# Patient Record
Sex: FEMALE | Race: WHITE | NOT HISPANIC OR LATINO | Employment: FULL TIME | URBAN - METROPOLITAN AREA
[De-identification: names, ages, dates, MRNs, and addresses within clinical notes are randomized per-mention and may not be internally consistent; named-entity substitution may affect disease eponyms.]

---

## 2019-06-10 ENCOUNTER — TRANSCRIBE ORDERS (OUTPATIENT)
Dept: ADMINISTRATIVE | Facility: HOSPITAL | Age: 39
End: 2019-06-10

## 2019-06-10 ENCOUNTER — HOSPITAL ENCOUNTER (OUTPATIENT)
Dept: GENERAL RADIOLOGY | Facility: HOSPITAL | Age: 39
Discharge: HOME | End: 2019-06-10
Admitting: INTERNAL MEDICINE

## 2019-06-10 DIAGNOSIS — M54.2 NECK PAIN: Primary | ICD-10-CM

## 2019-06-10 DIAGNOSIS — M54.2 NECK PAIN: ICD-10-CM

## 2019-06-10 PROCEDURE — 72040 X-RAY EXAM NECK SPINE 2-3 VW: CPT

## 2019-06-28 ENCOUNTER — TRANSCRIBE ORDERS (OUTPATIENT)
Dept: ADMINISTRATIVE | Facility: HOSPITAL | Age: 39
End: 2019-06-28

## 2019-06-28 DIAGNOSIS — R11.0 NAUSEA: ICD-10-CM

## 2019-06-28 DIAGNOSIS — R51.9 HEADACHE, CHRONIC DAILY: Primary | ICD-10-CM

## 2019-07-15 ENCOUNTER — HOSPITAL ENCOUNTER (OUTPATIENT)
Dept: MRI IMAGING | Facility: HOSPITAL | Age: 39
Discharge: HOME | End: 2019-07-15
Admitting: INTERNAL MEDICINE

## 2019-07-15 DIAGNOSIS — R51.9 HEADACHE, CHRONIC DAILY: ICD-10-CM

## 2019-07-15 DIAGNOSIS — R11.0 NAUSEA: ICD-10-CM

## 2019-07-15 PROCEDURE — 70551 MRI BRAIN STEM W/O DYE: CPT

## 2021-02-24 RX ORDER — DULOXETIN HYDROCHLORIDE 30 MG/1
30 CAPSULE, DELAYED RELEASE ORAL DAILY
COMMUNITY
Start: 2020-12-03 | End: 2021-02-24 | Stop reason: SDUPTHER

## 2021-02-25 RX ORDER — DULOXETIN HYDROCHLORIDE 30 MG/1
30 CAPSULE, DELAYED RELEASE ORAL DAILY
Qty: 30 CAPSULE | Refills: 0 | Status: SHIPPED | OUTPATIENT
Start: 2021-02-25 | End: 2021-03-15 | Stop reason: SDUPTHER

## 2021-03-15 ENCOUNTER — OFFICE VISIT (OUTPATIENT)
Dept: INTERNAL MEDICINE | Facility: CLINIC | Age: 41
End: 2021-03-15

## 2021-03-15 VITALS
WEIGHT: 193 LBS | HEIGHT: 66 IN | RESPIRATION RATE: 16 BRPM | TEMPERATURE: 96.9 F | BODY MASS INDEX: 31.02 KG/M2 | DIASTOLIC BLOOD PRESSURE: 86 MMHG | OXYGEN SATURATION: 99 % | SYSTOLIC BLOOD PRESSURE: 126 MMHG | HEART RATE: 72 BPM

## 2021-03-15 DIAGNOSIS — K21.00 GASTROESOPHAGEAL REFLUX DISEASE WITH ESOPHAGITIS WITHOUT HEMORRHAGE: ICD-10-CM

## 2021-03-15 DIAGNOSIS — F41.9 ANXIETY AND DEPRESSION: Primary | ICD-10-CM

## 2021-03-15 DIAGNOSIS — J30.2 SEASONAL ALLERGIES: ICD-10-CM

## 2021-03-15 DIAGNOSIS — G62.9 POLYNEUROPATHY, UNSPECIFIED: ICD-10-CM

## 2021-03-15 DIAGNOSIS — F32.A ANXIETY AND DEPRESSION: Primary | ICD-10-CM

## 2021-03-15 PROCEDURE — 99214 OFFICE O/P EST MOD 30 MIN: CPT | Performed by: INTERNAL MEDICINE

## 2021-03-15 RX ORDER — FLUTICASONE PROPIONATE 50 MCG
2 SPRAY, SUSPENSION (ML) NASAL DAILY
Qty: 18.2 ML | Refills: 5 | Status: SHIPPED | OUTPATIENT
Start: 2021-03-15 | End: 2021-10-27

## 2021-03-15 RX ORDER — BUPROPION HYDROCHLORIDE 150 MG/1
150 TABLET ORAL DAILY
COMMUNITY
Start: 2021-02-24 | End: 2021-03-15 | Stop reason: SDUPTHER

## 2021-03-15 RX ORDER — FLUTICASONE PROPIONATE 50 MCG
SPRAY, SUSPENSION (ML) NASAL
COMMUNITY
End: 2021-03-15 | Stop reason: SDUPTHER

## 2021-03-15 RX ORDER — BUPROPION HYDROCHLORIDE 150 MG/1
150 TABLET ORAL DAILY
Qty: 90 TABLET | Refills: 1 | Status: SHIPPED | OUTPATIENT
Start: 2021-03-15 | End: 2021-08-23 | Stop reason: SDUPTHER

## 2021-03-15 RX ORDER — DULOXETIN HYDROCHLORIDE 30 MG/1
30 CAPSULE, DELAYED RELEASE ORAL DAILY
Qty: 90 CAPSULE | Refills: 1 | Status: SHIPPED | OUTPATIENT
Start: 2021-03-15 | End: 2021-08-23 | Stop reason: SDUPTHER

## 2021-03-15 RX ORDER — LEVOCETIRIZINE DIHYDROCHLORIDE 5 MG/1
5 TABLET, FILM COATED ORAL EVERY EVENING
Qty: 90 TABLET | Refills: 3 | Status: SHIPPED | OUTPATIENT
Start: 2021-03-15 | End: 2022-04-07

## 2021-03-15 RX ORDER — LEVOCETIRIZINE DIHYDROCHLORIDE 5 MG/1
TABLET, FILM COATED ORAL
COMMUNITY
Start: 2020-12-20 | End: 2021-03-15 | Stop reason: SDUPTHER

## 2021-03-15 RX ORDER — GABAPENTIN 300 MG/1
600 CAPSULE ORAL 3 TIMES DAILY
COMMUNITY
Start: 2021-02-24 | End: 2021-04-26

## 2021-03-15 NOTE — ASSESSMENT & PLAN NOTE
CONTROLLED  - cont wellbutrin and cymbalta at current doses--> refills sent today  - Reviewed potential side effects of medication including sexual performance changes, insomnia, fatigue, weight changes. Pt tolerating well without any issues.

## 2021-03-15 NOTE — ASSESSMENT & PLAN NOTE
CONTROLLED  - currently managing without any Rx medications, cont prn use of OTC meds only  - reflux precautions reviewed and cont to encourage dietary trigger avoidance

## 2021-03-15 NOTE — ASSESSMENT & PLAN NOTE
CONTROLLED  - cont gabapentin use, no refills needed today  - Rx for 600 mg TID which she uses on days she works and is on her feet more, on days off she may only take 1-2 times per day but always takes at night due to worsening symptmos first thing in the morning  - Reviewed controlled nature of substance, potential for abuse/addiction, and possible adverse effects of medication. No red flags for abuse at this time.   - Kwadwo checked and appropriate.

## 2021-04-26 DIAGNOSIS — G62.9 POLYNEUROPATHY, UNSPECIFIED: Primary | ICD-10-CM

## 2021-04-26 RX ORDER — OMEPRAZOLE 40 MG/1
CAPSULE, DELAYED RELEASE ORAL
Qty: 180 CAPSULE | Refills: 2 | Status: SHIPPED | OUTPATIENT
Start: 2021-04-26 | End: 2021-06-14 | Stop reason: ALTCHOICE

## 2021-04-26 RX ORDER — GABAPENTIN 300 MG/1
CAPSULE ORAL
Qty: 180 CAPSULE | Refills: 2 | Status: SHIPPED | OUTPATIENT
Start: 2021-04-26 | End: 2021-08-23 | Stop reason: SDUPTHER

## 2021-04-27 DIAGNOSIS — G62.9 POLYNEUROPATHY, UNSPECIFIED: ICD-10-CM

## 2021-04-27 RX ORDER — GABAPENTIN 300 MG/1
600 CAPSULE ORAL 3 TIMES DAILY
Qty: 180 CAPSULE | Refills: 2 | OUTPATIENT
Start: 2021-04-27

## 2021-04-27 RX ORDER — OMEPRAZOLE 40 MG/1
CAPSULE, DELAYED RELEASE ORAL
Qty: 180 CAPSULE | Refills: 2 | OUTPATIENT
Start: 2021-04-27

## 2021-04-27 NOTE — TELEPHONE ENCOUNTER
Caller: Yadi Syed    Relationship: Self    Best call back number: [phone]     Medication needed:   Requested Prescriptions     Pending Prescriptions Disp Refills   • gabapentin (NEURONTIN) 300 MG capsule 180 capsule 2     Sig: Take 2 capsules by mouth 3 (Three) Times a Day.   • omeprazole (priLOSEC) 40 MG capsule 180 capsule 2       When do you need the refill by: ASAP    What additional details did the patient provide when requesting the medication: PATIENT IS COMPLETELY OUT.    Does the patient have less than a 3 day supply:  [x] Yes  [] No    What is the patient's preferred pharmacy:      [facility] [address] [place] [address] PKY - [phone]  - [phone]   [phone]

## 2021-06-13 RX ORDER — AZELASTINE HYDROCHLORIDE 137 UG/1
SPRAY, METERED NASAL
Qty: 90 ML | Refills: 0 | Status: SHIPPED | OUTPATIENT
Start: 2021-06-13

## 2021-06-14 ENCOUNTER — OFFICE VISIT (OUTPATIENT)
Dept: INTERNAL MEDICINE | Facility: CLINIC | Age: 41
End: 2021-06-14

## 2021-06-14 VITALS
RESPIRATION RATE: 16 BRPM | DIASTOLIC BLOOD PRESSURE: 76 MMHG | BODY MASS INDEX: 33.11 KG/M2 | WEIGHT: 206 LBS | OXYGEN SATURATION: 98 % | HEIGHT: 66 IN | TEMPERATURE: 96.6 F | HEART RATE: 69 BPM | SYSTOLIC BLOOD PRESSURE: 126 MMHG

## 2021-06-14 DIAGNOSIS — M54.42 CHRONIC BILATERAL LOW BACK PAIN WITH BILATERAL SCIATICA: ICD-10-CM

## 2021-06-14 DIAGNOSIS — G89.29 CHRONIC BILATERAL LOW BACK PAIN WITH BILATERAL SCIATICA: ICD-10-CM

## 2021-06-14 DIAGNOSIS — M54.41 CHRONIC BILATERAL LOW BACK PAIN WITH BILATERAL SCIATICA: ICD-10-CM

## 2021-06-14 DIAGNOSIS — R10.13 EPIGASTRIC PAIN: Primary | ICD-10-CM

## 2021-06-14 PROCEDURE — 96372 THER/PROPH/DIAG INJ SC/IM: CPT | Performed by: INTERNAL MEDICINE

## 2021-06-14 PROCEDURE — 99214 OFFICE O/P EST MOD 30 MIN: CPT | Performed by: INTERNAL MEDICINE

## 2021-06-14 RX ORDER — LIDOCAINE 50 MG/G
1 PATCH TOPICAL EVERY 24 HOURS
COMMUNITY
Start: 2021-06-10 | End: 2021-10-27

## 2021-06-14 RX ORDER — NAPROXEN 500 MG/1
500 TABLET ORAL
COMMUNITY
Start: 2021-06-10 | End: 2021-06-14 | Stop reason: ALTCHOICE

## 2021-06-14 RX ORDER — DICLOFENAC SODIUM AND MISOPROSTOL 75; 200 MG/1; UG/1
1 TABLET, DELAYED RELEASE ORAL 2 TIMES DAILY
Qty: 60 TABLET | Refills: 2 | Status: SHIPPED | OUTPATIENT
Start: 2021-06-14 | End: 2021-10-27

## 2021-06-14 RX ORDER — METHOCARBAMOL 500 MG/1
500 TABLET, FILM COATED ORAL 4 TIMES DAILY
COMMUNITY
Start: 2021-06-10 | End: 2021-07-22

## 2021-06-14 RX ORDER — METHYLPREDNISOLONE ACETATE 40 MG/ML
60 INJECTION, SUSPENSION INTRA-ARTICULAR; INTRALESIONAL; INTRAMUSCULAR; SOFT TISSUE ONCE
Status: COMPLETED | OUTPATIENT
Start: 2021-06-14 | End: 2021-06-14

## 2021-06-14 RX ORDER — PANTOPRAZOLE SODIUM 40 MG/1
40 TABLET, DELAYED RELEASE ORAL DAILY
Qty: 60 TABLET | Refills: 2 | Status: SHIPPED | OUTPATIENT
Start: 2021-06-14 | End: 2021-10-27

## 2021-06-14 RX ADMIN — METHYLPREDNISOLONE ACETATE 60 MG: 40 INJECTION, SUSPENSION INTRA-ARTICULAR; INTRALESIONAL; INTRAMUSCULAR; SOFT TISSUE at 08:49

## 2021-06-14 NOTE — PROGRESS NOTES
"Chief Complaint  Nausea and stomach ulcers    Subjective          Yadi Syed presents to [facility] for chronic nausea and concern for stomach ulcers. She has been dealing with this for months to years now, is intermittent, but feels a constant sensation of early satiety. She was first told she had stomach ulcers since she was 21 yo and now has some sharp pains in her epigastric region.   She has been to [facility] for a rash on her back and was given a systemic steroid, then threw her back out and went to ER and was given NSAIDS and muscle relaxers, has noticed worsening in her stomach pains since starting NSAIDS and steroids.   She is currently on prilosec which she takes BID.     Objective   Vital Signs:     /76   Pulse 69   Temp 96.6 °F (35.9 °C)   Resp 16   Ht 167.6 cm (66\")   Wt 93.4 kg (206 lb)   SpO2 98%   BMI 33.25 kg/m²     Physical Exam  Vitals and nursing note reviewed.   Constitutional:       General: She is not in acute distress.     Appearance: Normal appearance.   Cardiovascular:      Rate and Rhythm: Normal rate and regular rhythm.      Pulses: Normal pulses.      Heart sounds: Normal heart sounds. No murmur heard.     Pulmonary:      Effort: Pulmonary effort is normal. No respiratory distress.      Breath sounds: Normal breath sounds.   Abdominal:      General: Abdomen is flat. Bowel sounds are normal.      Palpations: Abdomen is soft.      Tenderness: There is no abdominal tenderness.   Musculoskeletal:      Right lower leg: No edema.      Left lower leg: No edema.   Neurological:      Mental Status: She is alert and oriented to person, place, and time. Mental status is at baseline.   Psychiatric:         Mood and Affect: Mood normal.         Behavior: Behavior normal.          Result Review :   The following data was reviewed by: Génesis Azul MD on 06/14/2021:    Data reviewed: Radiologic studies CT abd/pelvis and Recent " hospitalization notes ER visit 6/10     Assessment and Plan      Diagnoses and all orders for this visit:    1. Epigastric pain (Primary)   - will change PPI from omeprazole to pantoprazole 40 mg BID for improved control of pain   - will refer on to GI as pt likely deserves EGD to look for PUD vs gastritis given history   - reflux precautions reviewed and cont to encourage dietary trigger avoidance   - pt should try to limit NSAID usage as much as possible right now as this is def contributing to her ongoing pain whether GERD or PUD     Orders:  -     pantoprazole (Protonix) 40 MG EC tablet; Take 1 tablet by mouth Daily.  Dispense: 60 tablet; Refill: 2  -     Ambulatory Referral to Gastroenterology    2. Chronic bilateral low back pain with bilateral sciatica  Assessment & Plan:  UNCONTROLLED   - will give steroid injection in office today, reviewed that this is NOT proper first line treatment and will not provide long term benefit, but want to get her off of oral steroids and NSAIDS related to possible PUD above  - try to manage on muscle relaxers alone, however, while pendign GI eval, if pain becomes intolerable, can try new NSAID Rx sent with misoprostol to see if this is better tolerated with less associated GI upset  - if EGD/GI eval shows PUD is present, will officially tell pt she cannot take NSAIDS going forward and will pursue imaging and epidural injections for her back pain    Orders:  -     diclofenac-miSOPROStol (ARTHROTEC 75) 75-0.2 MG EC tablet; Take 1 tablet by mouth 2 (Two) Times a Day.  Dispense: 60 tablet; Refill: 2  -     methylPREDNISolone acetate (DEPO-medrol) injection 60 mg    Follow Up   Return if symptoms worsen or fail to improve.    Patient was given instructions and counseling regarding her condition or for health maintenance advice. Please see specific information pulled into the AVS if appropriate.     Lore Azul MD  [facility]  Phone: [phone]  Fax: [phone]

## 2021-06-14 NOTE — ASSESSMENT & PLAN NOTE
UNCONTROLLED   - will give steroid injection in office today, reviewed that this is NOT proper first line treatment and will not provide long term benefit, but want to get her off of oral steroids and NSAIDS related to possible PUD above  - try to manage on muscle relaxers alone, however, while pendign GI eval, if pain becomes intolerable, can try new NSAID Rx sent with misoprostol to see if this is better tolerated with less associated GI upset  - if EGD/GI eval shows PUD is present, will officially tell pt she cannot take NSAIDS going forward and will pursue imaging and epidural injections for her back pain

## 2021-07-22 ENCOUNTER — OFFICE VISIT (OUTPATIENT)
Dept: INTERNAL MEDICINE | Facility: CLINIC | Age: 41
End: 2021-07-22

## 2021-07-22 VITALS
RESPIRATION RATE: 18 BRPM | OXYGEN SATURATION: 98 % | TEMPERATURE: 98.4 F | WEIGHT: 204 LBS | BODY MASS INDEX: 32.78 KG/M2 | SYSTOLIC BLOOD PRESSURE: 130 MMHG | HEART RATE: 82 BPM | DIASTOLIC BLOOD PRESSURE: 74 MMHG | HEIGHT: 66 IN

## 2021-07-22 DIAGNOSIS — N39.0 URINARY TRACT INFECTION WITH HEMATURIA, SITE UNSPECIFIED: ICD-10-CM

## 2021-07-22 DIAGNOSIS — G89.29 CHRONIC BILATERAL LOW BACK PAIN WITH BILATERAL SCIATICA: ICD-10-CM

## 2021-07-22 DIAGNOSIS — R31.9 URINARY TRACT INFECTION WITH HEMATURIA, SITE UNSPECIFIED: ICD-10-CM

## 2021-07-22 DIAGNOSIS — M54.42 CHRONIC BILATERAL LOW BACK PAIN WITH BILATERAL SCIATICA: ICD-10-CM

## 2021-07-22 DIAGNOSIS — M54.41 CHRONIC BILATERAL LOW BACK PAIN WITH BILATERAL SCIATICA: ICD-10-CM

## 2021-07-22 DIAGNOSIS — R35.0 URINARY FREQUENCY: Primary | ICD-10-CM

## 2021-07-22 LAB
BILIRUB BLD-MCNC: NEGATIVE MG/DL
CLARITY, POC: CLEAR
COLOR UR: YELLOW
GLUCOSE UR STRIP-MCNC: NEGATIVE MG/DL
KETONES UR QL: NEGATIVE
LEUKOCYTE EST, POC: ABNORMAL
NITRITE UR-MCNC: NEGATIVE MG/ML
PH UR: 7 [PH] (ref 5–8)
PROT UR STRIP-MCNC: NEGATIVE MG/DL
RBC # UR STRIP: ABNORMAL /UL
SP GR UR: 1.02 (ref 1–1.03)
UROBILINOGEN UR QL: NORMAL

## 2021-07-22 PROCEDURE — 99214 OFFICE O/P EST MOD 30 MIN: CPT | Performed by: INTERNAL MEDICINE

## 2021-07-22 RX ORDER — METAXALONE 800 MG/1
800 TABLET ORAL 3 TIMES DAILY PRN
Qty: 90 TABLET | Refills: 2 | Status: SHIPPED | OUTPATIENT
Start: 2021-07-22 | End: 2021-08-23

## 2021-07-22 RX ORDER — SULFAMETHOXAZOLE AND TRIMETHOPRIM 800; 160 MG/1; MG/1
1 TABLET ORAL 2 TIMES DAILY
Qty: 6 TABLET | Refills: 0 | Status: SHIPPED | OUTPATIENT
Start: 2021-07-22 | End: 2021-07-25

## 2021-07-22 NOTE — PROGRESS NOTES
"Chief Complaint  Pain (lumbar), Flank Pain (right ), and Urinary Frequency    Subjective          Yadi Syed presents to [facility]  Few months of low back pain, has had \"multiple rounds of steroids, steroid shots, CAT scans\" over last couple of months, taking ibuprofen daily for pain; has had some right sided sciatic pain for years; does take gabapentin 600mg TID for history of bilateral hip pain; did try diclofenac/misoprostol combo    Dyspepsia, doing well with pantoprazole 40mg, has EGD planned    Increased urination, no dysuria; no urinary accidents; states had history of \"kidney reflux\", follows with urology      Objective   Vital Signs:   /74 (BP Location: Left arm, Patient Position: Sitting, Cuff Size: Adult)   Pulse 82   Temp 98.4 °F (36.9 °C)   Resp 18   Ht 167.6 cm (66\")   Wt 92.5 kg (204 lb)   SpO2 98%   BMI 32.93 kg/m²     Physical Exam  Vitals and nursing note reviewed.   Constitutional:       Appearance: Normal appearance.   HENT:      Head: Normocephalic and atraumatic.      Right Ear: External ear normal.      Left Ear: External ear normal.      Nose: Nose normal.      Mouth/Throat:      Mouth: Mucous membranes are moist.      Pharynx: Oropharynx is clear.   Eyes:      Extraocular Movements: Extraocular movements intact.      Conjunctiva/sclera: Conjunctivae normal.   Pulmonary:      Effort: Pulmonary effort is normal. No respiratory distress.   Musculoskeletal:         General: Normal range of motion.      Cervical back: Normal range of motion.      Comments: No midline pain, mild paraspinal ttp, 5/5 strength flex/extend   Skin:     Findings: No rash.   Neurological:      General: No focal deficit present.      Mental Status: She is alert. Mental status is at baseline.   Psychiatric:         Mood and Affect: Mood normal.         Behavior: Behavior normal.         Thought Content: Thought content normal.         Judgment: Judgment " normal.        Result Review :                 Assessment and Plan    Diagnoses and all orders for this visit:    1. Urinary frequency (Primary)  -     POCT urinalysis dipstick, automated  -     sulfamethoxazole-trimethoprim (Bactrim DS) 800-160 MG per tablet; Take 1 tablet by mouth 2 (Two) Times a Day for 3 days.  Dispense: 6 tablet; Refill: 0    - discussed risks and benefits of abx, would benefit from bactrim given symptoms; increase fluid intake    2. Chronic bilateral low back pain with bilateral sciatica  -     Ambulatory Referral to Physical Therapy Evaluate and treat  -     MRI Lumbar Spine Without Contrast; Future  -     metaxalone (Skelaxin) 800 MG tablet; Take 1 tablet by mouth 3 (Three) Times a Day As Needed for Muscle Spasms.  Dispense: 90 tablet; Refill: 2  -     POCT urinalysis dipstick, automated  - metaxalone for CNS relaxation, MRI to further evaluate next steps, has not prgoressed as she would have hoped with supportive care, PT    3. Urinary tract infection with hematuria, site unspecified  -     Urine Culture - Urine, Urine, Clean Catch; Future  -     Urine Culture - Urine, Urine, Clean Catch        Follow Up   Return in about 6 weeks (around 9/2/2021) for Recheck.  Patient was given instructions and counseling regarding her condition or for health maintenance advice. Please see specific information pulled into the AVS if appropriate.

## 2021-07-24 LAB
BACTERIA UR CULT: NORMAL
BACTERIA UR CULT: NORMAL

## 2021-07-28 ENCOUNTER — HOSPITAL ENCOUNTER (OUTPATIENT)
Dept: CT IMAGING | Facility: HOSPITAL | Age: 41
Discharge: HOME | End: 2021-07-28
Admitting: INTERNAL MEDICINE

## 2021-07-28 ENCOUNTER — OFFICE VISIT (OUTPATIENT)
Dept: INTERNAL MEDICINE | Facility: CLINIC | Age: 41
End: 2021-07-28

## 2021-07-28 VITALS
DIASTOLIC BLOOD PRESSURE: 72 MMHG | OXYGEN SATURATION: 99 % | HEART RATE: 78 BPM | RESPIRATION RATE: 16 BRPM | HEIGHT: 66 IN | WEIGHT: 206.2 LBS | TEMPERATURE: 97.5 F | SYSTOLIC BLOOD PRESSURE: 120 MMHG | BODY MASS INDEX: 33.14 KG/M2

## 2021-07-28 DIAGNOSIS — N39.0 URINARY TRACT INFECTION WITH HEMATURIA, SITE UNSPECIFIED: ICD-10-CM

## 2021-07-28 DIAGNOSIS — R10.9 RIGHT FLANK PAIN: ICD-10-CM

## 2021-07-28 DIAGNOSIS — R35.0 URINARY FREQUENCY: Primary | ICD-10-CM

## 2021-07-28 DIAGNOSIS — R31.9 URINARY TRACT INFECTION WITH HEMATURIA, SITE UNSPECIFIED: ICD-10-CM

## 2021-07-28 LAB
CLARITY, POC: ABNORMAL
COLOR UR: ABNORMAL
GLUCOSE UR STRIP-MCNC: NEGATIVE MG/DL
KETONES UR QL: NEGATIVE
LEUKOCYTE EST, POC: ABNORMAL
NITRITE UR-MCNC: NEGATIVE MG/ML
PH UR: 6.5 [PH] (ref 5–8)
PROT UR STRIP-MCNC: ABNORMAL MG/DL
RBC # UR STRIP: ABNORMAL /UL
SP GR UR: 1.02 (ref 1–1.03)

## 2021-07-28 PROCEDURE — 74176 CT ABD & PELVIS W/O CONTRAST: CPT

## 2021-07-28 PROCEDURE — 99214 OFFICE O/P EST MOD 30 MIN: CPT | Performed by: INTERNAL MEDICINE

## 2021-07-28 PROCEDURE — 81003 URINALYSIS AUTO W/O SCOPE: CPT | Performed by: INTERNAL MEDICINE

## 2021-07-28 RX ORDER — METHOCARBAMOL 750 MG/1
1500 TABLET, FILM COATED ORAL 3 TIMES DAILY
Qty: 90 TABLET | Refills: 2 | Status: SHIPPED | OUTPATIENT
Start: 2021-07-28 | End: 2021-08-23

## 2021-07-28 NOTE — PROGRESS NOTES
"Chief Complaint  Back Pain (BACK PAIN AND SIDE PAIN X 1 WEEK/NOT SURE HOW SHE HURT HER BACK) and Urinary Tract Infection (\"BONE IN URINE/HISTORY OF BLOOD IN URINE/WANTS A REFERRAL TO UROLOGIST)    Subjective          Yadi Syed presents to [facility]  Here with continued flank pain, right pain, feels worse than the other day she states; no fevers; did have an episode of urination where she \"peed out bones\"; no vomiting, no discharge      Objective   Vital Signs:   /72 (BP Location: Left arm, Patient Position: Sitting, Cuff Size: Adult)   Pulse 78   Temp 97.5 °F (36.4 °C) (Temporal)   Resp 16   Ht 167.6 cm (66\")   Wt 93.5 kg (206 lb 3.2 oz)   SpO2 99%   BMI 33.28 kg/m²     Physical Exam  Vitals and nursing note reviewed.   Constitutional:       Appearance: Normal appearance.   HENT:      Head: Normocephalic and atraumatic.      Right Ear: Tympanic membrane, ear canal and external ear normal.      Left Ear: Tympanic membrane, ear canal and external ear normal.      Nose: Nose normal.      Mouth/Throat:      Mouth: Mucous membranes are moist.      Pharynx: Oropharynx is clear.   Eyes:      Extraocular Movements: Extraocular movements intact.      Conjunctiva/sclera: Conjunctivae normal.      Pupils: Pupils are equal, round, and reactive to light.   Cardiovascular:      Rate and Rhythm: Normal rate and regular rhythm.      Pulses: Normal pulses.      Heart sounds: Normal heart sounds.   Pulmonary:      Effort: Pulmonary effort is normal.      Breath sounds: Normal breath sounds. No wheezing, rhonchi or rales.   Abdominal:      General: Abdomen is flat. There is no distension.      Palpations: Abdomen is soft.      Tenderness: There is no abdominal tenderness. There is right CVA tenderness and left CVA tenderness.   Musculoskeletal:         General: Normal range of motion.      Cervical back: Normal range of motion and neck supple.      Right lower leg: No " edema.      Left lower leg: No edema.   Skin:     General: Skin is warm and dry.      Capillary Refill: Capillary refill takes less than 2 seconds.      Findings: No rash.   Neurological:      General: No focal deficit present.      Mental Status: She is alert and oriented to person, place, and time. Mental status is at baseline.   Psychiatric:         Mood and Affect: Mood normal.         Behavior: Behavior normal.        Result Review :     Urine Culture    Urine Culture 7/22/21   Urine Culture Final report           Data reviewed: labs          Assessment and Plan    Diagnoses and all orders for this visit:    1. Urinary frequency (Primary)  -     POCT urinalysis dipstick, automated  -     Urine Culture - Urine, Urine, Clean Catch    2. Urinary tract infection with hematuria, site unspecified  -     POCT urinalysis dipstick, automated    3. Right flank pain  -     Comprehensive metabolic panel  -     CBC w AUTO Differential  -     CT Abdomen Pelvis Without Contrast    - noted right flank pain today in setting of dysuria, possible urination of stone material; will get stat CT abd/pelvis to evaluate further  - send urine for culture today, noted previous labs, urinalysis  - will send antibiotics and pain meds pending above    Follow Up   No follow-ups on file.  Patient was given instructions and counseling regarding her condition or for health maintenance advice. Please see specific information pulled into the AVS if appropriate.

## 2021-07-29 LAB
ALBUMIN SERPL-MCNC: 3.9 G/DL (ref 3.5–5.2)
ALBUMIN/GLOB SERPL: 1.8 {RATIO}
ALP SERPL-CCNC: 66 U/L (ref 39–117)
ALT SERPL-CCNC: 13 U/L (ref 1–33)
AST SERPL-CCNC: 16 U/L (ref 1–32)
BASOPHILS # BLD AUTO: 0.05 10E3/MM3 (ref 0–0.2)
BASOPHILS NFR BLD AUTO: 0.5 % (ref 0–1.5)
BILIRUB SERPL-MCNC: 0.3 MG/DL (ref 0.2–1.2)
BUN SERPL-MCNC: 10 MG/DL (ref 6–20)
BUN/CREAT SERPL: 13.3 (ref 7–25)
CALCIUM SERPL-MCNC: 9.2 MG/DL (ref 8.6–10.5)
CHLORIDE SERPL-SCNC: 105 MMOL/L (ref 98–107)
CO2 SERPL-SCNC: 26.4 MMOL/L (ref 22–29)
CREAT SERPL-MCNC: 0.75 MG/DL (ref 0.57–1)
EOSINOPHIL # BLD AUTO: 0.47 10E3/MM3 (ref 0–0.4)
EOSINOPHIL NFR BLD AUTO: 4.9 % (ref 0.3–6.2)
ERYTHROCYTE [DISTWIDTH] IN BLOOD BY AUTOMATED COUNT: 14.5 % (ref 12.3–15.4)
GLOBULIN SER CALC-MCNC: 2.2 GM/DL
GLUCOSE SERPL-MCNC: 72 MG/DL (ref 65–99)
HCT VFR BLD AUTO: 40.2 % (ref 34–46.6)
HGB BLD-MCNC: 13.3 G/DL (ref 12–15.9)
IMM GRANULOCYTES # BLD AUTO: 0.03 10E3/MM3 (ref 0–0.05)
IMM GRANULOCYTES NFR BLD AUTO: 0.3 % (ref 0–0.5)
LYMPHOCYTES # BLD AUTO: 2.3 10E3/MM3 (ref 0.7–3.1)
LYMPHOCYTES NFR BLD AUTO: 23.9 % (ref 19.6–45.3)
MCH RBC QN AUTO: 29.2 PG (ref 26.6–33)
MCHC RBC AUTO-ENTMCNC: 33.1 G/DL (ref 31.5–35.7)
MCV RBC AUTO: 88.4 FL (ref 79–97)
MONOCYTES # BLD AUTO: 1.01 10E3/MM3 (ref 0.1–0.9)
MONOCYTES NFR BLD AUTO: 10.5 % (ref 5–12)
NEUTROPHILS # BLD AUTO: 5.76 10E3/MM3 (ref 1.7–7)
NEUTROPHILS NFR BLD AUTO: 59.9 % (ref 42.7–76)
NRBC BLD AUTO-RTO: 0 /100 WBC (ref 0–0.2)
PLATELET # BLD AUTO: 356 10E3/MM3 (ref 140–450)
POTASSIUM SERPL-SCNC: 4.7 MMOL/L (ref 3.5–5.2)
PROT SERPL-MCNC: 6.1 G/DL (ref 6–8.5)
RBC # BLD AUTO: 4.55 10E6/MM3 (ref 3.77–5.28)
SODIUM SERPL-SCNC: 139 MMOL/L (ref 136–145)
WBC # BLD AUTO: 9.62 10E3/MM3 (ref 3.4–10.8)

## 2021-07-30 LAB
BACTERIA UR CULT: NORMAL
BACTERIA UR CULT: NORMAL

## 2021-08-09 ENCOUNTER — HOSPITAL ENCOUNTER (OUTPATIENT)
Dept: MRI IMAGING | Facility: HOSPITAL | Age: 41
End: 2021-08-09

## 2021-08-11 ENCOUNTER — PREP FOR SURGERY (OUTPATIENT)
Dept: SURGERY | Facility: SURGERY CENTER | Age: 41
End: 2021-08-11

## 2021-08-11 ENCOUNTER — OFFICE VISIT (OUTPATIENT)
Dept: GASTROENTEROLOGY | Facility: CLINIC | Age: 41
End: 2021-08-11

## 2021-08-11 VITALS
TEMPERATURE: 96.6 F | HEART RATE: 92 BPM | DIASTOLIC BLOOD PRESSURE: 80 MMHG | BODY MASS INDEX: 31.99 KG/M2 | SYSTOLIC BLOOD PRESSURE: 132 MMHG | OXYGEN SATURATION: 99 % | WEIGHT: 203.8 LBS | HEIGHT: 67 IN

## 2021-08-11 DIAGNOSIS — A04.8 H. PYLORI INFECTION: ICD-10-CM

## 2021-08-11 DIAGNOSIS — K27.9 PUD (PEPTIC ULCER DISEASE): Primary | ICD-10-CM

## 2021-08-11 DIAGNOSIS — R68.81 EARLY SATIETY: ICD-10-CM

## 2021-08-11 DIAGNOSIS — K29.70 HELICOBACTER PYLORI GASTRITIS: ICD-10-CM

## 2021-08-11 DIAGNOSIS — B96.81 HELICOBACTER PYLORI GASTRITIS: ICD-10-CM

## 2021-08-11 DIAGNOSIS — K21.9 GASTROESOPHAGEAL REFLUX DISEASE, UNSPECIFIED WHETHER ESOPHAGITIS PRESENT: ICD-10-CM

## 2021-08-11 DIAGNOSIS — K21.00 GASTROESOPHAGEAL REFLUX DISEASE WITH ESOPHAGITIS WITHOUT HEMORRHAGE: ICD-10-CM

## 2021-08-11 PROCEDURE — 99244 OFF/OP CNSLTJ NEW/EST MOD 40: CPT | Performed by: INTERNAL MEDICINE

## 2021-08-11 RX ORDER — SODIUM CHLORIDE 0.9 % (FLUSH) 0.9 %
10 SYRINGE (ML) INJECTION AS NEEDED
Status: CANCELLED | OUTPATIENT
Start: 2021-08-11

## 2021-08-11 RX ORDER — SODIUM CHLORIDE, SODIUM LACTATE, POTASSIUM CHLORIDE, CALCIUM CHLORIDE 600; 310; 30; 20 MG/100ML; MG/100ML; MG/100ML; MG/100ML
30 INJECTION, SOLUTION INTRAVENOUS CONTINUOUS PRN
Status: CANCELLED | OUTPATIENT
Start: 2021-08-11

## 2021-08-11 RX ORDER — SODIUM CHLORIDE 0.9 % (FLUSH) 0.9 %
3 SYRINGE (ML) INJECTION EVERY 12 HOURS SCHEDULED
Status: CANCELLED | OUTPATIENT
Start: 2021-08-11

## 2021-08-11 NOTE — PROGRESS NOTES
"Chief Complaint   Patient presents with   • Abdominal Pain           History of Present Illness  41-year-old female presents today for abdominal pain.  She has had previous peptic ulcer diagnosis and H pylori via breath test. No previous EGD.    She has epigastric pain and acid burning.  Also early satiety.  No weight loss.    She takes NSAIDS regularly. She was started on arthrotec recently.     She takes omeprazole daily as well. She noticed some acid reflux improved with gallbladder removal times.     She can have constipation at times.  No blood or dark stools.  She has noticed more regular bowel movements recently.    She is status post cholecystectomy.       Result Review :       Comprehensive metabolic panel (07/28/2021 14:45)nl  CBC w AUTO Differential (07/28/2021 14:45)nl for us    methocarbamol (ROBAXIN) 750 MG tablet (07/28/2021)  metaxalone (Skelaxin) 800 MG tablet (07/22/2021)  pantoprazole (Protonix) 40 MG EC tablet (06/14/2021)  diclofenac-miSOPROStol (ARTHROTEC 75) 75-0.2 MG EC tablet (06/14/2021)  gabapentin (NEURONTIN) 300 MG capsule (04/26/2021)  fluticasone (Flonase) 50 MCG/ACT nasal spray (03/15/2021)  CT Abdomen Pelvis Without Contrast (07/28/2021 16:11)  normal  Vital Signs:   /80   Pulse 92   Temp 96.6 °F (35.9 °C)   Ht 170.2 cm (67\")   Wt 92.4 kg (203 lb 12.8 oz)   SpO2 99%   BMI 31.92 kg/m²     Body mass index is 31.92 kg/m².     Physical Exam  Vitals reviewed.   Constitutional:       Appearance: Normal appearance.   HENT:      Nose: No nasal deformity.   Eyes:      General: No scleral icterus.  Neck:      Comments: Trachea midline.  Cardiovascular:      Rate and Rhythm: Normal rate and regular rhythm.   Pulmonary:      Effort: No respiratory distress.      Breath sounds: Normal breath sounds.   Abdominal:      General: Bowel sounds are normal. There is no distension.      Palpations: Abdomen is soft. There is no mass.      Tenderness: There is no abdominal tenderness. "   Lymphadenopathy:      Comments: No periumbilical lymphadenopathy.   Skin:     General: Skin is warm.   Neurological:      Mental Status: She is alert.           Assessment and Plan    Diagnoses and all orders for this visit:    1. PUD (peptic ulcer disease) (Primary)    2. Gastroesophageal reflux disease, unspecified whether esophagitis present  -     NM Gastric Emptying; Future    3. Early satiety  -     NM Gastric Emptying; Future    4. Helicobacter pylori gastritis    Proceed with EGD, gastric emptying study and start lilia to help with symptoms in addition to daily acid medication.    Additional recommendations will be made based on results of the above work-up and clinical course.    Recommend lowest dose of NSAIDs possible as these can be contributing to symptoms as well.    I have reviewed and confirmed the accuracy of the HPI and Assessment and Plan as documented by the ASHLEY Saavedra        Follow Up   No follow-ups on file.    Patient Instructions   For nausea, bloating and fullness, lilia has been helpful for some patients.    Gingerroot capsules can be purchased over-the-counter.    Directions: Lilia root 500 mg (or 550 mg) capsules, take 1 to 2 capsules 3 times daily before meals, max 6 capsules per day.    Schedule EGD for further evaluation, orders placed.    Additional recommendations will be made based on EGD findings.    Recommend lowest dose of NSAIDs possible to control symptoms as well as these can be contributing to symptoms.    Continue daily acid medication and Further recommendations will be made pending the results of the above work up and clinical course.

## 2021-08-11 NOTE — PATIENT INSTRUCTIONS
For nausea, bloating and fullness, lilia has been helpful for some patients.    Gingerroot capsules can be purchased over-the-counter.    Directions: Lilia root 500 mg (or 550 mg) capsules, take 1 to 2 capsules 3 times daily before meals, max 6 capsules per day.    Schedule EGD for further evaluation, orders placed.    Additional recommendations will be made based on EGD findings.    Recommend lowest dose of NSAIDs possible to control symptoms as well as these can be contributing to symptoms.    Continue daily acid medication and Further recommendations will be made pending the results of the above work up and clinical course.

## 2021-08-12 ENCOUNTER — TRANSCRIBE ORDERS (OUTPATIENT)
Dept: LAB | Facility: SURGERY CENTER | Age: 41
End: 2021-08-12

## 2021-08-12 DIAGNOSIS — Z01.818 OTHER SPECIFIED PRE-OPERATIVE EXAMINATION: Primary | ICD-10-CM

## 2021-08-12 PROBLEM — R68.81 EARLY SATIETY: Status: ACTIVE | Noted: 2021-08-12

## 2021-08-12 PROBLEM — A04.8 H. PYLORI INFECTION: Status: ACTIVE | Noted: 2021-08-12

## 2021-08-16 ENCOUNTER — HOSPITAL ENCOUNTER (OUTPATIENT)
Dept: MRI IMAGING | Facility: HOSPITAL | Age: 41
Discharge: HOME | End: 2021-08-16
Admitting: INTERNAL MEDICINE

## 2021-08-16 DIAGNOSIS — M54.42 CHRONIC BILATERAL LOW BACK PAIN WITH BILATERAL SCIATICA: ICD-10-CM

## 2021-08-16 DIAGNOSIS — G89.29 CHRONIC BILATERAL LOW BACK PAIN WITH BILATERAL SCIATICA: ICD-10-CM

## 2021-08-16 DIAGNOSIS — M54.41 CHRONIC BILATERAL LOW BACK PAIN WITH BILATERAL SCIATICA: ICD-10-CM

## 2021-08-16 PROCEDURE — 72148 MRI LUMBAR SPINE W/O DYE: CPT

## 2021-08-23 ENCOUNTER — OFFICE VISIT (OUTPATIENT)
Dept: INTERNAL MEDICINE | Facility: CLINIC | Age: 41
End: 2021-08-23

## 2021-08-23 VITALS
RESPIRATION RATE: 18 BRPM | DIASTOLIC BLOOD PRESSURE: 82 MMHG | SYSTOLIC BLOOD PRESSURE: 132 MMHG | HEART RATE: 84 BPM | HEIGHT: 67 IN | TEMPERATURE: 98 F | WEIGHT: 200.9 LBS | OXYGEN SATURATION: 97 % | BODY MASS INDEX: 31.53 KG/M2

## 2021-08-23 DIAGNOSIS — F41.9 ANXIETY AND DEPRESSION: ICD-10-CM

## 2021-08-23 DIAGNOSIS — F32.A ANXIETY AND DEPRESSION: ICD-10-CM

## 2021-08-23 DIAGNOSIS — G62.9 POLYNEUROPATHY, UNSPECIFIED: ICD-10-CM

## 2021-08-23 DIAGNOSIS — M51.26 LUMBAR DISC HERNIATION: Primary | ICD-10-CM

## 2021-08-23 DIAGNOSIS — R35.0 URINARY FREQUENCY: ICD-10-CM

## 2021-08-23 LAB
BILIRUB BLD-MCNC: NEGATIVE MG/DL
CLARITY, POC: CLEAR
COLOR UR: YELLOW
GLUCOSE UR STRIP-MCNC: NEGATIVE MG/DL
KETONES UR QL: NEGATIVE
LEUKOCYTE EST, POC: NEGATIVE
NITRITE UR-MCNC: NEGATIVE MG/ML
PH UR: 7 [PH] (ref 5–8)
PROT UR STRIP-MCNC: NEGATIVE MG/DL
RBC # UR STRIP: NEGATIVE /UL
SP GR UR: 1.02 (ref 1–1.03)
UROBILINOGEN UR QL: NORMAL

## 2021-08-23 PROCEDURE — 99214 OFFICE O/P EST MOD 30 MIN: CPT | Performed by: INTERNAL MEDICINE

## 2021-08-23 RX ORDER — BUPROPION HYDROCHLORIDE 150 MG/1
150 TABLET ORAL DAILY
Qty: 90 TABLET | Refills: 1 | Status: SHIPPED | OUTPATIENT
Start: 2021-08-23 | End: 2022-04-05 | Stop reason: SDUPTHER

## 2021-08-23 RX ORDER — DULOXETIN HYDROCHLORIDE 30 MG/1
30 CAPSULE, DELAYED RELEASE ORAL DAILY
Qty: 90 CAPSULE | Refills: 1 | Status: SHIPPED | OUTPATIENT
Start: 2021-08-23 | End: 2022-04-05 | Stop reason: SDUPTHER

## 2021-08-23 RX ORDER — DICLOFENAC SODIUM AND MISOPROSTOL 50; 200 MG/1; UG/1
1 TABLET, DELAYED RELEASE ORAL 3 TIMES DAILY PRN
Qty: 90 TABLET | Refills: 1 | Status: SHIPPED | OUTPATIENT
Start: 2021-08-23 | End: 2022-04-07

## 2021-08-23 NOTE — PROGRESS NOTES
"Chief Complaint  Urinary Frequency and Pain (Lower back and hip bilateral Rt>Lt/ follow up MRI )    Subjective          Yadi Syed presents to [facility]  Pain in mid low back, radiates around right side into right leg, also into left leg at times; has had some relatively persistent numbness in her toes for years she states; no weakness    Urinary frequency, still present, last culture without bacterial growth; no dysuria    BETTY, MDD, doing well with meds, no side effects; mood is good; no thoughts of hurting self, others, suicide      Objective   Vital Signs:   /82   Pulse 84   Temp 98 °F (36.7 °C)   Resp 18   Ht 170.2 cm (67\")   Wt 91.1 kg (200 lb 14.4 oz)   SpO2 97%   BMI 31.47 kg/m²     Physical Exam  Vitals and nursing note reviewed.   Constitutional:       Appearance: Normal appearance.   HENT:      Head: Normocephalic and atraumatic.      Right Ear: Tympanic membrane, ear canal and external ear normal.      Left Ear: Tympanic membrane, ear canal and external ear normal.      Nose: Nose normal.      Mouth/Throat:      Mouth: Mucous membranes are moist.      Pharynx: Oropharynx is clear.   Eyes:      Extraocular Movements: Extraocular movements intact.      Conjunctiva/sclera: Conjunctivae normal.      Pupils: Pupils are equal, round, and reactive to light.   Cardiovascular:      Rate and Rhythm: Normal rate and regular rhythm.      Pulses: Normal pulses.      Heart sounds: Normal heart sounds.   Pulmonary:      Effort: Pulmonary effort is normal.      Breath sounds: Normal breath sounds. No wheezing, rhonchi or rales.   Abdominal:      General: Abdomen is flat. There is no distension.      Palpations: Abdomen is soft.      Tenderness: There is no abdominal tenderness.   Musculoskeletal:         General: Normal range of motion.      Cervical back: Normal range of motion and neck supple.      Right lower leg: No edema.      Left lower leg: No " edema.   Skin:     General: Skin is warm and dry.      Capillary Refill: Capillary refill takes less than 2 seconds.      Findings: No rash.   Neurological:      General: No focal deficit present.      Mental Status: She is alert and oriented to person, place, and time. Mental status is at baseline.   Psychiatric:         Mood and Affect: Mood normal.         Behavior: Behavior normal.        Result Review :                 Assessment and Plan    Diagnoses and all orders for this visit:    1. Lumbar disc herniation (Primary)  -     Ambulatory Referral to Neurosurgery  -     Epidural Block  -     diclofenac-miSOPROStol (Arthrotec) 50-0.2 MG EC tablet; Take 1 tablet by mouth 3 (Three) Times a Day As Needed (pain).  Dispense: 90 tablet; Refill: 1    2. Urinary frequency  -     POCT urinalysis dipstick, automated  -     Chlamydia trachomatis, Neisseria gonorrhoeae, Trichomonas vaginalis, PCR - Urine, Urine, Clean Catch    3. Anxiety and depression  -     buPROPion XL (WELLBUTRIN XL) 150 MG 24 hr tablet; Take 1 tablet by mouth Daily.  Dispense: 90 tablet; Refill: 1  -     DULoxetine (CYMBALTA) 30 MG capsule; Take 1 capsule by mouth Daily.  Dispense: 90 capsule; Refill: 1    4. Polyneuropathy, unspecified  -     gabapentin (NEURONTIN) 300 MG capsule; Take 2 capsules by mouth 3 (Three) Times a Day.  Dispense: 180 capsule; Refill: 0    - referral to epidural inijection placed today, placed referral for NSGY as wel; counseled on risks and benefits, consider PT, discussed  - conitnue duloxetine and wellbutrin, doing well; mood is much better, less anxious, well controlled  - conitnue gabapentin, doing well witht his without side effects  - rt  follow up 3 months or earlier pending above    Follow Up {Instructions Charge Capture  Follow-up Communications :23}  No follow-ups on file.  Patient was given instructions and counseling regarding her condition or for health maintenance advice. Please see specific information  pulled into the AVS if appropriate.

## 2021-08-24 RX ORDER — GABAPENTIN 300 MG/1
600 CAPSULE ORAL 3 TIMES DAILY
Qty: 180 CAPSULE | Refills: 0 | Status: SHIPPED | OUTPATIENT
Start: 2021-08-24 | End: 2021-10-07
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
## 2021-08-25 ENCOUNTER — TREATMENT (OUTPATIENT)
Dept: PHYSICAL THERAPY | Facility: CLINIC | Age: 41
End: 2021-08-25

## 2021-08-25 DIAGNOSIS — M54.42 CHRONIC BILATERAL LOW BACK PAIN WITH BILATERAL SCIATICA: Primary | ICD-10-CM

## 2021-08-25 DIAGNOSIS — M54.41 CHRONIC BILATERAL LOW BACK PAIN WITH BILATERAL SCIATICA: Primary | ICD-10-CM

## 2021-08-25 DIAGNOSIS — G89.29 CHRONIC BILATERAL LOW BACK PAIN WITH BILATERAL SCIATICA: Primary | ICD-10-CM

## 2021-08-25 LAB
C TRACH RRNA SPEC QL NAA+PROBE: NEGATIVE
N GONORRHOEA RRNA SPEC QL NAA+PROBE: NEGATIVE
T VAGINALIS DNA SPEC QL NAA+PROBE: NEGATIVE

## 2021-08-25 PROCEDURE — 97161 PT EVAL LOW COMPLEX 20 MIN: CPT | Performed by: PHYSICAL THERAPIST

## 2021-08-25 NOTE — PATIENT INSTRUCTIONS
Patient was educated on findings of evaluation, purpose of treatment, and goals for therapy.   Treatment options discussed and questions answered.  Patient was educated on exercises/self treatment/pain relief techniques.    Access Code: [email or web address]  URL: [email or web address]  Date: 08/25/2021  Prepared by: Alexus Fairchild    Exercises  Hooklying Transversus Abdominis Palpation - 2 x daily - 7 x weekly - 1 sets - 10 reps - 3 hold  Hooklying Gluteal Sets - 2 x daily - 7 x weekly - 1 sets - 10 reps - 3 hold  Supine Hip Adduction Isometric with Ball - 2 x daily - 7 x weekly - 1 sets - 10 reps - 3 hold

## 2021-08-27 ENCOUNTER — LAB (OUTPATIENT)
Dept: LAB | Facility: SURGERY CENTER | Age: 41
End: 2021-08-27

## 2021-08-27 DIAGNOSIS — Z01.818 OTHER SPECIFIED PRE-OPERATIVE EXAMINATION: ICD-10-CM

## 2021-08-27 LAB — SARS-COV-2 ORF1AB RESP QL NAA+PROBE: NOT DETECTED

## 2021-08-27 PROCEDURE — C9803 HOPD COVID-19 SPEC COLLECT: HCPCS

## 2021-08-27 PROCEDURE — U0004 COV-19 TEST NON-CDC HGH THRU: HCPCS | Performed by: SURGERY

## 2021-08-30 ENCOUNTER — HOSPITAL ENCOUNTER (OUTPATIENT)
Facility: SURGERY CENTER | Age: 41
Setting detail: HOSPITAL OUTPATIENT SURGERY
Discharge: HOME | End: 2021-08-30
Attending: INTERNAL MEDICINE | Admitting: INTERNAL MEDICINE

## 2021-08-30 ENCOUNTER — ANESTHESIA EVENT (OUTPATIENT)
Dept: SURGERY | Facility: SURGERY CENTER | Age: 41
End: 2021-08-30

## 2021-08-30 ENCOUNTER — ANESTHESIA (OUTPATIENT)
Dept: SURGERY | Facility: SURGERY CENTER | Age: 41
End: 2021-08-30

## 2021-08-30 VITALS
DIASTOLIC BLOOD PRESSURE: 56 MMHG | WEIGHT: 198 LBS | BODY MASS INDEX: 31.08 KG/M2 | RESPIRATION RATE: 16 BRPM | HEART RATE: 61 BPM | HEIGHT: 67 IN | SYSTOLIC BLOOD PRESSURE: 95 MMHG | TEMPERATURE: 97.8 F | OXYGEN SATURATION: 99 %

## 2021-08-30 DIAGNOSIS — K27.9 PUD (PEPTIC ULCER DISEASE): ICD-10-CM

## 2021-08-30 DIAGNOSIS — K21.9 GASTROESOPHAGEAL REFLUX DISEASE, UNSPECIFIED WHETHER ESOPHAGITIS PRESENT: ICD-10-CM

## 2021-08-30 DIAGNOSIS — R68.81 EARLY SATIETY: ICD-10-CM

## 2021-08-30 DIAGNOSIS — A04.8 H. PYLORI INFECTION: ICD-10-CM

## 2021-08-30 LAB
B-HCG UR QL: NEGATIVE
INTERNAL NEGATIVE CONTROL: NEGATIVE
INTERNAL POSITIVE CONTROL: POSITIVE
Lab: NORMAL

## 2021-08-30 PROCEDURE — 88305 TISSUE EXAM BY PATHOLOGIST: CPT | Performed by: INTERNAL MEDICINE

## 2021-08-30 PROCEDURE — 81025 URINE PREGNANCY TEST: CPT | Performed by: INTERNAL MEDICINE

## 2021-08-30 PROCEDURE — 87081 CULTURE SCREEN ONLY: CPT | Performed by: INTERNAL MEDICINE

## 2021-08-30 PROCEDURE — 43239 EGD BIOPSY SINGLE/MULTIPLE: CPT | Performed by: INTERNAL MEDICINE

## 2021-08-30 PROCEDURE — 25010000002 PROPOFOL 10 MG/ML EMULSION: Performed by: NURSE ANESTHETIST, CERTIFIED REGISTERED

## 2021-08-30 PROCEDURE — 0DB98ZX EXCISION OF DUODENUM, VIA NATURAL OR ARTIFICIAL OPENING ENDOSCOPIC, DIAGNOSTIC: ICD-10-PCS | Performed by: INTERNAL MEDICINE

## 2021-08-30 RX ORDER — PHENTERMINE HYDROCHLORIDE 37.5 MG/1
37.5 CAPSULE ORAL EVERY MORNING
COMMUNITY
End: 2022-04-05

## 2021-08-30 RX ORDER — SODIUM CHLORIDE 0.9 % (FLUSH) 0.9 %
10 SYRINGE (ML) INJECTION AS NEEDED
Status: DISCONTINUED | OUTPATIENT
Start: 2021-08-30 | End: 2021-08-30 | Stop reason: HOSPADM

## 2021-08-30 RX ORDER — SODIUM CHLORIDE 0.9 % (FLUSH) 0.9 %
3 SYRINGE (ML) INJECTION EVERY 12 HOURS SCHEDULED
Status: DISCONTINUED | OUTPATIENT
Start: 2021-08-30 | End: 2021-08-30 | Stop reason: HOSPADM

## 2021-08-30 RX ORDER — MAGNESIUM HYDROXIDE 1200 MG/15ML
LIQUID ORAL AS NEEDED
Status: DISCONTINUED | OUTPATIENT
Start: 2021-08-30 | End: 2021-08-30 | Stop reason: HOSPADM

## 2021-08-30 RX ORDER — PROPOFOL 10 MG/ML
VIAL (ML) INTRAVENOUS AS NEEDED
Status: DISCONTINUED | OUTPATIENT
Start: 2021-08-30 | End: 2021-08-30 | Stop reason: SURG

## 2021-08-30 RX ORDER — SUCRALFATE 1 G/1
1 TABLET ORAL 2 TIMES DAILY
Qty: 60 TABLET | Refills: 1 | Status: SHIPPED | OUTPATIENT
Start: 2021-08-30 | End: 2021-10-27

## 2021-08-30 RX ORDER — LIDOCAINE HYDROCHLORIDE 20 MG/ML
INJECTION, SOLUTION INFILTRATION; PERINEURAL AS NEEDED
Status: DISCONTINUED | OUTPATIENT
Start: 2021-08-30 | End: 2021-08-30 | Stop reason: SURG

## 2021-08-30 RX ORDER — SODIUM CHLORIDE, SODIUM LACTATE, POTASSIUM CHLORIDE, CALCIUM CHLORIDE 600; 310; 30; 20 MG/100ML; MG/100ML; MG/100ML; MG/100ML
30 INJECTION, SOLUTION INTRAVENOUS CONTINUOUS PRN
Status: DISCONTINUED | OUTPATIENT
Start: 2021-08-30 | End: 2021-08-30 | Stop reason: HOSPADM

## 2021-08-30 RX ADMIN — SODIUM CHLORIDE, POTASSIUM CHLORIDE, SODIUM LACTATE AND CALCIUM CHLORIDE 30 ML/HR: 600; 310; 30; 20 INJECTION, SOLUTION INTRAVENOUS at 12:43

## 2021-08-30 RX ADMIN — PROPOFOL 200 MG: 10 INJECTION, EMULSION INTRAVENOUS at 13:34

## 2021-08-30 RX ADMIN — LIDOCAINE HYDROCHLORIDE 50 MG: 20 INJECTION, SOLUTION INFILTRATION; PERINEURAL at 13:34

## 2021-08-30 RX ADMIN — PROPOFOL 300 MCG/KG/MIN: 10 INJECTION, EMULSION INTRAVENOUS at 13:34

## 2021-08-30 NOTE — ANESTHESIA PREPROCEDURE EVALUATION
Anesthesia Evaluation     Patient summary reviewed and Nursing notes reviewed   history of anesthetic complications: PONV  NPO Solid Status: > 8 hours  NPO Liquid Status: > 2 hours           Airway   Mallampati: II  Dental - normal exam     Comment: Risk of dental injury discussed with patient      Pulmonary - normal exam   (+) a smoker Current Abstained day of surgery,   Cardiovascular - normal exam        Neuro/Psych  (+) numbness, psychiatric history Anxiety, Depression and ADHD,     GI/Hepatic/Renal/Endo    (+)  GERD, PUD,      Musculoskeletal     (+) neck pain,   Abdominal    Substance History      OB/GYN          Other                        Anesthesia Plan    ASA 2     MAC       Anesthetic plan, all risks, benefits, and alternatives have been provided, discussed and informed consent has been obtained with: patient.

## 2021-08-30 NOTE — ANESTHESIA POSTPROCEDURE EVALUATION
"Patient: Yadi Syed    Procedure Summary     Date: 08/30/21 Room / Location: [facility] OR  / [facility]    Anesthesia Start: 1330 Anesthesia Stop: 1349    Procedure: ESOPHAGOGASTRODUODENOSCOPY (N/A ) Diagnosis:       PUD (peptic ulcer disease)      Gastroesophageal reflux disease, unspecified whether esophagitis present      Early satiety      H. pylori infection      (PUD (peptic ulcer disease) [K27.9])      (Gastroesophageal reflux disease, unspecified whether esophagitis present [K21.9])      (Early satiety [R68.81])      (H. pylori infection [A04.8])    Surgeons: Francois Ha MD Provider: Lulú Kamara MD    Anesthesia Type: MAC ASA Status: 2          Anesthesia Type: MAC    Vitals  Vitals Value Taken Time   BP 95/56 08/30/21 1350   Temp 36.6 °C (97.8 °F) 08/30/21 1345   Pulse 61 08/30/21 1350   Resp 16 08/30/21 1350   SpO2 99 % 08/30/21 1350           Post Anesthesia Care and Evaluation    Patient location during evaluation: PHASE II  Patient participation: complete - patient participated  Level of consciousness: awake  Pain management: adequate  Airway patency: patent  Anesthetic complications: No anesthetic complications    Cardiovascular status: acceptable  Respiratory status: acceptable  Hydration status: acceptable    Comments: BP 95/56 (BP Location: Left arm, Patient Position: Lying)   Pulse 61   Temp 36.6 °C (97.8 °F) (Temporal)   Resp 16   Ht 170.2 cm (67\")   Wt 89.8 kg (198 lb)   SpO2 99%   BMI 31.01 kg/m²       "

## 2021-09-01 LAB
LAB AP CASE REPORT: NORMAL
LAB AP CLINICAL INFORMATION: NORMAL
PATH REPORT.FINAL DX SPEC: NORMAL
PATH REPORT.GROSS SPEC: NORMAL
UREASE TISS QL: NEGATIVE

## 2021-09-07 DIAGNOSIS — A04.8 H. PYLORI INFECTION: Primary | ICD-10-CM

## 2021-09-07 RX ORDER — METRONIDAZOLE 250 MG/1
250 TABLET ORAL 4 TIMES DAILY
Qty: 56 TABLET | Refills: 0 | Status: SHIPPED | OUTPATIENT
Start: 2021-09-07 | End: 2021-09-21

## 2021-09-07 RX ORDER — BISMUTH SUBSALICYLATE 262 MG
524 TABLET,CHEWABLE ORAL 4 TIMES DAILY
Qty: 112 TABLET | Refills: 0 | Status: SHIPPED | OUTPATIENT
Start: 2021-09-07 | End: 2021-10-27

## 2021-09-07 RX ORDER — TETRACYCLINE HYDROCHLORIDE 500 MG/1
500 CAPSULE ORAL 4 TIMES DAILY
Qty: 56 CAPSULE | Refills: 0 | Status: SHIPPED | OUTPATIENT
Start: 2021-09-07 | End: 2021-10-27

## 2021-09-07 RX ORDER — OMEPRAZOLE 40 MG/1
40 CAPSULE, DELAYED RELEASE ORAL 2 TIMES DAILY
Qty: 28 CAPSULE | Refills: 0 | Status: SHIPPED | OUTPATIENT
Start: 2021-09-07 | End: 2021-09-21

## 2021-09-08 NOTE — PROGRESS NOTES
Subjective   History of Present Illness: Yadi Syed is a 41 y.o. female is being seen for consultation today at the request of Bret Cain MD for intermittent back pain that radiates into bilateral legs; right greater than left with numbness, tingling and weakness. She does not have any onset event and says that the pain has been ongoing and progressive but intermittent over the last 10 years. She she is currently in physical therapy but has not really noticed much difference yet although has an expectation that it probably will help in the long run. She does have a numb sensation in the fingertips as well as the third through fifth digits of both toes. She denies weakness. She denies bowel or bladder symptoms.    While in the room and during my examination of the patient I wore a mask and eye protection.  I washed my hands before and after this patient encounter.  The patient was also wearing a mask.    Back Pain  The current episode started more than 1 year ago. The problem occurs intermittently. The problem has been gradually worsening since onset. The pain is present in the lumbar spine. The quality of the pain is described as aching, burning, cramping, shooting and stabbing. The pain radiates to the right thigh, right knee, right foot, left knee, left thigh and left foot. The pain is at a severity of 3/10. The pain is moderate. The symptoms are aggravated by position and sitting. Associated symptoms include numbness and tingling. Pertinent negatives include no bladder incontinence, bowel incontinence or chest pain.       The following portions of the patient's history were reviewed and updated as appropriate: allergies, current medications, past family history, past medical history, past social history, past surgical history and problem list.    Review of Systems   Constitutional: Positive for activity change.   HENT: Negative for congestion.    Eyes: Negative for visual disturbance.   Respiratory:  "Negative for chest tightness and shortness of breath.    Cardiovascular: Negative for chest pain.   Gastrointestinal: Negative for bowel incontinence and nausea.   Endocrine: Negative for cold intolerance and heat intolerance.   Genitourinary: Negative for bladder incontinence.   Musculoskeletal: Positive for back pain.   Skin: Negative for rash.   Allergic/Immunologic: Negative for environmental allergies.   Neurological: Positive for tingling and numbness.   Hematological: Does not bruise/bleed easily.   Psychiatric/Behavioral: Positive for sleep disturbance.       Objective     Vitals:    09/14/21 0926   BP: 126/74   Temp: 98 °F (36.7 °C)   Weight: 90.3 kg (199 lb)   Height: 170.2 cm (67\")     Body mass index is 31.17 kg/m².      Physical Exam  Neurologic Exam    Physical Exam:    CONSTITUTIONAL: This 41 year old   female appears well developed, well-nourished and in no acute distress.    HEAD & FACE: the head and face are symmetric, normocephalic and atraumatic.    EYES: Inspection of the conjunctivae and lids reveals no swelling, erythema or discharge.  Pupils are round, equal and reactive to light and there is no scleral icterus.    EARS, NOSE, MOUTH & THROAT: On inspection, the ears and nose are within normal limits.    NECK: the neck is supple and symmetric. The trachea is midline with no masses.    PULMONARY: Respiratory effort is normal with no increased work of breathing or signs of respiratory distress.    CARDIOVASCULAR: Pedal pulses are +2/4 bilaterally. Examination of the extremities shows no edema or varicosities.    LYMPHATIC: There is no palpable lymphadenopathy of the neck.    MUSCULOSKELETAL: Gait and station are within normal limits. The spine has normal alignment and range of motion. SI joints are nontender bilaterally    SKIN: The skin is warm, dry and intact    NEUROLOGIC:   Cranial Nerves 2-12 intact  Normal motor strength noted. Muscle bulk and tone are normal.  Sensory exam is " "normal to fine touch to confrontational testing bilaterally  Reflexes on the right side demonstrates 2/4 Triceps Reflex, 2/4 Biceps Reflex, 2/4 Brachioradialis Reflex, 2/4 Knee Jerk Reflex, 2/4 Ankle Jerk Reflex and no ankle clonus on the right.   Reflexes on the left side demonstrates 2/4 Triceps Reflex, 2/4 Biceps Reflex, 2/4 Brachioradialis Reflex, 2/4 Knee Jerk Reflex, 2/4 Ankle Jerk Reflex and no ankle clonus on the left.  Superficial/Primitive Reflexes: primitive reflexes were absent.  Ferris's, Babinski, and Clonus signs all negative.  No coordination deficit observed.  Radicular testing showed a negative Herson (MARTIN) test and negative straight leg raise. She does get right SI joint pain with straight leg raising test on the right.  Cortical function is intact and without deficits. Speech is normal.    PSYCHIATRIC: oriented to person, place and time. Patient's mood and affect are normal.    Assessment/Plan   Independent Review of Radiographic Studies:      I personally reviewed the images from the following studies.    MRI of the lumbar spine done at [facility] on August 16, 2021 reveals lumbar spondylosis most noted at L5-S1. There does appear to be a central disc herniation with slight inferior migration with the potential for bilateral S1 nerve root contact.    Medical Decision Making:      Her pattern of pain is predominantly back pain although she does get occasionally right greater than left sciatic distribution pain when her back \"goes out\". She has fairly isolated degenerative change at the L5-S1 level with a central disc protrusion/herniation with only mild lateral recess narrowing bilaterally. Since she does not have persistent radiculopathy and the predominance of pain is back pain she would not be a candidate for any standard operative intervention. Since she has not responded to the physical therapy she would be a good candidate to consider interventional pain management " techniques. He is willing to pursue this.    If she does not respond favorably and continues to have predominance of back pain she may be a candidate to consider evaluation for an anterior lumbar interbody fusion at L5-S1 which is not available through [facility]. She would have to see a spinal subspecialist through the [facility] or [facility] to be evaluated for ALIF.    No follow-ups on file.    Diagnoses and all orders for this visit:    1. Chronic bilateral low back pain with right-sided sciatica (Primary)  -     Ambulatory Referral to Pain Management    2. Lumbar disc herniation with radiculopathy  -     Ambulatory Referral to Pain Management             Alfonso Medina MD FACS FAANS  Neurological Surgery

## 2021-09-13 ENCOUNTER — OFFICE VISIT (OUTPATIENT)
Dept: INTERNAL MEDICINE | Facility: CLINIC | Age: 41
End: 2021-09-13

## 2021-09-13 ENCOUNTER — TREATMENT (OUTPATIENT)
Dept: PHYSICAL THERAPY | Facility: CLINIC | Age: 41
End: 2021-09-13

## 2021-09-13 VITALS
OXYGEN SATURATION: 97 % | HEART RATE: 70 BPM | BODY MASS INDEX: 31.23 KG/M2 | SYSTOLIC BLOOD PRESSURE: 110 MMHG | HEIGHT: 67 IN | TEMPERATURE: 97.6 F | DIASTOLIC BLOOD PRESSURE: 64 MMHG | RESPIRATION RATE: 18 BRPM | WEIGHT: 199 LBS

## 2021-09-13 DIAGNOSIS — M51.26 LUMBAR DISC HERNIATION: ICD-10-CM

## 2021-09-13 DIAGNOSIS — A04.8 H. PYLORI INFECTION: ICD-10-CM

## 2021-09-13 DIAGNOSIS — R11.0 NAUSEA: Primary | ICD-10-CM

## 2021-09-13 DIAGNOSIS — G89.29 CHRONIC BILATERAL LOW BACK PAIN WITH BILATERAL SCIATICA: Primary | ICD-10-CM

## 2021-09-13 DIAGNOSIS — M54.41 CHRONIC BILATERAL LOW BACK PAIN WITH BILATERAL SCIATICA: Primary | ICD-10-CM

## 2021-09-13 DIAGNOSIS — M54.42 CHRONIC BILATERAL LOW BACK PAIN WITH BILATERAL SCIATICA: Primary | ICD-10-CM

## 2021-09-13 PROCEDURE — 97012 MECHANICAL TRACTION THERAPY: CPT | Performed by: PHYSICAL THERAPIST

## 2021-09-13 PROCEDURE — 97110 THERAPEUTIC EXERCISES: CPT | Performed by: PHYSICAL THERAPIST

## 2021-09-13 PROCEDURE — 99214 OFFICE O/P EST MOD 30 MIN: CPT | Performed by: INTERNAL MEDICINE

## 2021-09-13 RX ORDER — ONDANSETRON 4 MG/1
4 TABLET, ORALLY DISINTEGRATING ORAL EVERY 8 HOURS PRN
Qty: 8 TABLET | Refills: 0 | Status: SHIPPED | OUTPATIENT
Start: 2021-09-13 | End: 2022-01-20

## 2021-09-13 NOTE — PROGRESS NOTES
Physical Therapy Daily Progress Note        Patient: Yadi Syed   : 1980  Diagnosis/ICD-10 Code:  Chronic bilateral low back pain with bilateral sciatica [M54.42, M54.41, G89.29]  Referring practitioner: Bret Cain MD  Date of Initial Visit: Type: THERAPY  Noted: 2021  Today's Date: 2021  Patient seen for 2 sessions         Yadi Syed reports: she was achy after the eval and it kind of flared up her sciatic nerve.  She reported no pain right now.         Subjective     Objective   See Exercise, Manual, and Modality Logs for complete treatment.     Negative for pelvic innominate    Assessment/Plan  Initiated core strengthening and gentle piriformis/IT band stretch.  She reported no complaints however some non-verbal signs of discomfort present.  Initiated mechanical traction at low level pull d/t report of inflammation following use of inversion table at home.  She reported little stretch during however relief reported following.  Will continue to monitor tolerance to exercises and progress stabilization and traction as tolerated to relieve pain during daily routine.     Progress per Plan of Care           Manual Therapy:         mins  77422;  Therapeutic Exercise:    23     mins  21935;     Neuromuscular Dennis:        mins  49722;    Therapeutic Activity:          mins  74356;     Gait Training:           mins  68252;     Ultrasound:          mins  85545;    Electrical Stimulation:         mins  19560 ( );  Dry Needling          mins self-pay  Traction  10 mins    Timed Treatment:   23   mins   Total Treatment:     38   mins    Lilliam Hilton PTA  Physical Therapist Assistant

## 2021-09-13 NOTE — PROGRESS NOTES
"Chief Complaint  Abdominal Pain (H pylori/ follow up )    Subjective          Yadi Syed presents to [facility]  Recent EGD, diagnosed with H pylori and started on bismuth quad therapy; having some nausea    Low back pain, has epidural injection this week, is looking forward to this; still with some pain      Objective   Vital Signs:   /64   Pulse 70   Temp 97.6 °F (36.4 °C)   Resp 18   Ht 170.2 cm (67\")   Wt 90.3 kg (199 lb)   SpO2 97%   BMI 31.17 kg/m²     Physical Exam  Vitals and nursing note reviewed.   Constitutional:       Appearance: Normal appearance.   HENT:      Head: Normocephalic and atraumatic.      Right Ear: External ear normal.      Left Ear: External ear normal.      Nose: Nose normal.      Mouth/Throat:      Mouth: Mucous membranes are moist.      Pharynx: Oropharynx is clear.   Eyes:      Extraocular Movements: Extraocular movements intact.      Conjunctiva/sclera: Conjunctivae normal.   Pulmonary:      Effort: Pulmonary effort is normal. No respiratory distress.   Musculoskeletal:         General: Normal range of motion.      Cervical back: Normal range of motion.   Skin:     Findings: No rash.   Neurological:      General: No focal deficit present.      Mental Status: She is alert. Mental status is at baseline.   Psychiatric:         Mood and Affect: Mood normal.         Behavior: Behavior normal.         Thought Content: Thought content normal.         Judgment: Judgment normal.        Result Review :                 Assessment and Plan    Diagnoses and all orders for this visit:    1. Nausea (Primary)  -     ondansetron ODT (Zofran ODT) 4 MG disintegrating tablet; Place 1 tablet on the tongue Every 8 (Eight) Hours As Needed for Nausea or Vomiting.  Dispense: 8 tablet; Refill: 0    2. H. pylori infection    3. Lumbar disc herniation    - continue bismuth quad therapy, discussed risks and benefits, ok for ondansetron as needed  - " keep epidural appt, doing well with supportive care but feel this will help a lot  - rtc to follow up    Follow Up   No follow-ups on file.  Patient was given instructions and counseling regarding her condition or for health maintenance advice. Please see specific information pulled into the AVS if appropriate.

## 2021-09-14 ENCOUNTER — HOSPITAL ENCOUNTER (OUTPATIENT)
Dept: PAIN MEDICINE | Facility: HOSPITAL | Age: 41
End: 2021-09-14

## 2021-09-14 ENCOUNTER — OFFICE VISIT (OUTPATIENT)
Dept: NEUROSURGERY | Facility: CLINIC | Age: 41
End: 2021-09-14

## 2021-09-14 VITALS
SYSTOLIC BLOOD PRESSURE: 126 MMHG | BODY MASS INDEX: 31.23 KG/M2 | DIASTOLIC BLOOD PRESSURE: 74 MMHG | WEIGHT: 199 LBS | TEMPERATURE: 98 F | HEIGHT: 67 IN

## 2021-09-14 DIAGNOSIS — M54.41 CHRONIC BILATERAL LOW BACK PAIN WITH RIGHT-SIDED SCIATICA: Primary | ICD-10-CM

## 2021-09-14 DIAGNOSIS — M51.16 LUMBAR DISC HERNIATION WITH RADICULOPATHY: ICD-10-CM

## 2021-09-14 DIAGNOSIS — G89.29 CHRONIC BILATERAL LOW BACK PAIN WITH RIGHT-SIDED SCIATICA: Primary | ICD-10-CM

## 2021-09-14 PROCEDURE — 99204 OFFICE O/P NEW MOD 45 MIN: CPT | Performed by: NEUROLOGICAL SURGERY

## 2021-09-15 ENCOUNTER — TELEPHONE (OUTPATIENT)
Dept: PHYSICAL THERAPY | Facility: CLINIC | Age: 41
End: 2021-09-15

## 2021-09-17 ENCOUNTER — PATIENT ROUNDING (BHMG ONLY) (OUTPATIENT)
Dept: NEUROSURGERY | Facility: CLINIC | Age: 41
End: 2021-09-17

## 2021-09-17 NOTE — PROGRESS NOTES
September 17, 2021    Hello, may I speak with Yadi Syed?    My name is Vandana Rodriguez, Clinical Coordinator      I am  with [facility]  [address] [facility] [address]  [place] [address] [phone].    Before we get started may I verify your date of birth? 1980    I am calling to officially welcome you to our practice and ask about your recent visit. Is this a good time to talk? no    Tell me about your visit with us. What things went well?  Patient was unable to answer.  I left her a voicemail welcoming her to the practice and to call with any questions or concerns.        We're always looking for ways to make our patients' experiences even better. Do you have recommendations on ways we may improve?  N/A    Overall were you satisfied with your first visit to our practice? N/A       I appreciate you taking the time to speak with me today. Is there anything else I can do for you? N/A    Thank you, and have a great day.

## 2021-09-20 ENCOUNTER — TELEPHONE (OUTPATIENT)
Dept: PHYSICAL THERAPY | Facility: CLINIC | Age: 41
End: 2021-09-20

## 2021-10-06 DIAGNOSIS — G62.9 POLYNEUROPATHY, UNSPECIFIED: ICD-10-CM

## 2021-10-07 RX ORDER — GABAPENTIN 300 MG/1
CAPSULE ORAL
Qty: 180 CAPSULE | Refills: 0 | Status: SHIPPED | OUTPATIENT
Start: 2021-10-07 | End: 2021-12-02

## 2021-10-07 NOTE — TELEPHONE ENCOUNTER
Rx Refill Note  Requested Prescriptions     Pending Prescriptions Disp Refills   • gabapentin (NEURONTIN) 300 MG capsule [Pharmacy Med Name: Gabapentin 300 MG Oral Capsule] 180 capsule 0     Sig: TAKE 2 CAPSULES BY MOUTH THREE TIMES DAILY      Last office visit with prescribing clinician: 9/13/2021      Next office visit with prescribing clinician: Visit date not found            Joanne Brumfield MA  10/07/21, 07:25 EDT

## 2021-10-08 ENCOUNTER — HOSPITAL ENCOUNTER (OUTPATIENT)
Dept: NUCLEAR MEDICINE | Facility: HOSPITAL | Age: 41
Discharge: HOME | End: 2021-10-08

## 2021-10-08 DIAGNOSIS — K21.9 GASTROESOPHAGEAL REFLUX DISEASE, UNSPECIFIED WHETHER ESOPHAGITIS PRESENT: ICD-10-CM

## 2021-10-08 DIAGNOSIS — R68.81 EARLY SATIETY: ICD-10-CM

## 2021-10-08 PROCEDURE — A9541 TC99M SULFUR COLLOID: HCPCS | Performed by: INTERNAL MEDICINE

## 2021-10-08 PROCEDURE — 78264 GASTRIC EMPTYING IMG STUDY: CPT

## 2021-10-08 PROCEDURE — 0 TECHNETIUM SULFUR COLLOID: Performed by: INTERNAL MEDICINE

## 2021-10-08 RX ADMIN — TECHNETIUM TC 99M SULFUR COLLOID 1 DOSE: KIT at 08:09
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
## 2021-10-27 ENCOUNTER — OFFICE VISIT (OUTPATIENT)
Dept: PAIN MEDICINE | Facility: CLINIC | Age: 41
End: 2021-10-27

## 2021-10-27 VITALS
HEIGHT: 67 IN | BODY MASS INDEX: 29.29 KG/M2 | HEART RATE: 91 BPM | TEMPERATURE: 96.9 F | SYSTOLIC BLOOD PRESSURE: 97 MMHG | DIASTOLIC BLOOD PRESSURE: 64 MMHG | RESPIRATION RATE: 18 BRPM | OXYGEN SATURATION: 98 % | WEIGHT: 186.6 LBS

## 2021-10-27 DIAGNOSIS — M53.3 SACROILIAC JOINT PAIN: ICD-10-CM

## 2021-10-27 DIAGNOSIS — M51.26 DISPLACEMENT OF LUMBAR INTERVERTEBRAL DISC WITHOUT MYELOPATHY: ICD-10-CM

## 2021-10-27 DIAGNOSIS — M51.16 LUMBAR DISC HERNIATION WITH RADICULOPATHY: Primary | ICD-10-CM

## 2021-10-27 PROCEDURE — 99204 OFFICE O/P NEW MOD 45 MIN: CPT | Performed by: ANESTHESIOLOGY

## 2021-10-27 RX ORDER — ARIPIPRAZOLE 5 MG/1
TABLET ORAL
COMMUNITY
Start: 2021-10-19 | End: 2022-05-31

## 2021-12-01 DIAGNOSIS — G62.9 POLYNEUROPATHY, UNSPECIFIED: ICD-10-CM

## 2021-12-02 RX ORDER — GABAPENTIN 300 MG/1
CAPSULE ORAL
Qty: 180 CAPSULE | Refills: 0 | Status: SHIPPED | OUTPATIENT
Start: 2021-12-02 | End: 2022-01-17

## 2021-12-02 NOTE — TELEPHONE ENCOUNTER
Rx Refill Note  Requested Prescriptions     Pending Prescriptions Disp Refills   • gabapentin (NEURONTIN) 300 MG capsule [Pharmacy Med Name: Gabapentin 300 MG Oral Capsule] 180 capsule 0     Sig: TAKE 2 CAPSULES BY MOUTH THREE TIMES DAILY      Last office visit with prescribing clinician: 9/13/2021      Next office visit with prescribing clinician: Visit date not found            Aleena Santiago MA  12/02/21, 07:57 EST

## 2022-01-17 DIAGNOSIS — G62.9 POLYNEUROPATHY, UNSPECIFIED: ICD-10-CM

## 2022-01-17 RX ORDER — GABAPENTIN 300 MG/1
CAPSULE ORAL
Qty: 180 CAPSULE | Refills: 1 | Status: SHIPPED | OUTPATIENT
Start: 2022-01-17 | End: 2022-04-05 | Stop reason: SDUPTHER

## 2022-01-17 NOTE — TELEPHONE ENCOUNTER
Rx Refill Note  Requested Prescriptions     Pending Prescriptions Disp Refills   • gabapentin (NEURONTIN) 300 MG capsule [Pharmacy Med Name: Gabapentin 300 MG Oral Capsule] 180 capsule 0     Sig: TAKE 2 CAPSULES BY MOUTH THREE TIMES DAILY      Last office visit with prescribing clinician: 6/14/2021      Next office visit with prescribing clinician: Visit date not found            Aleena Santiago MA  01/17/22, 14:47 EST

## 2022-01-19 DIAGNOSIS — R11.0 NAUSEA: ICD-10-CM

## 2022-01-19 NOTE — TELEPHONE ENCOUNTER
Rx Refill Note  Requested Prescriptions     Pending Prescriptions Disp Refills   • ondansetron ODT (ZOFRAN-ODT) 4 MG disintegrating tablet [Pharmacy Med Name: Ondansetron 4 MG Oral Tablet Disintegrating] 8 tablet 0     Sig: DISSOLVE 1 TABLET IN MOUTH EVERY 8 HOURS AS NEEDED FOR NAUSEA FOR VOMITING      Last office visit with prescribing clinician: 9/13/2021      Next office visit with prescribing clinician: Visit date not found            Sreedhar Gillette MA  01/19/22, 16:50 EST

## 2022-01-20 RX ORDER — ONDANSETRON 4 MG/1
TABLET, ORALLY DISINTEGRATING ORAL
Qty: 8 TABLET | Refills: 0 | Status: SHIPPED | OUTPATIENT
Start: 2022-01-20 | End: 2022-05-25

## 2022-03-22 DIAGNOSIS — G62.9 POLYNEUROPATHY, UNSPECIFIED: ICD-10-CM

## 2022-03-23 RX ORDER — GABAPENTIN 300 MG/1
CAPSULE ORAL
Qty: 180 CAPSULE | Refills: 0 | OUTPATIENT
Start: 2022-03-23

## 2022-03-23 NOTE — TELEPHONE ENCOUNTER
Rx Refill Note  Requested Prescriptions     Pending Prescriptions Disp Refills   • gabapentin (NEURONTIN) 300 MG capsule [Pharmacy Med Name: Gabapentin 300 MG Oral Capsule] 180 capsule 0     Sig: TAKE 2 CAPSULES BY MOUTH THREE TIMES DAILY      Last office visit with prescribing clinician: 6/14/2021      Next office visit with prescribing clinician: Visit date not found            Joanne Brumfield MA  03/23/22, 07:39 EDT

## 2022-03-25 ENCOUNTER — TELEPHONE (OUTPATIENT)
Dept: INTERNAL MEDICINE | Facility: CLINIC | Age: 42
End: 2022-03-25

## 2022-03-25 NOTE — TELEPHONE ENCOUNTER
"SEE \"FYI\" MESSAGE FROM PATIENT, PLEASE.    I S/W HER YESTERDAY AFTER TALKING TO YOU AND RELAYED YOUR MESSAGE REGARDING GETTING HER MEDICATION REFILLED.    THANK YOU  "

## 2022-03-25 NOTE — TELEPHONE ENCOUNTER
Caller: Yadi Syed    Relationship: Self    Best call back number:   [phone]     What is the best time to reach you: ANYTIME     Who are you requesting to speak with (clinical staff, provider,  specific staff member): CLINICAL STAFF     What was the call regarding: PATIENTS MEDICATION REFILL WAS DENIED SO PATIENT MADE AN APPOINTMENT FOR 4/5/22 BUT PATIENT WANTS TO LET DR DE SANTIAGO KNOW THAT SHE WILL BE OUT OF HER MEDICATION BY THEN.     Do you require a callback: YES IF THERE ARE ANY QUESTIONS

## 2022-03-25 NOTE — TELEPHONE ENCOUNTER
Ok, I believe this was in regards to her gabapentin, so unfortunately I cannot give an early refill bc it is a controlled susbtance and I have to see her back every 6 mos for this, will make sure we get it taken care of when she comes in

## 2022-04-05 ENCOUNTER — OFFICE VISIT (OUTPATIENT)
Dept: INTERNAL MEDICINE | Facility: CLINIC | Age: 42
End: 2022-04-05

## 2022-04-05 VITALS
RESPIRATION RATE: 16 BRPM | BODY MASS INDEX: 29.98 KG/M2 | HEART RATE: 91 BPM | HEIGHT: 67 IN | SYSTOLIC BLOOD PRESSURE: 118 MMHG | WEIGHT: 191 LBS | DIASTOLIC BLOOD PRESSURE: 72 MMHG | OXYGEN SATURATION: 100 % | TEMPERATURE: 96.8 F

## 2022-04-05 DIAGNOSIS — G62.9 POLYNEUROPATHY, UNSPECIFIED: Primary | ICD-10-CM

## 2022-04-05 DIAGNOSIS — M70.62 TROCHANTERIC BURSITIS OF BOTH HIPS: Chronic | ICD-10-CM

## 2022-04-05 DIAGNOSIS — F41.9 ANXIETY AND DEPRESSION: ICD-10-CM

## 2022-04-05 DIAGNOSIS — M70.61 TROCHANTERIC BURSITIS OF BOTH HIPS: Chronic | ICD-10-CM

## 2022-04-05 DIAGNOSIS — F32.A ANXIETY AND DEPRESSION: ICD-10-CM

## 2022-04-05 PROCEDURE — 99214 OFFICE O/P EST MOD 30 MIN: CPT | Performed by: INTERNAL MEDICINE

## 2022-04-05 PROCEDURE — 20610 DRAIN/INJ JOINT/BURSA W/O US: CPT | Performed by: INTERNAL MEDICINE

## 2022-04-05 RX ORDER — GABAPENTIN 300 MG/1
600 CAPSULE ORAL 3 TIMES DAILY
Qty: 540 CAPSULE | Refills: 1 | Status: SHIPPED | OUTPATIENT
Start: 2022-04-05 | End: 2022-08-30

## 2022-04-05 RX ORDER — TRIAMCINOLONE ACETONIDE 40 MG/ML
20 INJECTION, SUSPENSION INTRA-ARTICULAR; INTRAMUSCULAR
Status: COMPLETED | OUTPATIENT
Start: 2022-04-05 | End: 2022-04-05

## 2022-04-05 RX ORDER — TRIAMCINOLONE ACETONIDE 40 MG/ML
40 INJECTION, SUSPENSION INTRA-ARTICULAR; INTRAMUSCULAR
Status: COMPLETED | OUTPATIENT
Start: 2022-04-05 | End: 2022-04-05

## 2022-04-05 RX ORDER — DULOXETIN HYDROCHLORIDE 30 MG/1
30 CAPSULE, DELAYED RELEASE ORAL DAILY
Qty: 90 CAPSULE | Refills: 1 | Status: SHIPPED | OUTPATIENT
Start: 2022-04-05 | End: 2022-11-02

## 2022-04-05 RX ORDER — BUPROPION HYDROCHLORIDE 150 MG/1
150 TABLET ORAL DAILY
Qty: 90 TABLET | Refills: 1 | Status: SHIPPED | OUTPATIENT
Start: 2022-04-05 | End: 2022-11-02

## 2022-04-05 RX ADMIN — TRIAMCINOLONE ACETONIDE 20 MG: 40 INJECTION, SUSPENSION INTRA-ARTICULAR; INTRAMUSCULAR at 15:51

## 2022-04-05 RX ADMIN — TRIAMCINOLONE ACETONIDE 40 MG: 40 INJECTION, SUSPENSION INTRA-ARTICULAR; INTRAMUSCULAR at 15:51

## 2022-04-05 NOTE — PROGRESS NOTES
"Chief Complaint  Follow-up, Anxiety, Depression, and Pain    Subjective          Yadi Syed presents to [facility] for follow up and med refills. Pt taking all medications daily as prescribed with good reported compliance. No issues or side effects with meds.   Has gabapentin for use with neuropathy, has been doing better lately and has been able to cut back on use which is why her Rx lasted longer.   Her major issue is still her hips, was diagnosed with trochanteric bursitis in the past but is uncontrolled. Pain will wake her up from sleep in the morning when she rolls over. Not sure if she has ever had any injections related to this pain.   Has seen pain mgmt, last Oct 2021, but has not been back because things have been relatively controlled.     Objective   Vital Signs:     /72   Pulse 91   Temp 96.8 °F (36 °C)   Resp 16   Ht 170.2 cm (67\")   Wt 86.6 kg (191 lb)   SpO2 100%   BMI 29.91 kg/m²       Physical Exam  Vitals and nursing note reviewed.   Constitutional:       General: She is not in acute distress.     Appearance: Normal appearance.   Cardiovascular:      Rate and Rhythm: Normal rate and regular rhythm.      Pulses: Normal pulses.      Heart sounds: Normal heart sounds. No murmur heard.  Pulmonary:      Effort: Pulmonary effort is normal. No respiratory distress.      Breath sounds: Normal breath sounds.   Abdominal:      General: Abdomen is flat. Bowel sounds are normal.      Palpations: Abdomen is soft.      Tenderness: There is no abdominal tenderness.   Musculoskeletal:      Right lower leg: No edema.      Left lower leg: No edema.      Comments: TTP over trochanteric bursa L>R with resultant tenderness down IT band and stiffness in gait upon standing from seated position   Neurological:      Mental Status: She is alert and oriented to person, place, and time. Mental status is at baseline.   Psychiatric:         Mood and Affect: Mood " normal.         Behavior: Behavior normal.          Result Review : :   Reviewed by Génesis Azlu MD on 4/5/2022 17:40 EDT  Data: MRI L spine 8/21     Assessment and Plan      Diagnoses and all orders for this visit:    1. Polyneuropathy, unspecified (Primary)  Assessment & Plan:  CONTROLLED  - cont gabapentin use, Rx for 600 mg TID but has been doing better lately and will often be able to just take twice daily  - Reviewed controlled nature of substance, potential for abuse/addiction, and possible adverse effects of medication. No red flags for abuse at this time.   - Kwadwo checked and appropriate.         Orders:  -     gabapentin (NEURONTIN) 300 MG capsule; Take 2 capsules by mouth 3 (Three) Times a Day.  Dispense: 540 capsule; Refill: 1    2. Anxiety and depression  Assessment & Plan:  CONTROLLED  - cont wellbutrin and cymbalta at current doses--> refills sent today  - Reviewed potential side effects of medication including sexual performance changes, insomnia, fatigue, weight changes. Pt tolerating well without any issues.       Orders:  -     buPROPion XL (WELLBUTRIN XL) 150 MG 24 hr tablet; Take 1 tablet by mouth Daily.  Dispense: 90 tablet; Refill: 1  -     DULoxetine (CYMBALTA) 30 MG capsule; Take 1 capsule by mouth Daily.  Dispense: 90 capsule; Refill: 1    3. Trochanteric bursitis of both hips  -     Arthrocentesis  -     triamcinolone acetonide (KENALOG-40) injection 20 mg  -     triamcinolone acetonide (KENALOG-40) injection 40 mg    Arthrocentesis    Date/Time: 4/5/2022 3:51 PM  Performed by: Génesis Azul MD  Authorized by: Génesis Azul MD   Indications: pain   Body area: hip  Joint: right hip  Local anesthesia used: yes    Anesthesia:  Local anesthesia used: yes  Local Anesthetic: lidocaine 2% without epinephrine  Anesthetic total: 2 mL    Sedation:  Patient sedated: no    Preparation: Patient was prepped and draped in the usual sterile fashion.  Needle gauge: 23G.  Ultrasound  guidance: no  Approach: lateral  Patient tolerance: patient tolerated the procedure well with no immediate complications  Comments: Indication: trochanteric bursitis            Follow Up   Return in about 6 months (around 10/5/2022) for follow up.    Patient was given instructions and counseling regarding her condition or for health maintenance advice. Please see specific information pulled into the AVS if appropriate.     Lore Azul MD  [facility]  Phone: [phone]  Fax: [phone]

## 2022-04-05 NOTE — ASSESSMENT & PLAN NOTE
CONTROLLED  - cont gabapentin use, Rx for 600 mg TID but has been doing better lately and will often be able to just take twice daily  - Reviewed controlled nature of substance, potential for abuse/addiction, and possible adverse effects of medication. No red flags for abuse at this time.   - Kwadwo checked and appropriate.

## 2022-04-06 DIAGNOSIS — J30.2 SEASONAL ALLERGIES: ICD-10-CM

## 2022-04-06 DIAGNOSIS — M51.26 LUMBAR DISC HERNIATION: ICD-10-CM

## 2022-04-06 NOTE — TELEPHONE ENCOUNTER
Rx Refill Note  Requested Prescriptions     Pending Prescriptions Disp Refills   • levocetirizine (XYZAL) 5 MG tablet [Pharmacy Med Name: Levocetirizine Dihydrochloride 5 MG Oral Tablet] 30 tablet 0     Sig: TAKE 1 TABLET BY MOUTH ONCE DAILY IN THE EVENING      Last office visit with prescribing clinician: 4/5/2022      Next office visit with prescribing clinician: Visit date not found            Joanne Brumfield MA  04/06/22, 16:03 EDT

## 2022-04-06 NOTE — TELEPHONE ENCOUNTER
Rx Refill Note  Requested Prescriptions     Pending Prescriptions Disp Refills   • diclofenac-miSOPROStol (ARTHROTEC 50) 50-0.2 MG EC tablet [Pharmacy Med Name: Diclofenac-miSOPROStol 50-0.2 MG Oral Tablet Delayed Release] 90 tablet 0     Sig: TAKE 1 TABLET BY MOUTH THREE TIMES DAILY AS NEEDED FOR PAIN      Last office visit with prescribing clinician: 9/13/2021      Next office visit with prescribing clinician: Visit date not found            Joanne Brumfield MA  04/06/22, 16:03 EDT

## 2022-04-07 RX ORDER — DICLOFENAC SODIUM AND MISOPROSTOL 50; 200 MG/1; UG/1
TABLET, DELAYED RELEASE ORAL
Qty: 90 TABLET | Refills: 0 | Status: SHIPPED | OUTPATIENT
Start: 2022-04-07 | End: 2023-02-14 | Stop reason: SDUPTHER

## 2022-04-07 RX ORDER — LEVOCETIRIZINE DIHYDROCHLORIDE 5 MG/1
TABLET, FILM COATED ORAL
Qty: 30 TABLET | Refills: 0 | Status: SHIPPED | OUTPATIENT
Start: 2022-04-07 | End: 2022-07-07

## 2022-04-26 ENCOUNTER — OFFICE VISIT (OUTPATIENT)
Dept: INTERNAL MEDICINE | Facility: CLINIC | Age: 42
End: 2022-04-26

## 2022-04-26 VITALS
HEART RATE: 74 BPM | HEIGHT: 67 IN | DIASTOLIC BLOOD PRESSURE: 70 MMHG | TEMPERATURE: 96.6 F | BODY MASS INDEX: 30.92 KG/M2 | WEIGHT: 197 LBS | OXYGEN SATURATION: 100 % | SYSTOLIC BLOOD PRESSURE: 110 MMHG | RESPIRATION RATE: 16 BRPM

## 2022-04-26 DIAGNOSIS — Z12.11 COLON CANCER SCREENING: ICD-10-CM

## 2022-04-26 DIAGNOSIS — M70.62 TROCHANTERIC BURSITIS OF BOTH HIPS: Primary | Chronic | ICD-10-CM

## 2022-04-26 DIAGNOSIS — Z83.71 FAMILY HISTORY OF COLONIC POLYPS: ICD-10-CM

## 2022-04-26 DIAGNOSIS — M70.61 TROCHANTERIC BURSITIS OF BOTH HIPS: Primary | Chronic | ICD-10-CM

## 2022-04-26 PROCEDURE — 99214 OFFICE O/P EST MOD 30 MIN: CPT | Performed by: INTERNAL MEDICINE

## 2022-04-26 PROCEDURE — 20610 DRAIN/INJ JOINT/BURSA W/O US: CPT | Performed by: INTERNAL MEDICINE

## 2022-04-26 RX ORDER — TRIAMCINOLONE ACETONIDE 40 MG/ML
40 INJECTION, SUSPENSION INTRA-ARTICULAR; INTRAMUSCULAR
Status: COMPLETED | OUTPATIENT
Start: 2022-04-26 | End: 2022-04-26

## 2022-04-26 RX ORDER — METHOCARBAMOL 750 MG/1
1500 TABLET, FILM COATED ORAL 3 TIMES DAILY
COMMUNITY
Start: 2022-04-06 | End: 2023-01-05

## 2022-04-26 RX ORDER — TRIAMCINOLONE ACETONIDE 40 MG/ML
20 INJECTION, SUSPENSION INTRA-ARTICULAR; INTRAMUSCULAR
Status: COMPLETED | OUTPATIENT
Start: 2022-04-26 | End: 2022-04-26

## 2022-04-26 RX ADMIN — TRIAMCINOLONE ACETONIDE 40 MG: 40 INJECTION, SUSPENSION INTRA-ARTICULAR; INTRAMUSCULAR at 12:10

## 2022-04-26 RX ADMIN — TRIAMCINOLONE ACETONIDE 20 MG: 40 INJECTION, SUSPENSION INTRA-ARTICULAR; INTRAMUSCULAR at 12:10

## 2022-04-26 NOTE — PROGRESS NOTES
"Chief Complaint  Follow-up and Hip Pain    Subjective          Yadi Syed presents to [facility] for ER follow up. She was seen 1 week ago in ER for acute onset RUQ pain. Pt is s/p cholecystectomy. Had labs and CT done which showed hematuria but no kidney stone on scan and no othe acute findings. Pain has resolved but has been persistently nauseated since then. Did have other contacts that were having similar symptoms at that time so may have been infectious in nature. Worried because her sister just found 15 polyps in her colon.   Also with follow up on hip pain. Had injection in R greater trochanter 1 month ago for ongoing pain and trochanteric bursitis on exam. Since that time, her right hip has felt much better. She is hoping to repeat this in her L hip now to get hte same relief.     Objective   Vital Signs:     /70   Pulse 74   Temp 96.6 °F (35.9 °C)   Resp 16   Ht 170.2 cm (67\")   Wt 89.4 kg (197 lb)   SpO2 100%   BMI 30.85 kg/m²       Physical Exam  Vitals and nursing note reviewed.   Constitutional:       General: She is not in acute distress.     Appearance: Normal appearance.   Cardiovascular:      Rate and Rhythm: Normal rate and regular rhythm.      Pulses: Normal pulses.      Heart sounds: Normal heart sounds. No murmur heard.  Pulmonary:      Effort: Pulmonary effort is normal. No respiratory distress.      Breath sounds: Normal breath sounds.   Abdominal:      General: Abdomen is flat. Bowel sounds are normal.      Palpations: Abdomen is soft.      Tenderness: There is no abdominal tenderness.   Musculoskeletal:      Right lower leg: No edema.      Left lower leg: No edema.      Comments: L hip with significant TTP over L greater trochanter and over IT band   Neurological:      Mental Status: She is alert and oriented to person, place, and time. Mental status is at baseline.   Psychiatric:         Mood and Affect: Mood normal.         " Behavior: Behavior normal.          Result Review :   The following data was reviewed by: Génesis Azul MD on 04/26/2022:  CMP    CMP 7/28/21   Glucose 72   BUN 10   Creatinine 0.75   eGFR Non  Am 86   eGFR African Am 104   Sodium 139   Potassium 4.7   Chloride 105   Calcium 9.2   Total Protein 6.1   Albumin 3.90   Globulin 2.2   Total Bilirubin 0.3   Alkaline Phosphatase 66   AST (SGOT) 16   ALT (SGPT) 13      Comments are available for some flowsheets but are not being displayed.           CBC w/diff    CBC w/Diff 6/10/21 7/28/21 4/21/22   WBC 10.48 9.62 6.71   RBC 4.20 4.55 4.65   Hemoglobin 12.0 13.3 13.0   Hematocrit 38.8 40.2 41.3   MCV 92.4 88.4 88.8   MCH 28.6 29.2 28.0   MCHC 30.9 (A) 33.1 31.5   RDW 14.5 14.5 15.4   Platelets 328 356 351   Neutrophil Rel % 79.2 59.9 53.8   Immature Granulocyte Rel % 0.4  0.3   Lymphocyte Rel % 15.4 23.9 34.6   Monocyte Rel % 4.2 10.5 8.2   Eosinophil Rel % 0.4 4.9 2.8   Basophil Rel % 0.4 0.5 0.3   (A) Abnormal value            Data reviewed: Radiologic studies CT abd/pelvis and Recent hospitalization notes ER visit 4/21 for abd pain            Assessment and Plan      Diagnoses and all orders for this visit:    1. Trochanteric bursitis of both hips (Primary)  Arthrocentesis    Date/Time: 4/26/2022 12:10 PM  Performed by: Génesis Azul MD  Authorized by: Génesis Azul MD   Indications: pain   Body area: hip  Joint: left hip  Local anesthesia used: yes    Anesthesia:  Local anesthesia used: yes  Local Anesthetic: lidocaine 2% without epinephrine  Anesthetic total: 2 mL    Sedation:  Patient sedated: no    Preparation: Patient was prepped and draped in the usual sterile fashion.  Needle gauge: 23G.  Ultrasound guidance: no  Approach: lateral  Patient tolerance: patient tolerated the procedure well with no immediate complications        2. Colon cancer screening  3. Family history of colonic polyps   - pt recently went to ER for abd pain, now resolved  but with intermittent nausea   - recently found out sister with early stage colon CA and 10+ polyps on cscope-> will refer for early screening related to family history     Orders:  -     Ambulatory Referral For Screening Colonoscopy    Follow Up   Return in about 5 months (around 9/26/2022) for follow up.    Patient was given instructions and counseling regarding her condition or for health maintenance advice. Please see specific information pulled into the AVS if appropriate.     Lore Azul MD  [facility] [place]  Phone: [phone]  Fax: [phone]

## 2022-05-05 ENCOUNTER — TELEPHONE (OUTPATIENT)
Dept: INTERNAL MEDICINE | Facility: CLINIC | Age: 42
End: 2022-05-05

## 2022-05-05 RX ORDER — ATOMOXETINE 25 MG/1
25 CAPSULE ORAL DAILY
Qty: 30 CAPSULE | Refills: 1 | Status: SHIPPED | OUTPATIENT
Start: 2022-05-05 | End: 2022-05-31 | Stop reason: SDUPTHER

## 2022-05-05 NOTE — TELEPHONE ENCOUNTER
I S/W THE PATIENT AND RELAYED DR. DE SANTIAGO'S MESSAGE.  I ALSO TRANSFERRED HER TO SCHEDULING TO MAKE AN APPT.

## 2022-05-05 NOTE — TELEPHONE ENCOUNTER
Ill send in low dose since she has had side effects with it in the past, unlikely to be super effective at this low dose but hoping if we start slow and titrate systematically we can limit the behzad for side effect development  Needs to follow up in 1 matheus after starting this dose so we can go from there

## 2022-05-05 NOTE — TELEPHONE ENCOUNTER
Caller: Yadi Syed    Relationship: Self    Best call back number:[phone]    What medication are you requesting: STRATTERA    What are your current symptoms: ADHD    Have you had these symptoms before:    [x] Yes  [] No    Have you been treated for these symptoms before:   [x] Yes  [] No    If a prescription is needed, what is your preferred pharmacy and phone number: [facility] [address] [facility] [address] [facility] - [phone]  - [phone] FX     Additional notes:PATIENT STATES SHE WOULD LIKE TO TRY AGAIN. PLEASE ADVISE.

## 2022-05-25 ENCOUNTER — TELEPHONE (OUTPATIENT)
Dept: INTERNAL MEDICINE | Facility: CLINIC | Age: 42
End: 2022-05-25

## 2022-05-25 DIAGNOSIS — R11.0 NAUSEA: ICD-10-CM

## 2022-05-25 RX ORDER — ONDANSETRON 4 MG/1
TABLET, ORALLY DISINTEGRATING ORAL
Qty: 8 TABLET | Refills: 0 | Status: SHIPPED | OUTPATIENT
Start: 2022-05-25

## 2022-05-25 NOTE — TELEPHONE ENCOUNTER
Caller: DANIELA QUINONES    Relationship: Emergency Contact    Best call back number: [phone]    What medication are you requesting: PHENAGREN    What are your current symptoms: DIARRHEA, DIZZINESS, NAUSEA, FEVER, VOMITING. THIS IS DAY 3.      Have you had these symptoms before:    [] Yes  [x] No    Have you been treated for these symptoms before:   [] Yes  [x] No    If a prescription is needed, what is your preferred pharmacy and phone number: [facility] [address] [place] [address] [place] - [phone]  - [phone] FX     Additional notes: PATIENT'S , DANIELA CALLING STATING THAT THE PATIENT IS REQUESTING PHENAGREN, DIARRHEA, DIZZINESS, FEVER, VOMITING, NAUSEA.

## 2022-05-25 NOTE — TELEPHONE ENCOUNTER
I got a refill request for zofran, so is she needing zofran or phenergan? Has she tested herself for COVID? Anyone else sick at home to make her think this is infectious?

## 2022-05-26 RX ORDER — PROMETHAZINE HYDROCHLORIDE 25 MG/1
25 TABLET ORAL EVERY 6 HOURS PRN
Qty: 20 TABLET | Refills: 0 | Status: SHIPPED | OUTPATIENT
Start: 2022-05-26

## 2022-05-26 NOTE — TELEPHONE ENCOUNTER
I have sent Rx for phenergan but that will help with nausea and vomiting not diarrhea. If her diarrhea continues, luis if no one else is sick, we may need to try to figure out why june tis happening

## 2022-05-26 NOTE — TELEPHONE ENCOUNTER
I S/W THE PATIENT'S .  THE ZOFRAN IS NOT HELPING LISBET.  SHE WOULD LIKE PHENKAINAN SENT IN, PLEASE.  SHE SAID THAT SHE TESTED HERSELF FOR COVID AND IT WAS NEGATIVE.  NO ONE ELSE IS SICK IN THE HOUSE.    THANK YOU

## 2022-05-31 ENCOUNTER — OFFICE VISIT (OUTPATIENT)
Dept: INTERNAL MEDICINE | Facility: CLINIC | Age: 42
End: 2022-05-31

## 2022-05-31 VITALS
HEIGHT: 67 IN | TEMPERATURE: 96.8 F | SYSTOLIC BLOOD PRESSURE: 110 MMHG | OXYGEN SATURATION: 98 % | WEIGHT: 192 LBS | BODY MASS INDEX: 30.13 KG/M2 | HEART RATE: 91 BPM | DIASTOLIC BLOOD PRESSURE: 80 MMHG | RESPIRATION RATE: 16 BRPM

## 2022-05-31 DIAGNOSIS — F32.A ANXIETY AND DEPRESSION: ICD-10-CM

## 2022-05-31 DIAGNOSIS — F41.9 ANXIETY AND DEPRESSION: ICD-10-CM

## 2022-05-31 DIAGNOSIS — M70.61 TROCHANTERIC BURSITIS OF BOTH HIPS: Chronic | ICD-10-CM

## 2022-05-31 DIAGNOSIS — M70.62 TROCHANTERIC BURSITIS OF BOTH HIPS: Chronic | ICD-10-CM

## 2022-05-31 DIAGNOSIS — R41.840 ATTENTION AND CONCENTRATION DEFICIT: Primary | ICD-10-CM

## 2022-05-31 PROCEDURE — 99214 OFFICE O/P EST MOD 30 MIN: CPT | Performed by: INTERNAL MEDICINE

## 2022-05-31 RX ORDER — ATOMOXETINE 40 MG/1
40 CAPSULE ORAL DAILY
Qty: 30 CAPSULE | Refills: 2 | Status: SHIPPED | OUTPATIENT
Start: 2022-05-31 | End: 2023-01-05

## 2022-06-01 NOTE — ASSESSMENT & PLAN NOTE
UNCONTROLLED  - has tried numerous stimulant medications in the past but was not able to tolerate  - doing well on strattera as far as tolerance goes, will attempt to increase dose slightly to 40 mg for improved efficacy  - pt to reach out if unable to tolerate or if she would like to try a further increased dose

## 2022-06-01 NOTE — ASSESSMENT & PLAN NOTE
CONTROLLED  - cont cymbalta at current doses--> refills sent today  - Reviewed potential side effects of medication including sexual performance changes, insomnia, fatigue, weight changes. Pt tolerating well without any issues.   - recently also on low dose wellbutrin 150 mg (unable to tolerate 300 mg dose) but stopped when strattera started as it was initiated to try to help ADHD symptoms when she was unable to tolerate stimulant meds

## 2022-06-25 ENCOUNTER — DOCUMENTATION (OUTPATIENT)
Dept: INTERNAL MEDICINE | Facility: CLINIC | Age: 42
End: 2022-06-25

## 2022-06-25 DIAGNOSIS — D22.9 ATYPICAL MOLE: Primary | ICD-10-CM

## 2022-06-25 DIAGNOSIS — N64.4 BREAST PAIN, RIGHT: ICD-10-CM

## 2022-07-05 DIAGNOSIS — J30.2 SEASONAL ALLERGIES: ICD-10-CM

## 2022-07-07 RX ORDER — LEVOCETIRIZINE DIHYDROCHLORIDE 5 MG/1
TABLET, FILM COATED ORAL
Qty: 90 TABLET | Refills: 3 | Status: SHIPPED | OUTPATIENT
Start: 2022-07-07

## 2022-07-07 NOTE — TELEPHONE ENCOUNTER
Rx Refill Note  Requested Prescriptions     Pending Prescriptions Disp Refills   • levocetirizine (XYZAL) 5 MG tablet [Pharmacy Med Name: Levocetirizine Dihydrochloride 5 MG Oral Tablet] 30 tablet 0     Sig: TAKE 1 TABLET BY MOUTH ONCE DAILY IN THE EVENING      Last office visit with prescribing clinician: 9/13/2021      Next office visit with prescribing clinician: Visit date not found            Aleena Santiago MA  07/07/22, 08:01 EDT

## 2022-08-30 ENCOUNTER — TELEPHONE (OUTPATIENT)
Dept: INTERNAL MEDICINE | Facility: CLINIC | Age: 42
End: 2022-08-30

## 2022-08-30 DIAGNOSIS — G62.9 POLYNEUROPATHY, UNSPECIFIED: ICD-10-CM

## 2022-08-30 RX ORDER — GABAPENTIN 300 MG/1
CAPSULE ORAL
Qty: 180 CAPSULE | Refills: 0 | Status: SHIPPED | OUTPATIENT
Start: 2022-08-30 | End: 2022-11-02

## 2022-08-30 NOTE — TELEPHONE ENCOUNTER
Rx Refill Note  Requested Prescriptions     Pending Prescriptions Disp Refills   • gabapentin (NEURONTIN) 300 MG capsule [Pharmacy Med Name: Gabapentin 300 MG Oral Capsule] 180 capsule 0     Sig: TAKE 2 CAPSULES BY MOUTH THREE TIMES DAILY      Last office visit with prescribing clinician: 5/31/2022      Next office visit with prescribing clinician: 9/12/2022            Aleena Santiago MA  08/30/22, 15:36 EDT

## 2022-08-30 NOTE — TELEPHONE ENCOUNTER
Caller: Yadi Syed    Relationship: Self    Best call back number: [phone]    What form or medical record are you requesting: MOST RECENT LAB RESULTS    Who is requesting this form or medical record from you: SELF, ANOTHER PROVIDER    How would you like to receive the form or medical records (pick-up, mail, fax):   If pick-up, provide patient with address and location details    Timeframe paperwork needed: ASAP    Additional notes: PLEASE CALL TO ADVISE WHEN READY TO

## 2022-09-12 ENCOUNTER — OFFICE VISIT (OUTPATIENT)
Dept: INTERNAL MEDICINE | Facility: CLINIC | Age: 42
End: 2022-09-12

## 2022-09-12 VITALS
RESPIRATION RATE: 16 BRPM | HEART RATE: 80 BPM | DIASTOLIC BLOOD PRESSURE: 76 MMHG | SYSTOLIC BLOOD PRESSURE: 120 MMHG | WEIGHT: 192 LBS | OXYGEN SATURATION: 99 % | BODY MASS INDEX: 30.13 KG/M2 | HEIGHT: 67 IN | TEMPERATURE: 98.4 F

## 2022-09-12 DIAGNOSIS — M70.61 TROCHANTERIC BURSITIS OF BOTH HIPS: Chronic | ICD-10-CM

## 2022-09-12 DIAGNOSIS — R41.840 ATTENTION AND CONCENTRATION DEFICIT: ICD-10-CM

## 2022-09-12 DIAGNOSIS — F32.A ANXIETY AND DEPRESSION: Primary | ICD-10-CM

## 2022-09-12 DIAGNOSIS — L60.0 INGROWN TOENAIL OF RIGHT FOOT: ICD-10-CM

## 2022-09-12 DIAGNOSIS — M70.62 TROCHANTERIC BURSITIS OF BOTH HIPS: Chronic | ICD-10-CM

## 2022-09-12 DIAGNOSIS — F41.9 ANXIETY AND DEPRESSION: Primary | ICD-10-CM

## 2022-09-12 PROCEDURE — 20610 DRAIN/INJ JOINT/BURSA W/O US: CPT | Performed by: INTERNAL MEDICINE

## 2022-09-12 PROCEDURE — 99214 OFFICE O/P EST MOD 30 MIN: CPT | Performed by: INTERNAL MEDICINE

## 2022-09-12 RX ORDER — TRIAMCINOLONE ACETONIDE 40 MG/ML
20 INJECTION, SUSPENSION INTRA-ARTICULAR; INTRAMUSCULAR
Status: COMPLETED | OUTPATIENT
Start: 2022-09-12 | End: 2022-09-12

## 2022-09-12 RX ORDER — TRIAMCINOLONE ACETONIDE 40 MG/ML
40 INJECTION, SUSPENSION INTRA-ARTICULAR; INTRAMUSCULAR
Status: COMPLETED | OUTPATIENT
Start: 2022-09-12 | End: 2022-09-12

## 2022-09-12 RX ADMIN — TRIAMCINOLONE ACETONIDE 40 MG: 40 INJECTION, SUSPENSION INTRA-ARTICULAR; INTRAMUSCULAR at 12:48

## 2022-09-12 RX ADMIN — TRIAMCINOLONE ACETONIDE 40 MG: 40 INJECTION, SUSPENSION INTRA-ARTICULAR; INTRAMUSCULAR at 12:47

## 2022-09-12 RX ADMIN — TRIAMCINOLONE ACETONIDE 20 MG: 40 INJECTION, SUSPENSION INTRA-ARTICULAR; INTRAMUSCULAR at 12:47

## 2022-09-12 RX ADMIN — TRIAMCINOLONE ACETONIDE 20 MG: 40 INJECTION, SUSPENSION INTRA-ARTICULAR; INTRAMUSCULAR at 12:48

## 2022-09-12 NOTE — ASSESSMENT & PLAN NOTE
UNCONTROLLED  - cont cymbalta at current doses--> refills sent today  - Reviewed potential side effects of medication including sexual performance changes, insomnia, fatigue, weight changes. Pt tolerating well without any issues.   - pt notes more spending behaviors and risk taking behaviors but does not want to address this yet, will cont to monitor her behavior and will let me know if things progress and need to be addressed

## 2022-09-12 NOTE — ASSESSMENT & PLAN NOTE
UNCONTROLLED  - has tried numerous stimulant medications in the past but was not able to tolerate  - tried strattera but did not like the way this made her feel either  - could consider nighttime clonidine or guanfacine if she is interested in trying to treat again in the future

## 2022-09-12 NOTE — PROGRESS NOTES
"Chief Complaint  Follow-up, Anxiety, Depression, and Hip Pain    Subjective          Yadi Syed presents to [facility] for follow up and med refills. Pt taking all medications daily as prescribed with good reported compliance. No issues or side effects with meds.   Having issues with her daughter causing her more stress. Has stopped the strattera because she didn't like the way it made her feel. Just managing on cymbalta fo now.   Hips starting hurting her again over past months.       Objective   Vital Signs:     /76   Pulse 80   Temp 98.4 °F (36.9 °C)   Resp 16   Ht 170.2 cm (67\")   Wt 87.1 kg (192 lb)   SpO2 99%   BMI 30.07 kg/m²       Physical Exam  Vitals and nursing note reviewed.   Constitutional:       General: She is not in acute distress.     Appearance: Normal appearance.   Cardiovascular:      Rate and Rhythm: Normal rate and regular rhythm.      Pulses: Normal pulses.      Heart sounds: Normal heart sounds. No murmur heard.  Pulmonary:      Effort: Pulmonary effort is normal. No respiratory distress.      Breath sounds: Normal breath sounds.   Abdominal:      General: Abdomen is flat. Bowel sounds are normal.      Palpations: Abdomen is soft.      Tenderness: There is no abdominal tenderness.   Musculoskeletal:      Right lower leg: No edema.      Left lower leg: No edema.        Feet:       Comments: TTP over bilateral trochanteric bursa and extending distally down IT band   Feet:      Right foot:      Toenail Condition: Right toenails are ingrown. Fungal disease present.  Neurological:      Mental Status: She is alert and oriented to person, place, and time. Mental status is at baseline.   Psychiatric:         Mood and Affect: Mood normal.         Behavior: Behavior normal.          Result Review :   The following data was reviewed by: Génesis Azul MD on 09/12/2022:      CBC w/diff    CBC w/Diff 4/21/22   WBC 6.71   RBC 4.65 "   Hemoglobin 13.0   Hematocrit 41.3   MCV 88.8   MCH 28.0   MCHC 31.5   RDW 15.4   Platelets 351   Neutrophil Rel % 53.8   Immature Granulocyte Rel % 0.3   Lymphocyte Rel % 34.6   Monocyte Rel % 8.2   Eosinophil Rel % 2.8   Basophil Rel % 0.3              Assessment and Plan      Diagnoses and all orders for this visit:    1. Anxiety and depression (Primary)  Assessment & Plan:  UNCONTROLLED  - cont cymbalta at current doses--> refills sent today  - Reviewed potential side effects of medication including sexual performance changes, insomnia, fatigue, weight changes. Pt tolerating well without any issues.   - pt notes more spending behaviors and risk taking behaviors but does not want to address this yet, will cont to monitor her behavior and will let me know if things progress and need to be addressed        2. Attention and concentration deficit  Assessment & Plan:  UNCONTROLLED  - has tried numerous stimulant medications in the past but was not able to tolerate  - tried strattera but did not like the way this made her feel either  - could consider nighttime clonidine or guanfacine if she is interested in trying to treat again in the future      3. Ingrown toenail of right foot  -     Ambulatory Referral to Podiatry    4. Trochanteric bursitis of both hips    Other orders  -     Arthrocentesis  -     Arthrocentesis  Arthrocentesis    Date/Time: 9/12/2022 12:47 PM  Performed by: Génesis Azul MD  Authorized by: Génesis Azul MD   Indications: pain   Body area: hip  Joint: right hip  Local anesthesia used: yes    Anesthesia:  Local anesthesia used: yes  Local Anesthetic: lidocaine 2% without epinephrine  Anesthetic total: 2 mL    Sedation:  Patient sedated: no    Preparation: Patient was prepped and draped in the usual sterile fashion.  Needle gauge: 23G.  Ultrasound guidance: no  Approach: lateral  Aspirate amount: 0 mL  Meds administered: 40 mg triamcinolone acetonide 40 MG/ML; 20 mg triamcinolone  acetonide 40 MG/ML  Patient tolerance: patient tolerated the procedure well with no immediate complications  Comments: Indication: trochanteric bursitis    Arthrocentesis    Date/Time: 9/12/2022 12:48 PM  Performed by: Génesis Azul MD  Authorized by: Génesis Azul MD   Indications: pain   Body area: hip  Joint: left hip  Local anesthesia used: yes    Anesthesia:  Local anesthesia used: yes  Local Anesthetic: lidocaine 2% without epinephrine  Anesthetic total: 2 mL    Sedation:  Patient sedated: no    Preparation: Patient was prepped and draped in the usual sterile fashion.  Needle gauge: 23G.  Ultrasound guidance: no  Approach: lateral  Aspirate amount: 0 mL  Meds administered: 20 mg triamcinolone acetonide 40 MG/ML; 40 mg triamcinolone acetonide 40 MG/ML  Patient tolerance: patient tolerated the procedure well with no immediate complications  Comments: Indication: trochanteric bursitis              Follow Up   Return in about 3 months (around 12/12/2022) for Annual physical.    Patient was given instructions and counseling regarding her condition or for health maintenance advice. Please see specific information pulled into the AVS if appropriate.     Lore Azul MD  [facility] [place] [facility]  Phone: [phone]  Fax: [phone]

## 2022-11-02 DIAGNOSIS — G62.9 POLYNEUROPATHY, UNSPECIFIED: ICD-10-CM

## 2022-11-02 DIAGNOSIS — F32.A ANXIETY AND DEPRESSION: ICD-10-CM

## 2022-11-02 DIAGNOSIS — F41.9 ANXIETY AND DEPRESSION: ICD-10-CM

## 2022-11-02 RX ORDER — GABAPENTIN 300 MG/1
CAPSULE ORAL
Qty: 180 CAPSULE | Refills: 1 | Status: SHIPPED | OUTPATIENT
Start: 2022-11-02 | End: 2023-02-14 | Stop reason: SDUPTHER

## 2022-11-02 RX ORDER — DULOXETIN HYDROCHLORIDE 30 MG/1
CAPSULE, DELAYED RELEASE ORAL
Qty: 90 CAPSULE | Refills: 1 | Status: SHIPPED | OUTPATIENT
Start: 2022-11-02 | End: 2023-02-14 | Stop reason: SDUPTHER

## 2022-11-02 RX ORDER — BUPROPION HYDROCHLORIDE 150 MG/1
TABLET ORAL
Qty: 90 TABLET | Refills: 1 | Status: SHIPPED | OUTPATIENT
Start: 2022-11-02 | End: 2023-02-14 | Stop reason: SDUPTHER

## 2023-01-03 ENCOUNTER — TELEPHONE (OUTPATIENT)
Dept: GASTROENTEROLOGY | Facility: CLINIC | Age: 43
End: 2023-01-03
Payer: COMMERCIAL

## 2023-01-03 ENCOUNTER — TELEPHONE (OUTPATIENT)
Dept: INTERNAL MEDICINE | Facility: CLINIC | Age: 43
End: 2023-01-03

## 2023-01-03 NOTE — TELEPHONE ENCOUNTER
Caller: Yadi Syed    Relationship to patient: Self    Best call back number: [phone]    Chief complaint: HANDS AND FEET SWELLING, ALL AROUND NOT FEELING GOOD     Type of visit: OFFICE VISIT    Requested date: TOMORROW LATE AFTERNOON OR EARLY Thursday MORNING     Additional notes: PATIENT STATES SHE WOULD LIKE TO SEE DR. KEEN DUE TO DR. DE SANTIAGO NOT HAVING ANYTHING AVAILABLE UNTIL Monday, 01/09/2023.     HUB ATTEMPTED TO WARM TRANSFER BUT WAS UNSUCCESSFUL .

## 2023-01-05 ENCOUNTER — OFFICE VISIT (OUTPATIENT)
Dept: INTERNAL MEDICINE | Facility: CLINIC | Age: 43
End: 2023-01-05
Payer: COMMERCIAL

## 2023-01-05 VITALS
DIASTOLIC BLOOD PRESSURE: 80 MMHG | OXYGEN SATURATION: 97 % | HEART RATE: 74 BPM | TEMPERATURE: 98 F | BODY MASS INDEX: 32.49 KG/M2 | RESPIRATION RATE: 18 BRPM | WEIGHT: 207 LBS | HEIGHT: 67 IN | SYSTOLIC BLOOD PRESSURE: 126 MMHG

## 2023-01-05 DIAGNOSIS — T14.8XXA BRUISING: Primary | ICD-10-CM

## 2023-01-05 PROCEDURE — 99214 OFFICE O/P EST MOD 30 MIN: CPT | Performed by: INTERNAL MEDICINE

## 2023-01-05 RX ORDER — TERBINAFINE HYDROCHLORIDE 250 MG/1
250 TABLET ORAL DAILY
COMMUNITY
Start: 2022-12-09

## 2023-01-05 NOTE — PROGRESS NOTES
Chief Complaint  easy bruising, Palpitations, and Weight Gain    Subjective        Yadi Syed presents to [facility]  History of Present Illness  Pt is here for acute visit, new-onset of bruising and palpitations. States that 3 days before Bob she initially noticed left posteromedial leg bruising. Since then she has noticed additional bruising to the right lateral thigh and right arm. Denies any trauma or physical abuse. Associated with this she has had ongoing, intermittent palpitations for the past month occurring about once a week. She has also gained around 20 pounds in the past 3 months. At this time she has had lightheadedness every other day when getting up from standing and other times when already standing. Only change to medications is starting terbinafine for foot fungus by Podiatrist around 2 months ago. Has had mild orthopnea not requiring pillows to sleep.       Objective   Vital Signs:  /80   Pulse 74   Temp 98 °F (36.7 °C)   Resp 18   Ht 170.2 cm (67\")   Wt 93.9 kg (207 lb)   SpO2 97%   BMI 32.42 kg/m²   Estimated body mass index is 32.42 kg/m² as calculated from the following:    Height as of this encounter: 170.2 cm (67\").    Weight as of this encounter: 93.9 kg (207 lb).          Physical Exam  Vitals and nursing note reviewed.   Constitutional:       General: She is not in acute distress.     Appearance: Normal appearance. She is obese. She is not ill-appearing.   HENT:      Head: Normocephalic and atraumatic.      Right Ear: External ear normal.      Left Ear: External ear normal.      Nose: Nose normal. No congestion.      Mouth/Throat:      Mouth: Mucous membranes are moist.      Pharynx: Oropharynx is clear.   Eyes:      Extraocular Movements: Extraocular movements intact.      Conjunctiva/sclera: Conjunctivae normal.      Pupils: Pupils are equal, round, and reactive to light.   Cardiovascular:      Rate and Rhythm:  Normal rate and regular rhythm.      Pulses: Normal pulses.      Heart sounds: Normal heart sounds. No murmur heard.    No friction rub. No gallop.   Pulmonary:      Effort: Pulmonary effort is normal.      Breath sounds: Normal breath sounds. No wheezing, rhonchi or rales.   Abdominal:      General: Abdomen is flat. There is no distension.      Palpations: Abdomen is soft. There is no mass.      Tenderness: There is no abdominal tenderness. There is no guarding or rebound.   Musculoskeletal:         General: No tenderness. Normal range of motion.      Cervical back: Normal range of motion and neck supple. No tenderness.      Right lower leg: No edema.      Left lower leg: No edema.   Skin:     General: Skin is warm and dry.      Capillary Refill: Capillary refill takes less than 2 seconds.      Findings: Bruising (Circular bruising to the right lateral thigh and right forearm. ) present. No rash.   Neurological:      General: No focal deficit present.      Mental Status: She is alert and oriented to person, place, and time. Mental status is at baseline.   Psychiatric:         Mood and Affect: Mood normal.         Behavior: Behavior normal.        Result Review :  The following data was reviewed by: Nikko Ortega, Medical Student on 01/05/2023:  Common labs    Common Labs 4/21/22   WBC 6.71   Hemoglobin 13.0   Hematocrit 41.3   Platelets 351               Assessment and Plan   Diagnoses and all orders for this visit:    1. Bruising (Primary)  -     CBC w AUTO Differential  -     Comprehensive metabolic panel  -     Protime-INR  -     TSH    - DDx for bruising is broad and tough to narrow down to a single diagnosis which would explain her additional associated symptoms. Could be liver injury or PALUMBO manifesting as platelet dysfunction versus medication side-effect from terbinafine versus non-pathologic/natural bruising  - In-office EKG normal sinus rhythm without St or T-wave changes  - Will obtain CBC, CMP, PT/INR,  and TSH to further evaluate         Follow Up   No follow-ups on file.  Patient was given instructions and counseling regarding her condition or for health maintenance advice. Please see specific information pulled into the AVS if appropriate.

## 2023-02-07 ENCOUNTER — TELEPHONE (OUTPATIENT)
Dept: INTERNAL MEDICINE | Facility: CLINIC | Age: 43
End: 2023-02-07
Payer: COMMERCIAL

## 2023-02-14 ENCOUNTER — OFFICE VISIT (OUTPATIENT)
Dept: INTERNAL MEDICINE | Facility: CLINIC | Age: 43
End: 2023-02-14
Payer: COMMERCIAL

## 2023-02-14 VITALS
HEIGHT: 67 IN | DIASTOLIC BLOOD PRESSURE: 76 MMHG | HEART RATE: 87 BPM | BODY MASS INDEX: 30.92 KG/M2 | WEIGHT: 197 LBS | RESPIRATION RATE: 16 BRPM | SYSTOLIC BLOOD PRESSURE: 124 MMHG | TEMPERATURE: 97 F | OXYGEN SATURATION: 100 %

## 2023-02-14 DIAGNOSIS — R21 RASH: ICD-10-CM

## 2023-02-14 DIAGNOSIS — F41.9 ANXIETY AND DEPRESSION: Primary | ICD-10-CM

## 2023-02-14 DIAGNOSIS — F32.A ANXIETY AND DEPRESSION: Primary | ICD-10-CM

## 2023-02-14 DIAGNOSIS — M51.16 LUMBAR DISC HERNIATION WITH RADICULOPATHY: ICD-10-CM

## 2023-02-14 PROCEDURE — 99214 OFFICE O/P EST MOD 30 MIN: CPT | Performed by: INTERNAL MEDICINE

## 2023-02-14 RX ORDER — DULOXETIN HYDROCHLORIDE 30 MG/1
30 CAPSULE, DELAYED RELEASE ORAL DAILY
Qty: 90 CAPSULE | Refills: 1 | Status: SHIPPED | OUTPATIENT
Start: 2023-02-14

## 2023-02-14 RX ORDER — GABAPENTIN 300 MG/1
600 CAPSULE ORAL 3 TIMES DAILY
Qty: 180 CAPSULE | Refills: 2 | Status: SHIPPED | OUTPATIENT
Start: 2023-02-14

## 2023-02-14 RX ORDER — DICLOFENAC SODIUM AND MISOPROSTOL 50; 200 MG/1; UG/1
1 TABLET, DELAYED RELEASE ORAL 3 TIMES DAILY PRN
Qty: 90 TABLET | Refills: 1 | Status: SHIPPED | OUTPATIENT
Start: 2023-02-14

## 2023-02-14 RX ORDER — BUPROPION HYDROCHLORIDE 150 MG/1
150 TABLET ORAL DAILY
Qty: 90 TABLET | Refills: 1 | Status: SHIPPED | OUTPATIENT
Start: 2023-02-14

## 2023-02-14 RX ORDER — TRIAMCINOLONE ACETONIDE 0.25 MG/G
1 OINTMENT TOPICAL 2 TIMES DAILY
Qty: 80 G | Refills: 1 | Status: SHIPPED | OUTPATIENT
Start: 2023-02-14

## 2023-02-14 NOTE — PROGRESS NOTES
"Chief Complaint  Follow-up, Anxiety, and Depression    Subjective          Yadi Syed presents to [facility] for follow up and med refills. Pt taking all medications daily as prescribed with good reported compliance. No issues or side effects with meds.   Pt took herself off all medications about 1 month ago to see how she did from an anxiety standpoint, noticed a flare almost immediately. Is now back on both the cymbalta and the wellbutrin again and is feeling better.   Also tried to stop the gabapentin but pain level flared and has restarted this as well.     Objective   Vital Signs:     /76   Pulse 87   Temp 97 °F (36.1 °C)   Resp 16   Ht 170.2 cm (67\")   Wt 89.4 kg (197 lb)   SpO2 100%   BMI 30.85 kg/m²     Physical Exam  Vitals and nursing note reviewed.   Constitutional:       General: She is not in acute distress.     Appearance: Normal appearance.   Cardiovascular:      Rate and Rhythm: Normal rate and regular rhythm.      Pulses: Normal pulses.      Heart sounds: Normal heart sounds. No murmur heard.  Pulmonary:      Effort: Pulmonary effort is normal. No respiratory distress.      Breath sounds: Normal breath sounds.   Abdominal:      General: Abdomen is flat. Bowel sounds are normal.      Palpations: Abdomen is soft.      Tenderness: There is no abdominal tenderness.   Musculoskeletal:      Right lower leg: No edema.      Left lower leg: No edema.   Skin:     Comments: Volar surface of R wrist with erythematous papules on erythematous base in clustered distribution, some lesions appear to maybe been vesicular in nature and already ruptured   Neurological:      Mental Status: She is alert and oriented to person, place, and time. Mental status is at baseline.   Psychiatric:         Mood and Affect: Mood normal.         Behavior: Behavior normal.          Result Review :   The following data was reviewed by: Génesis Azul MD on " 02/14/2023:      CBC w/diff    CBC w/Diff 4/21/22   WBC 6.71   RBC 4.65   Hemoglobin 13.0   Hematocrit 41.3   MCV 88.8   MCH 28.0   MCHC 31.5   RDW 15.4   Platelets 351   Neutrophil Rel % 53.8   Immature Granulocyte Rel % 0.3   Lymphocyte Rel % 34.6   Monocyte Rel % 8.2   Eosinophil Rel % 2.8   Basophil Rel % 0.3              Assessment and Plan      Diagnoses and all orders for this visit:    1. Anxiety and depression (Primary)  Assessment & Plan:  IMPROVED  - cont cymbalta at current doses--> refills sent today (did not tolerate higher doses)  - cont on current wellbutrin 150 mg daily--> refills sent  - Reviewed potential side effects of medication including sexual performance changes, insomnia, fatigue, weight changes. Pt tolerating well without any issues.   - tried to stop medications 01/23 but felt a flare of her symptoms      Orders:  -     buPROPion XL (WELLBUTRIN XL) 150 MG 24 hr tablet; Take 1 tablet by mouth Daily.  Dispense: 90 tablet; Refill: 1  -     DULoxetine (CYMBALTA) 30 MG capsule; Take 1 capsule by mouth Daily.  Dispense: 90 capsule; Refill: 1    2. Lumbar disc herniation with radiculopathy  Assessment & Plan:  CONTROLLED  - will restart prn use of arthrotec, Rx sent, have to be very careful regarding use of NSAIDs due to PUD history  - cont gabapentin use, Rx for 600 mg TID but has been doing better lately and will often be able to just take twice daily  - Reviewed controlled nature of substance, potential for abuse/addiction, and possible adverse effects of medication. No red flags for abuse at this time.   - Kwadwo checked and appropriate.     Orders:  -     diclofenac-miSOPROStol (ARTHROTEC 50) 50-0.2 MG EC tablet; Take 1 tablet by mouth 3 (Three) Times a Day As Needed (pain). for pain  Dispense: 90 tablet; Refill: 1  -     gabapentin (NEURONTIN) 300 MG capsule; Take 2 capsules by mouth 3 (Three) Times a Day.  Dispense: 180 capsule; Refill: 2    3. Rash  -     triamcinolone (KENALOG) 0.025 %  ointment; Apply 1 application topically to the appropriate area as directed 2 (Two) Times a Day.  Dispense: 80 g; Refill: 1      Follow Up   Return in about 6 months (around 8/14/2023) for Annual physical.    Patient was given instructions and counseling regarding her condition or for health maintenance advice. Please see specific information pulled into the AVS if appropriate.     Lore Azul MD  [facility] [place] [facility]  Phone: [phone]  Fax: [phone]

## 2023-02-14 NOTE — ASSESSMENT & PLAN NOTE
CONTROLLED  - will restart prn use of arthrotec, Rx sent, have to be very careful regarding use of NSAIDs due to PUD history  - cont gabapentin use, Rx for 600 mg TID but has been doing better lately and will often be able to just take twice daily  - Reviewed controlled nature of substance, potential for abuse/addiction, and possible adverse effects of medication. No red flags for abuse at this time.   - Kwadwo checked and appropriate.

## 2023-02-14 NOTE — ASSESSMENT & PLAN NOTE
IMPROVED  - cont cymbalta at current doses--> refills sent today (did not tolerate higher doses)  - cont on current wellbutrin 150 mg daily--> refills sent  - Reviewed potential side effects of medication including sexual performance changes, insomnia, fatigue, weight changes. Pt tolerating well without any issues.   - tried to stop medications 01/23 but felt a flare of her symptoms     lov 2/16/2017

## 2023-05-02 NOTE — PROGRESS NOTES
"Chief Complaint  Med Refill, Anxiety, and Depression    Subjective          aYdi Syed presents to [facility] for follow up and med refills. Pt taking all medications daily as prescribed with good reported compliance. No issues or side effects with meds.   Mood wise feels like things are going ok, meds are working to control her symptoms. Very tearful if she forgets her cymbalta, more forgetful if she forgets her wellbutrin. Has tried higher doses of each but developed side effects.   Taking gabapentin prn for pain related to cervical radiculopathy, notes she feels like it helps her mood overall.   Has tried non-stimulants for ADHD in the past without much success.     Objective   Vital Signs:     /86   Pulse 72   Temp 96.9 °F (36.1 °C)   Resp 16   Ht 167.6 cm (66\")   Wt 87.5 kg (193 lb)   SpO2 99%   BMI 31.15 kg/m²     Physical Exam  Vitals and nursing note reviewed.   Constitutional:       General: She is not in acute distress.     Appearance: Normal appearance.   Cardiovascular:      Rate and Rhythm: Normal rate and regular rhythm.      Pulses: Normal pulses.      Heart sounds: Normal heart sounds. No murmur.   Pulmonary:      Effort: Pulmonary effort is normal. No respiratory distress.      Breath sounds: Normal breath sounds.   Abdominal:      General: Abdomen is flat. Bowel sounds are normal.      Palpations: Abdomen is soft.      Tenderness: There is no abdominal tenderness.   Musculoskeletal:      Right lower leg: No edema.      Left lower leg: No edema.   Neurological:      Mental Status: She is alert and oriented to person, place, and time. Mental status is at baseline.   Psychiatric:         Attention and Perception: Attention normal.         Mood and Affect: Mood and affect normal. Mood is not anxious or depressed.         Behavior: Behavior normal.         Thought Content: Thought content normal.         Cognition and Memory: Cognition " normal.          Result Review :   The following data was reviewed by: Génesis Azul MD on 03/15/2021:  Labs: CBC, CMP, FLP, A1C, TSH, Vit D from [facility] 4/2019           Assessment and Plan      Diagnoses and all orders for this visit:    1. Anxiety and depression (Primary)  Assessment & Plan:  CONTROLLED  - cont wellbutrin and cymbalta at current doses--> refills sent today  - Reviewed potential side effects of medication including sexual performance changes, insomnia, fatigue, weight changes. Pt tolerating well without any issues.       Orders:  -     buPROPion XL (WELLBUTRIN XL) 150 MG 24 hr tablet; Take 1 tablet by mouth Daily.  Dispense: 90 tablet; Refill: 1  -     DULoxetine (CYMBALTA) 30 MG capsule; Take 1 capsule by mouth Daily.  Dispense: 90 capsule; Refill: 1    2. Polyneuropathy, unspecified  Assessment & Plan:  CONTROLLED  - cont gabapentin use, no refills needed today  - Rx for 600 mg TID which she uses on days she works and is on her feet more, on days off she may only take 1-2 times per day but always takes at night due to worsening symptmos first thing in the morning  - Reviewed controlled nature of substance, potential for abuse/addiction, and possible adverse effects of medication. No red flags for abuse at this time.   - Kwadwo checked and appropriate.           3. Gastroesophageal reflux disease with esophagitis without hemorrhage  Assessment & Plan:  CONTROLLED  - currently managing without any Rx medications, cont prn use of OTC meds only  - reflux precautions reviewed and cont to encourage dietary trigger avoidance        4. Seasonal allergies  -     levocetirizine (XYZAL) 5 MG tablet; Take 1 tablet by mouth Every Evening.  Dispense: 90 tablet; Refill: 3  -     fluticasone (Flonase) 50 MCG/ACT nasal spray; 2 sprays by Each Nare route Daily.  Dispense: 18.2 mL; Refill: 5      Follow Up   Return in about 5 months (around 8/15/2021) for Annual physical.    Patient was given  instructions and counseling regarding her condition or for health maintenance advice. Please see specific information pulled into the AVS if appropriate.     Lore Azul MD  [facility] [place] [facility]  Phone: [phone]  Fax: [phone]     Interpolation Flap Text: A decision was made to reconstruct the defect utilizing an interpolation axial flap and a staged reconstruction.  A telfa template was made of the defect.  This telfa template was then used to outline the interpolation flap.  The donor area for the pedicle flap was then injected with anesthesia.  The flap was excised through the skin and subcutaneous tissue down to the layer of the underlying musculature.  The interpolation flap was carefully excised within this deep plane to maintain its blood supply.  The edges of the donor site were undermined.   The donor site was closed in a primary fashion.  The pedicle was then rotated into position and sutured.  Once the tube was sutured into place, adequate blood supply was confirmed with blanching and refill.  The pedicle was then wrapped with xeroform gauze and dressed appropriately with a telfa and gauze bandage to ensure continued blood supply and protect the attached pedicle.

## 2023-05-23 ENCOUNTER — PREP FOR SURGERY (OUTPATIENT)
Dept: SURGERY | Facility: SURGERY CENTER | Age: 43
End: 2023-05-23
Payer: COMMERCIAL

## 2023-05-23 DIAGNOSIS — Z12.11 ENCOUNTER FOR SCREENING FOR MALIGNANT NEOPLASM OF COLON: Primary | ICD-10-CM

## 2023-05-23 DIAGNOSIS — Z80.0 FAMILY HISTORY OF COLON CANCER: ICD-10-CM

## 2023-05-23 RX ORDER — SODIUM CHLORIDE, SODIUM LACTATE, POTASSIUM CHLORIDE, CALCIUM CHLORIDE 600; 310; 30; 20 MG/100ML; MG/100ML; MG/100ML; MG/100ML
30 INJECTION, SOLUTION INTRAVENOUS CONTINUOUS PRN
OUTPATIENT
Start: 2023-05-23

## 2023-06-06 PROBLEM — Z12.11 ENCOUNTER FOR SCREENING FOR MALIGNANT NEOPLASM OF COLON: Status: ACTIVE | Noted: 2023-06-06

## 2023-06-06 PROBLEM — Z80.0 FAMILY HISTORY OF COLON CANCER: Status: ACTIVE | Noted: 2023-06-06

## 2023-06-08 ENCOUNTER — TELEPHONE (OUTPATIENT)
Dept: GASTROENTEROLOGY | Facility: CLINIC | Age: 43
End: 2023-06-08

## 2023-06-08 NOTE — TELEPHONE ENCOUNTER
RN called to discuss options for bowel prep. Unable to leave voicemail message for patient due to the mailbox being full. Will continue to try and reach patient. EL

## 2023-06-08 NOTE — TELEPHONE ENCOUNTER
Caller: Yadi Syed    Relationship: Self    Best call back number: [phone], IF CAN'T REACH ON CELL PLEASE TRY  WORK [phone]    What is the best time to reach you: ANYTIME    Who are you requesting to speak with (clinical staff, provider,  specific staff member): CLINICAL    What was the call regarding:SCOPE SCHEDULED ON MONDAY PT STATED THAT DURING PRE-SCREENING SHE TOLD THEM THAT DUCOLAX AND MIRALAX DOES NOT TYPICALLY WORK FOR HER. PT WAS TOLD TO CALL OFFICE AND CHECK TO SEE WHAT PREP INSTRUCTIONS SHOULD BE. CALL BACK ANYTIME. OK TO LVM ON CELL NUMBER NOT WORK.    Is it okay if the provider responds through [employer]: YES.

## 2023-06-08 NOTE — TELEPHONE ENCOUNTER
Please offer patient 2-day prep instructions.  I am happy to send in a prescription for a prescription strength bowel prep however please notify her that these are not always covered out-of-pocket and there may be an expense associated with the prep.    If she would like us to send in the prescription strength prep please verify patient's pharmacy.    ASHLEY Atkinson

## 2023-06-08 NOTE — SIGNIFICANT NOTE
Education provided the Patient on the following:    - Nothing to Eat or Drink after MN the night before the procedure    - Avoid red/purple fluids while completing their bowel prep as ordered by physician  -Contact Gastrointerologist office for any questions about specific details regarding colon prep    -You will need to have someone drive you home after your colonoscopy and remain with you for 24 hours after the procedure  - The date of your Surgery, you may have one visitor at bedside or within 10-15 minutes of Alevism Lomax  -Please wear warm socks when you arrive for your colonoscopy  -Remove all jewelry and leave any valuables before arriving the day of your procedure (all will have to be removed before leaving preop)  -You will need to arrive at 1115 on 6/12 for your colonoscopy    -Feel free to contact us at: [phone] with any additional questions/concerns

## 2023-06-09 NOTE — TELEPHONE ENCOUNTER
RN attempted to reach patient to discuss two day bowel prep and/or prescription bowel prep. Unable to reach patient or leave voicemail. Will continue to try and reach patient. EL

## 2023-06-12 ENCOUNTER — HOSPITAL ENCOUNTER (OUTPATIENT)
Facility: SURGERY CENTER | Age: 43
Setting detail: HOSPITAL OUTPATIENT SURGERY
Discharge: HOME | End: 2023-06-12
Attending: INTERNAL MEDICINE | Admitting: INTERNAL MEDICINE
Payer: COMMERCIAL

## 2023-06-12 ENCOUNTER — ANESTHESIA EVENT (OUTPATIENT)
Dept: SURGERY | Facility: SURGERY CENTER | Age: 43
End: 2023-06-12
Payer: COMMERCIAL

## 2023-06-12 ENCOUNTER — ANESTHESIA (OUTPATIENT)
Dept: SURGERY | Facility: SURGERY CENTER | Age: 43
End: 2023-06-12
Payer: COMMERCIAL

## 2023-06-12 ENCOUNTER — PREP FOR SURGERY (OUTPATIENT)
Dept: GASTROENTEROLOGY | Facility: CLINIC | Age: 43
End: 2023-06-12
Payer: COMMERCIAL

## 2023-06-12 VITALS
RESPIRATION RATE: 16 BRPM | WEIGHT: 185 LBS | HEART RATE: 87 BPM | OXYGEN SATURATION: 100 % | SYSTOLIC BLOOD PRESSURE: 96 MMHG | HEIGHT: 67 IN | DIASTOLIC BLOOD PRESSURE: 62 MMHG | TEMPERATURE: 98.2 F | BODY MASS INDEX: 29.03 KG/M2

## 2023-06-12 DIAGNOSIS — Z80.0 FAMILY HISTORY OF COLON CANCER: ICD-10-CM

## 2023-06-12 DIAGNOSIS — Z12.11 ENCOUNTER FOR SCREENING FOR MALIGNANT NEOPLASM OF COLON: ICD-10-CM

## 2023-06-12 DIAGNOSIS — Z12.11 ENCOUNTER FOR SCREENING FOR MALIGNANT NEOPLASM OF COLON: Primary | ICD-10-CM

## 2023-06-12 LAB
B-HCG UR QL: NEGATIVE
EXPIRATION DATE: NORMAL
INTERNAL NEGATIVE CONTROL: NEGATIVE
INTERNAL POSITIVE CONTROL: POSITIVE
Lab: NORMAL

## 2023-06-12 PROCEDURE — 88305 TISSUE EXAM BY PATHOLOGIST: CPT | Performed by: INTERNAL MEDICINE

## 2023-06-12 PROCEDURE — 45381 COLONOSCOPY SUBMUCOUS NJX: CPT | Performed by: INTERNAL MEDICINE

## 2023-06-12 PROCEDURE — 45385 COLONOSCOPY W/LESION REMOVAL: CPT | Performed by: INTERNAL MEDICINE

## 2023-06-12 PROCEDURE — 45380 COLONOSCOPY AND BIOPSY: CPT | Performed by: INTERNAL MEDICINE

## 2023-06-12 PROCEDURE — 25010000002 PROPOFOL 10 MG/ML EMULSION: Performed by: ANESTHESIOLOGY

## 2023-06-12 PROCEDURE — 81025 URINE PREGNANCY TEST: CPT | Performed by: NURSE PRACTITIONER

## 2023-06-12 DEVICE — REPLAY HEMOSTASIS CLIP, 16MM SPAN
Type: IMPLANTABLE DEVICE | Site: ASCENDING COLON | Status: FUNCTIONAL
Brand: REPLAY

## 2023-06-12 RX ORDER — SODIUM CHLORIDE 0.9 % (FLUSH) 0.9 %
3 SYRINGE (ML) INJECTION EVERY 12 HOURS SCHEDULED
Status: CANCELLED | OUTPATIENT
Start: 2023-06-12

## 2023-06-12 RX ORDER — SODIUM CHLORIDE 0.9 % (FLUSH) 0.9 %
10 SYRINGE (ML) INJECTION AS NEEDED
Status: DISCONTINUED | OUTPATIENT
Start: 2023-06-12 | End: 2023-06-12 | Stop reason: HOSPADM

## 2023-06-12 RX ORDER — SODIUM CHLORIDE, SODIUM LACTATE, POTASSIUM CHLORIDE, CALCIUM CHLORIDE 600; 310; 30; 20 MG/100ML; MG/100ML; MG/100ML; MG/100ML
30 INJECTION, SOLUTION INTRAVENOUS CONTINUOUS PRN
Status: CANCELLED | OUTPATIENT
Start: 2023-06-12

## 2023-06-12 RX ORDER — LIDOCAINE HYDROCHLORIDE 20 MG/ML
INJECTION, SOLUTION INFILTRATION; PERINEURAL AS NEEDED
Status: DISCONTINUED | OUTPATIENT
Start: 2023-06-12 | End: 2023-06-12 | Stop reason: SURG

## 2023-06-12 RX ORDER — SODIUM CHLORIDE 0.9 % (FLUSH) 0.9 %
3 SYRINGE (ML) INJECTION EVERY 12 HOURS SCHEDULED
Status: DISCONTINUED | OUTPATIENT
Start: 2023-06-12 | End: 2023-06-12 | Stop reason: HOSPADM

## 2023-06-12 RX ORDER — SODIUM CHLORIDE, SODIUM LACTATE, POTASSIUM CHLORIDE, CALCIUM CHLORIDE 600; 310; 30; 20 MG/100ML; MG/100ML; MG/100ML; MG/100ML
30 INJECTION, SOLUTION INTRAVENOUS CONTINUOUS PRN
Status: DISCONTINUED | OUTPATIENT
Start: 2023-06-12 | End: 2023-06-12 | Stop reason: HOSPADM

## 2023-06-12 RX ORDER — SODIUM CHLORIDE 0.9 % (FLUSH) 0.9 %
10 SYRINGE (ML) INJECTION AS NEEDED
Status: CANCELLED | OUTPATIENT
Start: 2023-06-12

## 2023-06-12 RX ADMIN — LIDOCAINE HYDROCHLORIDE 60 MG: 20 INJECTION, SOLUTION INFILTRATION; PERINEURAL at 13:23

## 2023-06-12 RX ADMIN — PROPOFOL INJECTABLE EMULSION 200 MCG/KG/MIN: 10 INJECTION, EMULSION INTRAVENOUS at 13:24

## 2023-06-12 RX ADMIN — SODIUM CHLORIDE, POTASSIUM CHLORIDE, SODIUM LACTATE AND CALCIUM CHLORIDE 30 ML/HR: 600; 310; 30; 20 INJECTION, SOLUTION INTRAVENOUS at 12:12

## 2023-06-12 NOTE — DISCHARGE INSTRUCTIONS
ENDOSCOPY - EGD/COLONOSCOPY       ADULT CARE DISCHARGE  INSTRUCTIONS     Symptoms you may temporarily experience:      Sore Throat     Hoarseness     Bloating/Cramping     Dizziness     IV Irritation/tenderness     Gas or Belching     Slight fever     Small amount of blood in vomit or stool       Call Your Doctor for the following Problems: ______________________     ________________     Fever of 101 degrees or higher       Sharp abdominal  pain     Red streak up the arm from the IV site     Severe cramping        Large amount of blood in stool or vomit       Instructions for the next 24 hours after your Procedure:     Adult supervision     Do NOT drink any alcohol      Do not work today     NO important decisions     DO NOT sign any legal documents     You may shower/ bathe       DO NOT  DRIVE or operate machinery     Resume normal activity tomorrow       Discharge  Diet:     Avoid spicy/ greasy foods     Avoid any food that will cause more gas or bloating       *** Seek IMMEDIATE medical attention and call 911 if you develop symptoms such as:     Chest pain     Shortness of breath     Severe bleeding   POST CLIP INSTRUCTIONS    The Resolution 360 clip is a new device that has been developed to stop or prevent bleeding during GI endoscopic procedures. The device consists of a small metal clip that is attached to the end of an endoscope. The clip is designed to be deployed at the site of bleeding or site where possible bleeding could occur.  It compresses the blood vessel and stops the bleeding.      _______ clip(s) was placed in your body on ______________________ to control bleeding in your GI tract.  If scheduled for an MRI, please inform the technologist you should have an X-ray prior to your MRI to confirm the metal clip has passed.

## 2023-06-12 NOTE — ANESTHESIA POSTPROCEDURE EVALUATION
"Patient: Yadi MALIN May    Procedure Summary     Date: 06/12/23 Room / Location: [facility] OR [facility] / [facility]    Anesthesia Start: 1321 Anesthesia Stop: 1406    Procedure: COLONOSCOPY to cecum with Polypectomy Diagnosis:       Encounter for screening for malignant neoplasm of colon      Family history of colon cancer      (Encounter for screening for malignant neoplasm of colon [Z12.11])      (Family history of colon cancer [Z80.0])    Surgeons: Francois Ha MD Provider: Huy Benton MD    Anesthesia Type: MAC ASA Status: 3          Anesthesia Type: MAC    Vitals  Vitals Value Taken Time   BP 96/62 06/12/23 1428   Temp 36.8 °C (98.2 °F) 06/12/23 1407   Pulse 87 06/12/23 1428   Resp 16 06/12/23 1428   SpO2 100 % 06/12/23 1428           Post Anesthesia Care and Evaluation    Patient location during evaluation: bedside  Patient participation: complete - patient participated  Level of consciousness: awake and alert  Pain management: adequate    Airway patency: patent  Anesthetic complications: No anesthetic complications    Cardiovascular status: acceptable  Respiratory status: acceptable  Hydration status: acceptable    Comments: BP 96/62 (BP Location: Left arm, Patient Position: Lying)   Pulse 87   Temp 36.8 °C (98.2 °F) (Temporal)   Resp 16   Ht 170.2 cm (67\")   Wt 83.9 kg (185 lb)   SpO2 100%   BMI 28.98 kg/m²       "

## 2023-06-13 ENCOUNTER — TELEPHONE (OUTPATIENT)
Dept: INTERNAL MEDICINE | Facility: CLINIC | Age: 43
End: 2023-06-13

## 2023-06-13 ENCOUNTER — OFFICE VISIT (OUTPATIENT)
Dept: INTERNAL MEDICINE | Facility: CLINIC | Age: 43
End: 2023-06-13
Payer: COMMERCIAL

## 2023-06-13 VITALS
TEMPERATURE: 97.3 F | RESPIRATION RATE: 16 BRPM | HEART RATE: 94 BPM | SYSTOLIC BLOOD PRESSURE: 108 MMHG | HEIGHT: 67 IN | BODY MASS INDEX: 29.51 KG/M2 | WEIGHT: 188 LBS | OXYGEN SATURATION: 99 % | DIASTOLIC BLOOD PRESSURE: 74 MMHG

## 2023-06-13 DIAGNOSIS — M70.62 TROCHANTERIC BURSITIS OF BOTH HIPS: Primary | Chronic | ICD-10-CM

## 2023-06-13 DIAGNOSIS — M70.61 TROCHANTERIC BURSITIS OF BOTH HIPS: Primary | Chronic | ICD-10-CM

## 2023-06-13 DIAGNOSIS — R41.840 ATTENTION AND CONCENTRATION DEFICIT: ICD-10-CM

## 2023-06-13 DIAGNOSIS — F32.A ANXIETY AND DEPRESSION: ICD-10-CM

## 2023-06-13 DIAGNOSIS — Z12.4 CERVICAL CANCER SCREENING: ICD-10-CM

## 2023-06-13 DIAGNOSIS — F41.9 ANXIETY AND DEPRESSION: ICD-10-CM

## 2023-06-13 RX ORDER — TRIAMCINOLONE ACETONIDE 40 MG/ML
40 INJECTION, SUSPENSION INTRA-ARTICULAR; INTRAMUSCULAR
Status: COMPLETED | OUTPATIENT
Start: 2023-06-13 | End: 2023-06-13

## 2023-06-13 RX ADMIN — TRIAMCINOLONE ACETONIDE 40 MG: 40 INJECTION, SUSPENSION INTRA-ARTICULAR; INTRAMUSCULAR at 22:38

## 2023-06-13 RX ADMIN — TRIAMCINOLONE ACETONIDE 40 MG: 40 INJECTION, SUSPENSION INTRA-ARTICULAR; INTRAMUSCULAR at 22:36

## 2023-06-13 NOTE — TELEPHONE ENCOUNTER
Called Pt to inform her of her missed appt today (6/13/2023) with  and that it would be marked as a no show, no answer so letter has been sent via mail.

## 2023-06-13 NOTE — PROGRESS NOTES
"Chief Complaint  Hip Pain    Subjective          Yadi Syed presents to [facility] for ongoing hip pain. Pains are present on both side. She had been on gabapentin routinely but stopped bc felt like it wasn't working. Tried to restart again yesterday but pain is still present. Pains are worse in the am and at night when she is trying to sleep.   Has been off all of her psych meds x 1 month now, has had a lot of life stressors during this time as well. Feels like she doing ok off meds, has had more energy since she stopped them and has been more productive which makes her happier, but notes she is probably leaning more towards fidelia whereas she was more flat and depressed before stopping.     Objective   Vital Signs:     /74   Pulse 94   Temp 97.3 °F (36.3 °C)   Resp 16   Ht 170.2 cm (67\")   Wt 85.3 kg (188 lb)   SpO2 99%   BMI 29.44 kg/m²     Physical Exam  Vitals and nursing note reviewed.   Constitutional:       General: She is not in acute distress.     Appearance: Normal appearance.   Pulmonary:      Effort: Pulmonary effort is normal. No respiratory distress.   Musculoskeletal:      Comments: TTP over bilateral lateral hip with pressure over trochanteric bursa, stiffness with gait after sitting and with hip flexion   Neurological:      Mental Status: She is alert and oriented to person, place, and time. Mental status is at baseline.   Psychiatric:         Mood and Affect: Mood normal.         Thought Content: Thought content normal.         Judgment: Judgment normal.      Arthrocentesis    Date/Time: 6/13/2023 10:36 PM  Performed by: Génesis Azul MD  Authorized by: Génesis Azul MD   Indications: pain   Body area: hip  Joint: right hip  Local anesthesia used: yes    Anesthesia:  Local anesthesia used: yes  Local Anesthetic: lidocaine 2% without epinephrine  Anesthetic total: 2 mL    Sedation:  Patient sedated: no    Preparation: " Patient was prepped and draped in the usual sterile fashion.  Needle gauge: 23G.  Ultrasound guidance: no  Approach: lateral  Meds administered: 40 mg triamcinolone acetonide 40 MG/ML  Patient tolerance: patient tolerated the procedure well with no immediate complications  Comments: Indication: trochanteric bursitis        Arthrocentesis    Date/Time: 6/13/2023 10:38 PM  Performed by: Génesis Azul MD  Authorized by: Génesis Azul MD   Indications: pain   Body area: hip  Joint: left hip  Local anesthesia used: yes    Anesthesia:  Local anesthesia used: yes  Local Anesthetic: lidocaine 2% without epinephrine  Anesthetic total: 2 mL    Sedation:  Patient sedated: no    Preparation: Patient was prepped and draped in the usual sterile fashion.  Needle gauge: 23G.  Ultrasound guidance: no  Approach: lateral  Meds administered: 40 mg triamcinolone acetonide 40 MG/ML  Patient tolerance: patient tolerated the procedure well with no immediate complications  Comments: Indication: trochanteric bursitis          Result Review :   The following data was reviewed by: Génesis Azul MD on 06/13/2023:    CBC w/diff          1/24/2023    22:02   CBC w/Diff   WBC 9.20       RBC 4.86       Hemoglobin 14.7       Hematocrit 44.5       MCV 91.6       MCH 30.2       MCHC 33.0       RDW 13.4       Platelets 356       Neutrophil Rel % 47.7       Immature Granulocyte Rel % 0.2       Lymphocyte Rel % 40.5       Monocyte Rel % 7.4       Eosinophil Rel % 3.8       Basophil Rel % 0.4          Details          This result is from an external source.                 Assessment and Plan      Diagnoses and all orders for this visit:    1. Trochanteric bursitis of both hips (Primary)   - pt with history of this in the past, last injection 1 year ago, will repeat today   - bilateral trochanteric injections as above, tolerated well   - reviewed risks and benefits    2. Anxiety and depression  3. Attention and concentration deficit   -  pt currently unmedication, was most recently on cymbalta and wellbutrin   - has tried several medications for ADHD including stimulant and non-stimulant and has not tolerated well, manages by lifestyle and behavioral modifications only but do heavily suspect this contributes to her mood overall and her poor memory   - some concerns about possible manic behaviors, but at this time pt well supported and with good insight into her own condition, will reach back out if she needs to restart meds    4. Cervical cancer screening   - most recent PAP ~9 years ago and reportedly abn but no follow up due to extenuating life circumstances at the time   - will send to GYN rather than perform follow up PAP here as pt will likely need some intervention     Orders:  -     Ambulatory Referral to Obstetrics / Gynecology        Follow Up   Return if symptoms worsen or fail to improve.    Patient was given instructions and counseling regarding her condition or for health maintenance advice. Please see specific information pulled into the AVS if appropriate.     Lore Azul MD  [facility] [place] [facility] [place] [facility]  Phone: [phone]  Fax: [phone]

## 2023-06-14 LAB
LAB AP CASE REPORT: NORMAL
LAB AP CLINICAL INFORMATION: NORMAL
LAB AP DIAGNOSIS COMMENT: NORMAL
PATH REPORT.FINAL DX SPEC: NORMAL
PATH REPORT.GROSS SPEC: NORMAL

## 2023-08-08 ENCOUNTER — OFFICE VISIT (OUTPATIENT)
Dept: INTERNAL MEDICINE | Facility: CLINIC | Age: 43
End: 2023-08-08
Payer: COMMERCIAL

## 2023-08-08 VITALS
BODY MASS INDEX: 30.61 KG/M2 | RESPIRATION RATE: 16 BRPM | OXYGEN SATURATION: 98 % | HEIGHT: 67 IN | WEIGHT: 195 LBS | HEART RATE: 107 BPM | SYSTOLIC BLOOD PRESSURE: 110 MMHG | TEMPERATURE: 98.7 F | DIASTOLIC BLOOD PRESSURE: 74 MMHG

## 2023-08-08 DIAGNOSIS — N92.6 IRREGULAR MENSES: Primary | ICD-10-CM

## 2023-08-08 DIAGNOSIS — M54.50 ACUTE BILATERAL LOW BACK PAIN WITHOUT SCIATICA: ICD-10-CM

## 2023-08-08 LAB
BILIRUB BLD-MCNC: NEGATIVE MG/DL
CLARITY, POC: CLEAR
COLOR UR: YELLOW
EXPIRATION DATE: ABNORMAL
GLUCOSE UR STRIP-MCNC: NEGATIVE MG/DL
KETONES UR QL: NEGATIVE
LEUKOCYTE EST, POC: ABNORMAL
Lab: ABNORMAL
NITRITE UR-MCNC: NEGATIVE MG/ML
PH UR: 5 [PH] (ref 5–8)
PROT UR STRIP-MCNC: NEGATIVE MG/DL
RBC # UR STRIP: ABNORMAL /UL
SP GR UR: 1.01 (ref 1–1.03)
UROBILINOGEN UR QL: NORMAL

## 2023-08-08 RX ORDER — MELOXICAM 15 MG/1
15 TABLET ORAL DAILY
Qty: 30 TABLET | Refills: 1 | Status: SHIPPED | OUTPATIENT
Start: 2023-08-08

## 2023-08-08 RX ORDER — KETOROLAC TROMETHAMINE 30 MG/ML
60 INJECTION, SOLUTION INTRAMUSCULAR; INTRAVENOUS ONCE
Status: COMPLETED | OUTPATIENT
Start: 2023-08-08 | End: 2023-08-08

## 2023-08-08 RX ORDER — BACLOFEN 10 MG/1
10 TABLET ORAL 3 TIMES DAILY PRN
Qty: 60 TABLET | Refills: 1 | Status: SHIPPED | OUTPATIENT
Start: 2023-08-08

## 2023-08-08 RX ADMIN — KETOROLAC TROMETHAMINE 60 MG: 30 INJECTION, SOLUTION INTRAMUSCULAR; INTRAVENOUS at 16:12

## 2023-08-08 NOTE — PROGRESS NOTES
"Chief Complaint  Follow-up, Back Pain, Anxiety, Depression, and Menstrual Problem    Subjective          Yadi Syed presents to [facility] for back pain.   Pt went tubing about 2 weeks ago and was fine for the couple days to follow, but then back pain hit acutely 4 days later. Pain is bilaterally across her lumbar spine, no radiating symptoms. Tried some muscle relaxers for a couple nights with no benefit. Has started seeing chiropractor this morning and has appt again tomorrow.   Has appt pending with GYN but has noticed significant changes in her cycle over past 9 months. Previously always very regular but now having cycles every 2 weeks. Cycles still last <7 days at a time and is bleeding a normal amount, but mostly brownish/black blood and never BRB at this time.     Objective   Vital Signs:     /74   Pulse 107   Temp 98.7 øF (37.1 øC)   Resp 16   Ht 170.2 cm (67\")   Wt 88.5 kg (195 lb)   SpO2 98%   BMI 30.54 kg/mý     Physical Exam  Vitals and nursing note reviewed.   Constitutional:       General: She is not in acute distress.     Appearance: Normal appearance.   Pulmonary:      Effort: Pulmonary effort is normal. No respiratory distress.   Musculoskeletal:      Comments: No vertebral body tenderness, some pain to palpation over R lumbar paraspinous muscle tenderness, neg log roll test, neg straight leg raise, full ROM and strength in BLE, dampened patellar DTR on R compared to L but no other sensory or neurologic symptoms   Neurological:      Mental Status: She is alert and oriented to person, place, and time. Mental status is at baseline.   Psychiatric:         Mood and Affect: Mood normal.         Thought Content: Thought content normal.         Judgment: Judgment normal.        Brief Urine Lab Results  (Last result in the past 365 days)        Color   Clarity   Blood   Leuk Est   Nitrite   Protein   CREAT   Urine HCG        08/08/23 1616 " Yellow   Clear   Moderate   75 Mitchell/ul   Negative   Negative                     Result Review :   The following data was reviewed by: Génesis Azul MD on 08/08/2023:    CBC w/diff          1/24/2023    22:02   CBC w/Diff   WBC 9.20       RBC 4.86       Hemoglobin 14.7       Hematocrit 44.5       MCV 91.6       MCH 30.2       MCHC 33.0       RDW 13.4       Platelets 356       Neutrophil Rel % 47.7       Immature Granulocyte Rel % 0.2       Lymphocyte Rel % 40.5       Monocyte Rel % 7.4       Eosinophil Rel % 3.8       Basophil Rel % 0.4          Details          This result is from an external source.                  Assessment and Plan      Diagnoses and all orders for this visit:    1. Irregular menses (Primary)   - likely perimenopausal given changes and symptoms, however, this is new for this patient and she is on the younger end of the spectrum for this    - will get labs as below to look for other etiology   - will send for US to look for any cysts or fibroids that could be contributing to her symptoms     Orders:  -     CBC auto differential  -     DHEA-sulfate  -     Ferritin  -     Follicle stimulating hormone  -     Luteinizing hormone  -     Prolactin  -     Testosterone Free Direct  -     TSH  -     US Non-ob Transvaginal; Future    2. Acute bilateral low back pain without sciatica   - feels different than her typical MSK low back pain, no real sciatic features on exam today   - UA today with some blood and LE but has been having more frequent menses as well--> will send for culture   - will send scheduled NSAIDS with meloxicam   - new muscle relaxer to try for prn use   - will give toradol inj today as pt has been given this before for LBP with improvement     Orders:  -     POC Urinalysis Dipstick, Automated  -     meloxicam (MOBIC) 15 MG tablet; Take 1 tablet by mouth Daily.  Dispense: 30 tablet; Refill: 1  -     baclofen (LIORESAL) 10 MG tablet; Take 1 tablet by mouth 3 (Three) Times a Day As  Needed for Muscle Spasms.  Dispense: 60 tablet; Refill: 01  -     ketorolac (TORADOL) injection 60 mg        Follow Up   Return if symptoms worsen or fail to improve.    Patient was given instructions and counseling regarding her condition or for health maintenance advice. Please see specific information pulled into the AVS if appropriate.     Lore Azul MD  [facility] [place] [facility] [place] [facility]  Phone: [phone]  Fax: [phone]

## 2023-08-09 ENCOUNTER — LAB (OUTPATIENT)
Dept: INTERNAL MEDICINE | Facility: CLINIC | Age: 43
End: 2023-08-09
Payer: COMMERCIAL

## 2023-08-09 DIAGNOSIS — M54.50 ACUTE BILATERAL LOW BACK PAIN WITHOUT SCIATICA: Primary | ICD-10-CM

## 2023-08-09 DIAGNOSIS — M51.16 LUMBAR DISC HERNIATION WITH RADICULOPATHY: ICD-10-CM

## 2023-08-09 RX ORDER — GABAPENTIN 300 MG/1
CAPSULE ORAL
Qty: 180 CAPSULE | Refills: 5 | Status: SHIPPED | OUTPATIENT
Start: 2023-08-09

## 2023-08-11 ENCOUNTER — TELEPHONE (OUTPATIENT)
Dept: INTERNAL MEDICINE | Facility: CLINIC | Age: 43
End: 2023-08-11
Payer: COMMERCIAL

## 2023-08-11 LAB
BACTERIA UR CULT: NORMAL
BACTERIA UR CULT: NORMAL

## 2023-08-11 NOTE — TELEPHONE ENCOUNTER
No, she wanted to know if there was infection any where else in her body because her white count was elevated in the urine. I told her there shouldn't be and we should wait for the bloodwork to come back to investigate further

## 2023-08-11 NOTE — TELEPHONE ENCOUNTER
Patient called to see if she has any other infection. Patient was informed about her lab results and has more questions. Please advise patient.

## 2023-08-17 LAB
BASOPHILS # BLD AUTO: 0.05 10*3/MM3 (ref 0–0.2)
BASOPHILS NFR BLD AUTO: 0.5 % (ref 0–1.5)
DHEA-S SERPL-MCNC: 133 UG/DL (ref 57.3–279.2)
EOSINOPHIL # BLD AUTO: 0.2 10*3/MM3 (ref 0–0.4)
EOSINOPHIL NFR BLD AUTO: 2 % (ref 0.3–6.2)
ERYTHROCYTE [DISTWIDTH] IN BLOOD BY AUTOMATED COUNT: 13.5 % (ref 12.3–15.4)
FERRITIN SERPL-MCNC: 15.2 NG/ML (ref 13–150)
FSH SERPL-ACNC: 5.8 MIU/ML
HCT VFR BLD AUTO: 48 % (ref 34–46.6)
HGB BLD-MCNC: 15.9 G/DL (ref 12–15.9)
IMM GRANULOCYTES # BLD AUTO: 0.03 10*3/MM3 (ref 0–0.05)
IMM GRANULOCYTES NFR BLD AUTO: 0.3 % (ref 0–0.5)
LH SERPL-ACNC: 2 MIU/ML
LYMPHOCYTES # BLD AUTO: 2.98 10*3/MM3 (ref 0.7–3.1)
LYMPHOCYTES NFR BLD AUTO: 29.9 % (ref 19.6–45.3)
MCH RBC QN AUTO: 31.5 PG (ref 26.6–33)
MCHC RBC AUTO-ENTMCNC: 33.1 G/DL (ref 31.5–35.7)
MCV RBC AUTO: 95 FL (ref 79–97)
MONOCYTES # BLD AUTO: 0.83 10*3/MM3 (ref 0.1–0.9)
MONOCYTES NFR BLD AUTO: 8.3 % (ref 5–12)
NEUTROPHILS # BLD AUTO: 5.88 10*3/MM3 (ref 1.7–7)
NEUTROPHILS NFR BLD AUTO: 59 % (ref 42.7–76)
NRBC BLD AUTO-RTO: 0 /100 WBC (ref 0–0.2)
PLATELET # BLD AUTO: 380 10*3/MM3 (ref 140–450)
PROLACTIN SERPL-MCNC: 9.9 NG/ML (ref 4.8–23.3)
RBC # BLD AUTO: 5.05 10*6/MM3 (ref 3.77–5.28)
TESTOST FREE SERPL-MCNC: 0.2 PG/ML (ref 0–4.2)
TSH SERPL DL<=0.005 MIU/L-ACNC: 1.82 UIU/ML (ref 0.27–4.2)
WBC # BLD AUTO: 9.97 10*3/MM3 (ref 3.4–10.8)

## 2023-08-18 DIAGNOSIS — N92.6 IRREGULAR MENSES: Primary | ICD-10-CM

## 2023-08-22 ENCOUNTER — OFFICE VISIT (OUTPATIENT)
Dept: INTERNAL MEDICINE | Facility: CLINIC | Age: 43
End: 2023-08-22
Payer: COMMERCIAL

## 2023-08-22 VITALS
BODY MASS INDEX: 30.61 KG/M2 | HEART RATE: 84 BPM | RESPIRATION RATE: 16 BRPM | WEIGHT: 195 LBS | TEMPERATURE: 97.3 F | OXYGEN SATURATION: 99 % | HEIGHT: 67 IN

## 2023-08-22 DIAGNOSIS — R09.81 NASAL CONGESTION: ICD-10-CM

## 2023-08-22 DIAGNOSIS — J02.9 SORE THROAT: Primary | ICD-10-CM

## 2023-08-22 DIAGNOSIS — T75.3XXA MOTION SICKNESS, INITIAL ENCOUNTER: ICD-10-CM

## 2023-08-22 LAB
EXPIRATION DATE: NORMAL
EXPIRATION DATE: NORMAL
FLUAV AG UPPER RESP QL IA.RAPID: NOT DETECTED
FLUBV AG UPPER RESP QL IA.RAPID: NOT DETECTED
INTERNAL CONTROL: NORMAL
INTERNAL CONTROL: NORMAL
IRON SERPL-MCNC: NORMAL UG/DL
Lab: NORMAL
Lab: NORMAL
REQUEST PROBLEM: NORMAL
S PYO RRNA THROAT QL PROBE: NEGATIVE
SARS-COV-2 AG UPPER RESP QL IA.RAPID: NOT DETECTED
TIBC SERPL-MCNC: NORMAL MCG/DL
UIBC SERPL-MCNC: NORMAL UG/DL
WRITTEN AUTHORIZATION: NORMAL

## 2023-08-22 RX ORDER — ONDANSETRON 4 MG/1
4 TABLET, ORALLY DISINTEGRATING ORAL EVERY 8 HOURS PRN
Qty: 15 TABLET | Refills: 0 | Status: SHIPPED | OUTPATIENT
Start: 2023-08-22

## 2023-08-22 RX ORDER — AZITHROMYCIN 250 MG/1
TABLET, FILM COATED ORAL
Qty: 6 TABLET | Refills: 0 | Status: SHIPPED | OUTPATIENT
Start: 2023-08-22

## 2023-08-22 NOTE — PROGRESS NOTES
"Chief Complaint  Sore Throat (/) and Cough    Subjective          Yadi Syed presents to [facility] for sore throat and cough. Ongoing x 6 days now. Started as mostly a sore throat, now has progressed to excessive mucus that she is coughing up now but otherwise does not feel like she actually needs to cough.     Objective   Vital Signs:     Pulse 84   Temp 97.3 øF (36.3 øC)   Resp 16   Ht 170.2 cm (67\")   Wt 88.5 kg (195 lb)   SpO2 99%   BMI 30.54 kg/mý     Physical Exam  Vitals and nursing note reviewed.   Constitutional:       General: She is not in acute distress.     Appearance: Normal appearance.   HENT:      Head: Normocephalic and atraumatic.      Right Ear: Ear canal and external ear normal. No middle ear effusion.      Left Ear: Ear canal and external ear normal.  No middle ear effusion.      Nose: Congestion present. No nasal deformity.      Right Turbinates: Swollen.      Left Turbinates: Swollen.      Right Sinus: Maxillary sinus tenderness and frontal sinus tenderness present.      Left Sinus: Maxillary sinus tenderness and frontal sinus tenderness present.      Mouth/Throat:      Mouth: Mucous membranes are moist.      Pharynx: Posterior oropharyngeal erythema present. No oropharyngeal exudate.      Tonsils: No tonsillar exudate. 2+ on the right. 2+ on the left.   Eyes:      General:         Right eye: No discharge.         Left eye: No discharge.      Extraocular Movements: Extraocular movements intact.      Conjunctiva/sclera: Conjunctivae normal.      Pupils: Pupils are equal, round, and reactive to light.   Cardiovascular:      Rate and Rhythm: Normal rate and regular rhythm.      Heart sounds: No murmur heard.  Pulmonary:      Effort: Pulmonary effort is normal. No respiratory distress.      Breath sounds: Normal breath sounds. No wheezing or rhonchi.   Abdominal:      General: Bowel sounds are normal. There is no distension.      " Palpations: Abdomen is soft. There is no mass.      Tenderness: There is no abdominal tenderness.   Musculoskeletal:         General: Normal range of motion.      Cervical back: Normal range of motion and neck supple.   Skin:     General: Skin is warm and dry.      Capillary Refill: Capillary refill takes less than 2 seconds.      Findings: No rash.   Neurological:      General: No focal deficit present.      Mental Status: She is alert and oriented to person, place, and time.   Psychiatric:         Mood and Affect: Mood normal.         Behavior: Behavior normal.        Lab Results   Component Value Date    RAPSCRN Negative 08/22/2023      POC COVID/Flu: negative    Result Review : : None       Assessment and Plan      Diagnoses and all orders for this visit:    1. Sore throat (Primary)  2. Nasal congestion   - all POCT negative as above   - cont supportive care measures   - will send Z pack as below for building sinusitis as this has always worked for patient previously and she leaves for trip tomorrow          -    POC Rapid Strep  -     Covid-19 + Flu A&B AG, Veritor  -     azithromycin (Zithromax Z-Raul) 250 MG tablet; Take 2 tablets by mouth on day 1, then 1 tablet daily on days 2-5  Dispense: 6 tablet; Refill: 0    3. Motion sickness, initial encounter  -     ondansetron ODT (ZOFRAN-ODT) 4 MG disintegrating tablet; Place 1 tablet on the tongue Every 8 (Eight) Hours As Needed for Nausea or Vomiting (motion sickness).  Dispense: 15 tablet; Refill: 0        Follow Up   Return if symptoms worsen or fail to improve.    Patient was given instructions and counseling regarding her condition or for health maintenance advice. Please see specific information pulled into the AVS if appropriate.     Lore Azul MD  [facility] [place] [facility] [place] [facility]  Phone: [phone]  Fax: [phone]

## 2023-09-05 ENCOUNTER — HOSPITAL ENCOUNTER (OUTPATIENT)
Dept: ULTRASOUND IMAGING | Facility: HOSPITAL | Age: 43
Discharge: HOME | End: 2023-09-05
Admitting: INTERNAL MEDICINE
Payer: COMMERCIAL

## 2023-09-05 DIAGNOSIS — N92.6 IRREGULAR MENSES: ICD-10-CM

## 2023-09-05 PROCEDURE — 76830 TRANSVAGINAL US NON-OB: CPT

## 2023-09-05 PROCEDURE — 76856 US EXAM PELVIC COMPLETE: CPT

## 2023-10-11 NOTE — TELEPHONE ENCOUNTER
PATIENT CALLED AND REQUESTED A REFILL OF HER CYMBALTA, PLEASE.    THANK YOU   TELEHEALTH EVALUATION   HPI:  Curtis Rivera (:  1956) is a 61 y.o. female,  here for evaluation of the following chief complaint(s):  Medication Refill  Cholesterol , htn    ASSESSMENT/PLAN:   Diagnosis Orders   1. Dyslipidemia        2. Gastroesophageal reflux disease without esophagitis        3. Primary hypertension          Curtis was seen today for medication refill. Diagnoses and all orders for this visit:    Dyslipidemia    Gastroesophageal reflux disease without esophagitis    Primary hypertension    Refilled medications  -     atorvastatin (LIPITOR) 40 MG tablet; Take 1 tablet by mouth daily  -     metoprolol succinate (TOPROL XL) 50 MG extended release tablet; Take 1 tablet by mouth daily  -     potassium chloride (KLOR-CON M) 10 MEQ extended release tablet; TAKE 1 TABLET TWICE A DAY    Discussed mental health due to  in hospice at home. She will reach out if needs help, she can do counseling if desired with our [address] [facility]:  HPI   dx with brain cancer in April, now in hospice at home. Is compliant, taking the medication as prescribed. Review of Systems   As above  Allergic/Immunologic: Negative for immunocompromised state. Psychiatric/Behavioral: Negative for agitation, behavioral problems and confusion. Physical Exam    Constitutional: [x] Appears well-developed and well-nourished [x] No apparent distress      [] Abnormal-   Mental status  [x] Alert and awake  [x] Oriented to person/place/time [x]Able to follow commands      Eyes:  EOM    [x]  Normal  [] Abnormal-  Sclera  [x]  Normal  [] Abnormal -         Discharge [x]  None visible  [] Abnormal -    HENT:   [x] Normocephalic, atraumatic.   [] Abnormal   [] Mouth/Throat: Mucous membranes are moist.     External Ears [x] Normal  [] Abnormal-     Neck: [x] No visualized mass     Pulmonary/Chest: [x] Respiratory effort normal.  [x] No visualized signs of difficulty breathing or respiratory

## 2023-10-16 DIAGNOSIS — M54.50 ACUTE BILATERAL LOW BACK PAIN WITHOUT SCIATICA: ICD-10-CM

## 2023-10-17 ENCOUNTER — OFFICE VISIT (OUTPATIENT)
Dept: OBSTETRICS AND GYNECOLOGY | Facility: CLINIC | Age: 43
End: 2023-10-17
Payer: COMMERCIAL

## 2023-10-17 VITALS
WEIGHT: 183 LBS | SYSTOLIC BLOOD PRESSURE: 114 MMHG | DIASTOLIC BLOOD PRESSURE: 81 MMHG | HEIGHT: 67 IN | BODY MASS INDEX: 28.72 KG/M2

## 2023-10-17 DIAGNOSIS — Z12.31 BREAST CANCER SCREENING BY MAMMOGRAM: ICD-10-CM

## 2023-10-17 DIAGNOSIS — Z01.419 WELL WOMAN EXAM WITH ROUTINE GYNECOLOGICAL EXAM: Primary | ICD-10-CM

## 2023-10-17 DIAGNOSIS — Z12.4 CERVICAL CANCER SCREENING: ICD-10-CM

## 2023-10-17 DIAGNOSIS — Z87.42 HISTORY OF ABNORMAL CERVICAL PAP SMEAR: ICD-10-CM

## 2023-10-17 DIAGNOSIS — R31.21 ASYMPTOMATIC MICROSCOPIC HEMATURIA: ICD-10-CM

## 2023-10-17 DIAGNOSIS — N84.1 CERVICAL POLYP: ICD-10-CM

## 2023-10-17 RX ORDER — MELOXICAM 15 MG/1
15 TABLET ORAL DAILY
Qty: 30 TABLET | Refills: 0 | Status: SHIPPED | OUTPATIENT
Start: 2023-10-17

## 2023-10-17 NOTE — TELEPHONE ENCOUNTER
Rx Refill Note  Requested Prescriptions     Pending Prescriptions Disp Refills    meloxicam (MOBIC) 15 MG tablet [Pharmacy Med Name: Meloxicam 15 MG Oral Tablet] 30 tablet 0     Sig: Take 1 tablet by mouth once daily      Last office visit with prescribing clinician: 8/22/2023   Last telemedicine visit with prescribing clinician: Visit date not found   Next office visit with prescribing clinician: Visit date not found                         Would you like a call back once the refill request has been completed: [] Yes [] No    If the office needs to give you a call back, can they leave a voicemail: [] Yes [] No    Aleena Santiago MA  10/17/23, 07:32 EDT

## 2023-10-17 NOTE — PROGRESS NOTES
GYN Annual Exam     CC- Here for annual exam and menstrual problem      Yadi Syed is a 43 y.o. female who presents for annual well woman exam. This is a new patient.   She reports that she was having normal periods every 28 days until about the last year when she began to have 2 periods a month. She states she would have a normal period and then bleed around the time of ovulation. She has a normal pelvic ultrasound and labs performed by PCP. She states the last 2 periods have been normal. She has also had a tremendous amount of ovarian pain where it feels like they are going to fall out. This is new and has been happening over the last month. No specific timing.   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   She also reports always having blood in her urine when it is tested. She had kidney reflux disease and then underwent surgery as a kid.     OB History          3    Para   3    Term                AB        Living             SAB        IAB        Ectopic        Molar        Multiple        Live Births                   x 3   G1- 1999    G2- 2003    G3- 2005      Current contraception:  s/p tubal ligation   History of abnormal Pap smear: yes- cryotherapy at age 20 years old for abnormal pap smears. 11-12 years ago had abnormal pap smear requiring colposcopy that she was lost to follow up.   Family history of uterine, colon or ovarian cancer: yes - Brother had colon cancer diagnosed at 41 and passed away at 43. Ovarian and cervical cancer of her mother- by age 36 had both cancers requiring hysterectomy and radiation.   History of abnormal mammogram:  n/a  Family history of breast cancer: no  Last Pap : - abnormal pap smear   Last Completed Pap Smear       This patient has no relevant Health Maintenance data.           Last mammogram: n/a   Last Completed Mammogram       This patient has no relevant Health Maintenance data.           Last  colonoscopy:  6/2023- multiple polyps  repeat due in 2024   Last Completed Colonoscopy            COLORECTAL CANCER SCREENING (COLONOSCOPY - Yearly) Next due on 6/12/2024 06/12/2023  COLONOSCOPY    06/12/2023  Surgical Procedure: COLONOSCOPY                     Past Medical History:   Diagnosis Date    Acute cholecystitis 10/22/2016    Acute sinusitis, unspecified     ADHD     NOT MEDICATED    Anxiety disorder, unspecified     Attention and concentration deficit     Bacterial infection due to H. pylori     Cervicalgia     Chills (without fever)     Chronic frontal sinusitis     Chronic low back pain     ER visits for low back pain/ back spasms;MRI L spine    Cystitis, unspecified without hematuria     Dehydration     HOSPITALIZED    Diarrhea, unspecified     Dorsalgia, unspecified     Epigastric pain     Frequency of micturition     GERD (gastroesophageal reflux disease)     Headache     Hematuria, unspecified     Migraine, unspecified, not intractable, without status migrainosus     Nausea     Otalgia, bilateral     Other chronic pain     Other specified noninflammatory disorders of vagina     Papanicolaou smear     last 2014 was normal,  4/2013 was abn but no procedure, +h/o with cryo x 2, (20 yo, 25 yo)    Peripheral neuropathy     Polyneuropathy, unspecified     PONV (postoperative nausea and vomiting)     PUD (peptic ulcer disease)     Radiculopathy, cervical region     Spinal headache     Trochanteric bursitis of both hips 4/5/2022    Trochanteric bursitis, unspecified hip     Unspecified nonsuppurative otitis media, bilateral        Past Surgical History:   Procedure Laterality Date    CHOLECYSTECTOMY      COLONOSCOPY N/A 6/12/2023    Procedure: COLONOSCOPY to cecum with Polypectomy;  Surgeon: Francois Ha MD;  [place] [facility] [place]  Service: Gastroenterology;  Laterality: N/A;  Adequate Prep, Ascending Polyp with a saline left, Hotsnare, Clip x2 Tattoo to ascending Colon ,Ascending polyp with  "biopsy x2 polyps,Transverse Polyp, Descending Polyp x2, Sigmoid Polyp x2, recto-sigmoid Polyp and Rectal polyp x2    ENDOSCOPY N/A 8/30/2021    Procedure: ESOPHAGOGASTRODUODENOSCOPY;  Surgeon: Francois Ha MD;  Location[place] [facility] [place] [facility] [facility];  Service: Gastroenterology;  Laterality: N/A;  irregular z-line, gastritis    OTHER SURGICAL HISTORY      VUR REPAIR AT 12 YO    UTERINE FIBROID SURGERY      while pregnant at 22 yo         Current Outpatient Medications:     baclofen (LIORESAL) 10 MG tablet, Take 1 tablet by mouth 3 (Three) Times a Day As Needed for Muscle Spasms., Disp: 60 tablet, Rfl: 01    gabapentin (NEURONTIN) 300 MG capsule, TAKE 2 CAPSULES BY MOUTH THREE TIMES DAILY, Disp: 180 capsule, Rfl: 5    meloxicam (MOBIC) 15 MG tablet, Take 1 tablet by mouth Daily., Disp: 30 tablet, Rfl: 1    ondansetron ODT (ZOFRAN-ODT) 4 MG disintegrating tablet, Place 1 tablet on the tongue Every 8 (Eight) Hours As Needed for Nausea or Vomiting (motion sickness)., Disp: 15 tablet, Rfl: 0    azithromycin (Zithromax Z-Raul) 250 MG tablet, Take 2 tablets by mouth on day 1, then 1 tablet daily on days 2-5, Disp: 6 tablet, Rfl: 0    No Known Allergies    Social History     Tobacco Use    Smoking status: Former     Types: Cigarettes    Smokeless tobacco: Never    Tobacco comments:     moderate   Vaping Use    Vaping Use: Every day    Substances: Nicotine   Substance Use Topics    Alcohol use: Not Currently    Drug use: Never       Family History   Problem Relation Age of Onset    Drug abuse Father         NOT IN HER LIFE    Ovarian cancer Mother     Colon polyps Mother     Colon cancer Brother     Stroke Paternal Grandmother     Thyroid disease Maternal Grandmother     Stroke Maternal Aunt     Crohn's disease Neg Hx     Irritable bowel syndrome Neg Hx     Ulcerative colitis Neg Hx        Review of Systems   Genitourinary:  Positive for menstrual problem and pelvic pain.       /81   Ht 170.2 cm (67.01\")   Wt 83 kg (183 lb)   " LMP 10/01/2023   BMI 28.66 kg/m²     Physical Exam  Vitals reviewed. Exam conducted with a chaperone present.   Constitutional:       General: She is not in acute distress.  HENT:      Head: Normocephalic and atraumatic.      Right Ear: External ear normal.      Left Ear: External ear normal.   Eyes:      Extraocular Movements: Extraocular movements intact.      Pupils: Pupils are equal, round, and reactive to light.   Cardiovascular:      Rate and Rhythm: Normal rate.   Pulmonary:      Effort: Pulmonary effort is normal. No respiratory distress.   Chest:   Breasts:     Right: No swelling, bleeding, inverted nipple, mass, nipple discharge, skin change or tenderness.      Left: No swelling, bleeding, inverted nipple, mass, nipple discharge, skin change or tenderness.   Abdominal:      General: There is no distension.      Palpations: Abdomen is soft.      Tenderness: There is no abdominal tenderness. There is no guarding or rebound.   Genitourinary:     General: Normal vulva.      Exam position: Lithotomy position.      Labia:         Right: No rash, tenderness, lesion or injury.         Left: No rash, tenderness, lesion or injury.       Urethra: No prolapse or urethral swelling.      Vagina: No vaginal discharge, erythema, tenderness, bleeding or lesions.      Cervix: Lesion (endocervical polyp) present.      Uterus: Not enlarged, not fixed and not tender.       Adnexa:         Right: No mass, tenderness or fullness.          Left: No mass, tenderness or fullness.              Comments: Endocervical polyp noted arising from the cervical canal. Patient gave verbal consent for removal and it was grasped with a ringed forceps and then slowly twisted until it was gentle pulled off. It was then sent to pathology. Hemostasis was achieved with pressure and silver nitrate.   Musculoskeletal:         General: No deformity. Normal range of motion.      Cervical back: Normal range of motion and neck supple.   Lymphadenopathy:       Upper Body:      Right upper body: No supraclavicular or axillary adenopathy.      Left upper body: No supraclavicular or axillary adenopathy.      Lower Body: No right inguinal adenopathy. No left inguinal adenopathy.   Skin:     General: Skin is warm and dry.   Neurological:      General: No focal deficit present.      Mental Status: She is alert and oriented to person, place, and time.   Psychiatric:         Mood and Affect: Mood normal.         Behavior: Behavior normal.            Assessment     1) GYN annual well woman exam.   2) Breast cancer screening  3) Cervical cancer screening  4) History of abnormal pap smear  5) Cervical polyp   6) Asymptomatic microscopic hematuria        Plan     1) Breast Health - Clinical breast exam & mammogram yearly, Self breast awareness monthly. Mammogram ordered for breast cancer screening.   2) Pap - Collected pap smear with HPV cotesting for cervical cancer screening. Will notify the patient of results and need for follow up based on pap smear.   3) Smoking status- Currently vapes nicotine containing substances. Encouraged cessation.   4) Colon health - Patient has family history of colon cancer and had a screening colonoscopy this year that demonstrated multiple polyps. Follow up in 1 year.   5) Contraception- S/p tubal ligation   6) Activity recommends - Adult 150-300 min/week of multi-component physical activities that include balance training, aerobic and physical strengthening.    7) Cervical polyp- Removed today during exam and sent to pathology. This is the likely cause of her abnormal uterine bleeding given previous normal pelvic ultrasound and labs including FSH, LH, prolactin, DHEA, free testosterone, and TSH.   8) Asymptomatic microscopic hematuria- Will send urinanalysis with microscopy for further evaluation of urine sample.   9) Follow up prn and one year      Zenobia Garcia MD

## 2023-10-18 LAB
APPEARANCE UR: CLEAR
BACTERIA #/AREA URNS HPF: NORMAL /[HPF]
BILIRUB UR QL STRIP: NEGATIVE
CASTS URNS QL MICRO: NORMAL /LPF
COLOR UR: YELLOW
EPI CELLS #/AREA URNS HPF: NORMAL /HPF (ref 0–10)
GLUCOSE UR QL STRIP: NEGATIVE
HGB UR QL STRIP: NEGATIVE
KETONES UR QL STRIP: NEGATIVE
LEUKOCYTE ESTERASE UR QL STRIP: ABNORMAL
MICRO URNS: ABNORMAL
NITRITE UR QL STRIP: NEGATIVE
PH UR STRIP: 7.5 [PH] (ref 5–7.5)
PROT UR QL STRIP: NEGATIVE
RBC #/AREA URNS HPF: NORMAL /HPF (ref 0–2)
SP GR UR STRIP: 1.01 (ref 1–1.03)
UROBILINOGEN UR STRIP-MCNC: 0.2 MG/DL (ref 0.2–1)
WBC #/AREA URNS HPF: NORMAL /HPF (ref 0–5)

## 2023-10-19 ENCOUNTER — PATIENT ROUNDING (BHMG ONLY) (OUTPATIENT)
Dept: OBSTETRICS AND GYNECOLOGY | Facility: CLINIC | Age: 43
End: 2023-10-19
Payer: COMMERCIAL

## 2023-10-19 ENCOUNTER — PATIENT MESSAGE (OUTPATIENT)
Dept: OBSTETRICS AND GYNECOLOGY | Facility: CLINIC | Age: 43
End: 2023-10-19
Payer: COMMERCIAL

## 2023-10-19 LAB
DX ICD CODE: NORMAL
PATH REPORT.FINAL DX SPEC: NORMAL
PATH REPORT.GROSS SPEC: NORMAL
PATH REPORT.SITE OF ORIGIN SPEC: NORMAL
PATHOLOGIST NAME: NORMAL
PAYMENT PROCEDURE: NORMAL

## 2023-10-19 NOTE — PROGRESS NOTES
My chart message has been sent to the patient [place] [facility] [place]

## 2023-10-20 LAB
CYTOLOGIST CVX/VAG CYTO: ABNORMAL
CYTOLOGY CVX/VAG DOC CYTO: ABNORMAL
CYTOLOGY CVX/VAG DOC THIN PREP: ABNORMAL
DX ICD CODE: ABNORMAL
DX ICD CODE: ABNORMAL
HIV 1 & 2 AB SER-IMP: ABNORMAL
HPV I/H RISK 4 DNA CVX QL PROBE+SIG AMP: POSITIVE
HPV16 DNA CVX QL PROBE+SIG AMP: POSITIVE
HPV18+45 E6+E7 MRNA CVX QL NAA+PROBE: NEGATIVE
OTHER STN SPEC: ABNORMAL
PATHOLOGIST CVX/VAG CYTO: ABNORMAL
RECOM F/U CVX/VAG CYTO: ABNORMAL
STAT OF ADQ CVX/VAG CYTO-IMP: ABNORMAL

## 2023-10-23 ENCOUNTER — OFFICE VISIT (OUTPATIENT)
Dept: INTERNAL MEDICINE | Facility: CLINIC | Age: 43
End: 2023-10-23
Payer: COMMERCIAL

## 2023-10-23 VITALS
TEMPERATURE: 97.5 F | RESPIRATION RATE: 16 BRPM | DIASTOLIC BLOOD PRESSURE: 74 MMHG | HEART RATE: 77 BPM | OXYGEN SATURATION: 97 % | WEIGHT: 187 LBS | SYSTOLIC BLOOD PRESSURE: 116 MMHG | BODY MASS INDEX: 29.35 KG/M2 | HEIGHT: 67 IN

## 2023-10-23 DIAGNOSIS — M70.61 TROCHANTERIC BURSITIS OF BOTH HIPS: Primary | Chronic | ICD-10-CM

## 2023-10-23 DIAGNOSIS — M70.62 TROCHANTERIC BURSITIS OF BOTH HIPS: Primary | Chronic | ICD-10-CM

## 2023-10-23 RX ORDER — TRIAMCINOLONE ACETONIDE 40 MG/ML
80 INJECTION, SUSPENSION INTRA-ARTICULAR; INTRAMUSCULAR
Status: COMPLETED | OUTPATIENT
Start: 2023-10-23 | End: 2023-10-23

## 2023-10-23 RX ORDER — TRIAMCINOLONE ACETONIDE 40 MG/ML
20 INJECTION, SUSPENSION INTRA-ARTICULAR; INTRAMUSCULAR
Status: COMPLETED | OUTPATIENT
Start: 2023-10-23 | End: 2023-10-23

## 2023-10-23 RX ORDER — TRIAMCINOLONE ACETONIDE 40 MG/ML
40 INJECTION, SUSPENSION INTRA-ARTICULAR; INTRAMUSCULAR
Status: COMPLETED | OUTPATIENT
Start: 2023-10-23 | End: 2023-10-23

## 2023-10-23 RX ADMIN — TRIAMCINOLONE ACETONIDE 20 MG: 40 INJECTION, SUSPENSION INTRA-ARTICULAR; INTRAMUSCULAR at 22:15

## 2023-10-23 RX ADMIN — TRIAMCINOLONE ACETONIDE 40 MG: 40 INJECTION, SUSPENSION INTRA-ARTICULAR; INTRAMUSCULAR at 22:15

## 2023-10-23 RX ADMIN — TRIAMCINOLONE ACETONIDE 80 MG: 40 INJECTION, SUSPENSION INTRA-ARTICULAR; INTRAMUSCULAR at 22:14

## 2023-10-23 NOTE — PROGRESS NOTES
"Chief Complaint  Back Pain and Hip Pain    Subjective          Yadi Syed presents to [facility] for follow-up for her trochanteric bursitis.    She has been having pain associated with trochanteric bursitis for several years. She last had arthrocentesis and steroid injection performed 6/13 for the pain. Since then the pain has gotten progressively worse and she is here for follow-up steroid injection.     Objective   Vital Signs:     /74   Pulse 77   Temp 97.5 °F (36.4 °C)   Resp 16   Ht 170.2 cm (67\")   Wt 84.8 kg (187 lb)   SpO2 97%   BMI 29.29 kg/m²       Physical Exam  Vitals and nursing note reviewed.   Constitutional:       General: She is not in acute distress.     Appearance: Normal appearance.   Cardiovascular:      Rate and Rhythm: Normal rate and regular rhythm.      Pulses: Normal pulses.      Heart sounds: Normal heart sounds. No murmur heard.  Pulmonary:      Effort: Pulmonary effort is normal. No respiratory distress.      Breath sounds: Normal breath sounds.   Abdominal:      General: Abdomen is flat. Bowel sounds are normal.      Palpations: Abdomen is soft.      Tenderness: There is no abdominal tenderness.   Musculoskeletal:      Right lower leg: No edema.      Left lower leg: No edema.   Neurological:      Mental Status: She is alert and oriented to person, place, and time. Mental status is at baseline.   Psychiatric:         Mood and Affect: Mood normal.         Behavior: Behavior normal.          Result Review : : None     Assessment and Plan      Diagnoses and all orders for this visit:    1. Trochanteric bursitis of both hips (Primary)   - repeat joint injections today in both hips   - last done 06/23, reviewed that this cannot be done more frequently than every 3-6 months, did get good relief after last round but effects have worn off at this time   - cont to recommend good stretching at home, ice massage, foam " roller     Orders:  -     Arthrocentesis  -     Arthrocentesis    Arthrocentesis    Date/Time: 10/23/2023 10:14 PM    Performed by: Génesis Azul MD  Authorized by: Génesis Azul MD  Indications: pain   Body area: hip  Joint: right hip  Local anesthesia used: yes    Anesthesia:  Local anesthesia used: yes  Local Anesthetic: lidocaine 2% without epinephrine  Anesthetic total: 2 mL    Sedation:  Patient sedated: no    Preparation: Patient was prepped and draped in the usual sterile fashion.  Needle gauge: 23G.  Ultrasound guidance: no  Approach: lateral  Aspirate amount: 0 mL  Meds administered: 80 mg triamcinolone acetonide 40 MG/ML  Patient tolerance: patient tolerated the procedure well with no immediate complications  Comments: Indication: pain      Arthrocentesis    Date/Time: 10/23/2023 10:15 PM    Performed by: Génesis Azul MD  Authorized by: Génesis Azul MD  Indications: pain   Body area: hip  Joint: left hip  Local anesthesia used: yes    Anesthesia:  Local anesthesia used: yes  Local Anesthetic: lidocaine 2% without epinephrine  Anesthetic total: 2 mL    Sedation:  Patient sedated: no    Preparation: Patient was prepped and draped in the usual sterile fashion.  Needle gauge: 23G.  Ultrasound guidance: no  Approach: superior  Aspirate amount: 0 mL  Meds administered: 40 mg triamcinolone acetonide 40 MG/ML; 20 mg triamcinolone acetonide 40 MG/ML  Comments: Indication: pain          Follow Up   Return if symptoms worsen or fail to improve.    Patient was given instructions and counseling regarding her condition or for health maintenance advice. Please see specific information pulled into the AVS if appropriate.     Lindy Bradford MD    Patient seen and evaluated with Dr. Bradford. Pertinent portions of history and exam repeated. Agree with assessment and plan as above. 44 yo F here for follow up on B/L hip pains. Had done better after last round of steroid injections but pain has  returned, bilateral bursa injections repeated today in office as above. Cont other supportive care and treatment measures including home PT program, icing, stretching, NSAIDS when needed.     Lore Azul MD  [facility]  Phone: [phone]  Fax: [phone]

## 2023-11-08 ENCOUNTER — PROCEDURE VISIT (OUTPATIENT)
Dept: OBSTETRICS AND GYNECOLOGY | Facility: CLINIC | Age: 43
End: 2023-11-08
Payer: COMMERCIAL

## 2023-11-08 ENCOUNTER — TELEPHONE (OUTPATIENT)
Dept: OBSTETRICS AND GYNECOLOGY | Facility: CLINIC | Age: 43
End: 2023-11-08

## 2023-11-08 DIAGNOSIS — Z12.31 VISIT FOR SCREENING MAMMOGRAM: Primary | ICD-10-CM

## 2023-11-08 NOTE — TELEPHONE ENCOUNTER
Pt states she had polyp removed about a month ago. Started cramping around 2 weeks ago, and has been since bleeding for 14 days. Pt says she normally only bleeds for 5 days at most and is requesting provider's opinion.    Please advise,   Thank you!

## 2023-11-10 DIAGNOSIS — R92.8 ABNORMAL MAMMOGRAM: Primary | ICD-10-CM

## 2023-11-14 ENCOUNTER — TELEPHONE (OUTPATIENT)
Dept: OBSTETRICS AND GYNECOLOGY | Facility: CLINIC | Age: 43
End: 2023-11-14

## 2023-11-14 ENCOUNTER — OFFICE VISIT (OUTPATIENT)
Dept: OBSTETRICS AND GYNECOLOGY | Facility: CLINIC | Age: 43
End: 2023-11-14
Payer: COMMERCIAL

## 2023-11-14 VITALS
BODY MASS INDEX: 29.35 KG/M2 | HEIGHT: 67 IN | SYSTOLIC BLOOD PRESSURE: 113 MMHG | WEIGHT: 187 LBS | DIASTOLIC BLOOD PRESSURE: 74 MMHG

## 2023-11-14 DIAGNOSIS — R87.611 ATYPICAL SQUAMOUS CELLS CANNOT EXCLUDE HIGH GRADE SQUAMOUS INTRAEPITHELIAL LESION ON CYTOLOGIC SMEAR OF CERVIX (ASC-H): Primary | ICD-10-CM

## 2023-11-14 DIAGNOSIS — Z98.890 HISTORY OF COLPOSCOPY: ICD-10-CM

## 2023-11-14 DIAGNOSIS — N93.9 ABNORMAL UTERINE BLEEDING (AUB): ICD-10-CM

## 2023-11-14 NOTE — PROGRESS NOTES
Colposcopy and Endometrial Biopsy Procedure Note    Indications:   Pap 10/17/23: ASC-H, HPV 16 positive   She has had previous abnormal pap smears. She has not has a conization or LEEP procedure in the past.   Prior cervical procedures/treatments cryotherapy at age 20 years old for abnormal pap smears. 11-12 years ago had abnormal pap smear requiring colposcopy that she was lost to follow up.      Patient is also to undergo an endometrial biopsy for abnormal uterine bleeding.     History:  Social History     Tobacco Use   Smoking Status Former    Types: Cigarettes   Smokeless Tobacco Never   Tobacco Comments    moderate     Contraception: s/p tubal ligation     Procedure Details   The risks and benefits of the procedure and Verbal informed consent obtained.  Urine pregnancy test was done and was negative    Speculum placed in vagina and excellent visualization of cervix achieved, cervix swabbed x 3 with acetic acid solution.  360degree visualization of squamocolumnar junction visualized. Cervical biopsies performed at 11:00 and 1:00 o'clock. An endocervical curettage was then performed. Hemostasis of cervical biopsies sites was then achieved with pressure and silver nitrate.The cervix was then gently cleansed with betadine.  A Pipelle endometrial aspirator was used to sample the endometrium.  Based on the markings on the pipelle, the uterus sounded to 8 cm. Small amount of sample was obtained and sent for pathologic examination.The speculum was then removed. The patient tolerated procedure well.        Findings:  Cervix: acetowhite lesion across the anterior cervical lip extended from 11:00 to 1:00 o'clock with atypical vessels and sharp border.  endocervical curettage performed and cervical biopsies taken at 11:00 and 1:00 o'clockf  Vaginal inspection: vaginal colposcopy not performed.  Vulvar colposcopy: vulvar colposcopy not performed.    Specimens: Cervical biopsies at 11:00, 1:00 o'clock. ECC, EMB      Complications: none.  Patient tolerated procedure well.    Condition:  Stable    Impression:  JERONIMO II-III  Abnormal uterine bleeding     Plan:  Specimens labelled and sent to Pathology.  Will base further treatment on Pathology findings.  Post biopsy instructions given to patient.The patient was advised to call for any fever or for prolonged or severe pain or bleeding. She was advised to use OTC ibuprofen as needed for mild to moderate pain. She was advised to avoid vaginal intercourse for 48 hours or until the bleeding has completely stopped.  Patient will be called with results.  Patient was instructed if she is not called in 10 days to call the office to discuss results and follow up.      Zenobia Garcia MD

## 2023-11-14 NOTE — TELEPHONE ENCOUNTER
----- Message from Zenobia Garcia MD sent at 11/13/2023 11:51 AM EST -----  Could you work on getting her scheduled for additional imaging? Thanks!

## 2023-11-14 NOTE — TELEPHONE ENCOUNTER
Pt is aware of appt at [facility] 11/21/23 @ 1015 & 11am for DX Bilat Mammo & Bilat Limited US suggested from her screening mammogram.

## 2023-11-21 ENCOUNTER — APPOINTMENT (OUTPATIENT)
Dept: WOMENS IMAGING | Facility: HOSPITAL | Age: 43
End: 2023-11-21
Payer: COMMERCIAL

## 2023-11-21 ENCOUNTER — TELEPHONE (OUTPATIENT)
Dept: OBSTETRICS AND GYNECOLOGY | Facility: CLINIC | Age: 43
End: 2023-11-21
Payer: COMMERCIAL

## 2023-11-21 DIAGNOSIS — N93.9 ABNORMAL UTERINE BLEEDING (AUB): ICD-10-CM

## 2023-11-21 DIAGNOSIS — N84.0 ENDOMETRIAL POLYP: ICD-10-CM

## 2023-11-21 DIAGNOSIS — D06.9 SEVERE DYSPLASIA OF CERVIX (CIN III): Primary | ICD-10-CM

## 2023-11-21 PROCEDURE — 77066 DX MAMMO INCL CAD BI: CPT | Performed by: RADIOLOGY

## 2023-11-21 PROCEDURE — G0279 TOMOSYNTHESIS, MAMMO: HCPCS | Performed by: RADIOLOGY

## 2023-11-21 PROCEDURE — 77062 BREAST TOMOSYNTHESIS BI: CPT | Performed by: RADIOLOGY

## 2023-11-21 PROCEDURE — 76642 ULTRASOUND BREAST LIMITED: CPT | Performed by: RADIOLOGY

## 2023-11-21 NOTE — TELEPHONE ENCOUNTER
Called patient and discussed biopsies results that demonstrated JERONIMO III and an endometrial polyp. I recommended performing cervical conization for treatment of JERONIMO III and reviewed the procedure with risks of infection, bleeding, damage to surrounding structures. We also discussed performing a hysteroscopy, dilation and curettage, polypectomy for endometrial polyp and ongoing AUB and she is aware of risks including bleeding, infection, uterine perforation with damage to surrounding structures. Will place case request and patient is requesting Monday Surgery.     Zenobia Garcia MD

## 2023-11-27 ENCOUNTER — TELEPHONE (OUTPATIENT)
Dept: OBSTETRICS AND GYNECOLOGY | Facility: CLINIC | Age: 43
End: 2023-11-27

## 2023-11-27 ENCOUNTER — TELEPHONE (OUTPATIENT)
Dept: OBSTETRICS AND GYNECOLOGY | Facility: CLINIC | Age: 43
End: 2023-11-27
Payer: COMMERCIAL

## 2023-11-27 DIAGNOSIS — R92.8 ABNORMAL MAMMOGRAM: ICD-10-CM

## 2023-11-27 DIAGNOSIS — R92.8 ABNORMAL MAMMOGRAM: Primary | ICD-10-CM

## 2023-11-27 NOTE — TELEPHONE ENCOUNTER
Called patient and reviewed mammogram results of [id number] with area of right breast requiring stereotactic biopsy. Will place orders and have the patient scheduled for biopsy. All questions and concerns answered.     Zenobia Garcia MD

## 2023-11-27 NOTE — TELEPHONE ENCOUNTER
Patient had a DX mammogram and US done on 11/21/23. Results in chart.    They are recommending a Right Stereotatic bx.    Please review results.  Thanks

## 2023-11-28 PROBLEM — N93.9 ABNORMAL UTERINE BLEEDING (AUB): Status: ACTIVE | Noted: 2023-11-21

## 2023-11-28 PROBLEM — N84.0 ENDOMETRIAL POLYP: Status: ACTIVE | Noted: 2023-11-21

## 2023-11-28 PROBLEM — D06.9 SEVERE DYSPLASIA OF CERVIX (CIN III): Status: ACTIVE | Noted: 2023-11-21

## 2023-12-01 NOTE — TELEPHONE ENCOUNTER
"Pt is aware of appt at [facility] 12/4/23 @ 1245pm for Breast Biopsy.      PT'S RESPONSE WAS \"NO\" TO THE QUESTIONS ABOUT LIDOCAINE, NUMBING AGENTS & WARFIN & ELIQUIS.  Pt informed to bring list of medications, tylenol 5 days out if needed, comfortable clothing & have a light meal before appt.  "

## 2023-12-04 ENCOUNTER — TRANSCRIBE ORDERS (OUTPATIENT)
Dept: LAB | Facility: HOSPITAL | Age: 43
End: 2023-12-04
Payer: COMMERCIAL

## 2023-12-04 ENCOUNTER — APPOINTMENT (OUTPATIENT)
Dept: WOMENS IMAGING | Facility: HOSPITAL | Age: 43
End: 2023-12-04
Payer: COMMERCIAL

## 2023-12-04 ENCOUNTER — LAB REQUISITION (OUTPATIENT)
Dept: LAB | Facility: HOSPITAL | Age: 43
End: 2023-12-04
Payer: COMMERCIAL

## 2023-12-04 DIAGNOSIS — N63.0 LUMP OR MASS IN BREAST: ICD-10-CM

## 2023-12-04 DIAGNOSIS — Z00.00 ENCOUNTER FOR GENERAL ADULT MEDICAL EXAMINATION W/O ABNORMAL FINDINGS: Primary | ICD-10-CM

## 2023-12-04 PROCEDURE — A4648 IMPLANTABLE TISSUE MARKER: HCPCS | Performed by: RADIOLOGY

## 2023-12-04 PROCEDURE — 88305 TISSUE EXAM BY PATHOLOGIST: CPT | Performed by: STUDENT IN AN ORGANIZED HEALTH CARE EDUCATION/TRAINING PROGRAM

## 2023-12-04 PROCEDURE — C1819 TISSUE LOCALIZATION-EXCISION: HCPCS | Performed by: RADIOLOGY

## 2023-12-04 PROCEDURE — 19081 BX BREAST 1ST LESION STRTCTC: CPT | Performed by: RADIOLOGY

## 2023-12-05 ENCOUNTER — ANCILLARY PROCEDURE (OUTPATIENT)
Dept: LAB | Facility: HOSPITAL | Age: 43
End: 2023-12-05
Payer: COMMERCIAL

## 2023-12-05 LAB
DX PRELIMINARY: NORMAL
LAB AP CASE REPORT: NORMAL
PATH REPORT.FINAL DX SPEC: NORMAL
PATH REPORT.GROSS SPEC: NORMAL

## 2023-12-05 PROCEDURE — 76098 X-RAY EXAM SURGICAL SPECIMEN: CPT

## 2023-12-06 ENCOUNTER — TELEPHONE (OUTPATIENT)
Dept: OBSTETRICS AND GYNECOLOGY | Facility: CLINIC | Age: 43
End: 2023-12-06
Payer: COMMERCIAL

## 2023-12-13 DIAGNOSIS — R92.8 ABNORMAL MAMMOGRAM: ICD-10-CM

## 2023-12-14 NOTE — DISCHARGE INSTRUCTIONS
Discharge instructions reviewed include:  - nothing in the vagina for 2 weeks  - contact MD Office with increasing pain, fever (temp of 100.4 or greater), heavy vaginal bleeding, or other concerning symptom  - if you are having a medical emergency, go to the Emergency Department  - vaginal bleeding/spotting is normal, but should get lighter over the next 3-7 days  - mild to moderate cramping is expected.    - do not take more than one type of NSAID (such as mobic, ibuprofen, naproxen).  You may alternate an NSAID and Tylenol (acetaminophen)

## 2023-12-18 ENCOUNTER — LAB (OUTPATIENT)
Dept: LAB | Facility: HOSPITAL | Age: 43
End: 2023-12-18
Payer: COMMERCIAL

## 2023-12-18 ENCOUNTER — TRANSCRIBE ORDERS (OUTPATIENT)
Dept: OBSTETRICS AND GYNECOLOGY | Facility: CLINIC | Age: 43
End: 2023-12-18
Payer: COMMERCIAL

## 2023-12-18 DIAGNOSIS — N93.9 ABNORMAL UTERINE BLEEDING (AUB): Primary | ICD-10-CM

## 2023-12-18 DIAGNOSIS — Z01.812 ENCOUNTER FOR PRE-OPERATIVE LABORATORY TESTING: Primary | ICD-10-CM

## 2023-12-18 LAB
BASOPHILS # BLD AUTO: 0.03 10*3/MM3 (ref 0–0.2)
BASOPHILS NFR BLD AUTO: 0.4 % (ref 0–1.5)
DEPRECATED RDW RBC AUTO: 44 FL (ref 37–54)
EOSINOPHIL # BLD AUTO: 0.1 10*3/MM3 (ref 0–0.4)
EOSINOPHIL NFR BLD AUTO: 1.2 % (ref 0.3–6.2)
ERYTHROCYTE [DISTWIDTH] IN BLOOD BY AUTOMATED COUNT: 13 % (ref 12.3–15.4)
HCT VFR BLD AUTO: 44.7 % (ref 34–46.6)
HGB BLD-MCNC: 14.6 G/DL (ref 12–15.9)
IMM GRANULOCYTES # BLD AUTO: 0.03 10*3/MM3 (ref 0–0.05)
IMM GRANULOCYTES NFR BLD AUTO: 0.4 % (ref 0–0.5)
LYMPHOCYTES # BLD AUTO: 2.59 10*3/MM3 (ref 0.7–3.1)
LYMPHOCYTES NFR BLD AUTO: 31.3 % (ref 19.6–45.3)
MCH RBC QN AUTO: 30.7 PG (ref 26.6–33)
MCHC RBC AUTO-ENTMCNC: 32.7 G/DL (ref 31.5–35.7)
MCV RBC AUTO: 93.9 FL (ref 79–97)
MONOCYTES # BLD AUTO: 0.7 10*3/MM3 (ref 0.1–0.9)
MONOCYTES NFR BLD AUTO: 8.5 % (ref 5–12)
NEUTROPHILS NFR BLD AUTO: 4.83 10*3/MM3 (ref 1.7–7)
NEUTROPHILS NFR BLD AUTO: 58.2 % (ref 42.7–76)
NRBC BLD AUTO-RTO: 0 /100 WBC (ref 0–0.2)
PLATELET # BLD AUTO: 356 10*3/MM3 (ref 140–450)
PMV BLD AUTO: 8.9 FL (ref 6–12)
RBC # BLD AUTO: 4.76 10*6/MM3 (ref 3.77–5.28)
WBC NRBC COR # BLD AUTO: 8.28 10*3/MM3 (ref 3.4–10.8)

## 2023-12-18 PROCEDURE — 36415 COLL VENOUS BLD VENIPUNCTURE: CPT | Performed by: STUDENT IN AN ORGANIZED HEALTH CARE EDUCATION/TRAINING PROGRAM

## 2023-12-18 PROCEDURE — 85025 COMPLETE CBC W/AUTO DIFF WBC: CPT | Performed by: STUDENT IN AN ORGANIZED HEALTH CARE EDUCATION/TRAINING PROGRAM

## 2023-12-19 RX ORDER — LANOLIN ALCOHOL/MO/W.PET/CERES
1000 CREAM (GRAM) TOPICAL DAILY
COMMUNITY

## 2023-12-19 RX ORDER — CHLORAL HYDRATE 500 MG
CAPSULE ORAL
COMMUNITY

## 2023-12-19 RX ORDER — MULTIPLE VITAMINS W/ MINERALS TAB 9MG-400MCG
1 TAB ORAL DAILY
COMMUNITY

## 2023-12-20 ENCOUNTER — HOSPITAL ENCOUNTER (OUTPATIENT)
Age: 43
Setting detail: HOSPITAL OUTPATIENT SURGERY
Discharge: HOME | End: 2023-12-20
Attending: STUDENT IN AN ORGANIZED HEALTH CARE EDUCATION/TRAINING PROGRAM | Admitting: STUDENT IN AN ORGANIZED HEALTH CARE EDUCATION/TRAINING PROGRAM
Payer: COMMERCIAL

## 2023-12-20 ENCOUNTER — ANESTHESIA (OUTPATIENT)
Age: 43
End: 2023-12-20
Payer: COMMERCIAL

## 2023-12-20 ENCOUNTER — ANESTHESIA EVENT (OUTPATIENT)
Age: 43
End: 2023-12-20
Payer: COMMERCIAL

## 2023-12-20 VITALS
RESPIRATION RATE: 18 BRPM | HEART RATE: 71 BPM | WEIGHT: 170 LBS | SYSTOLIC BLOOD PRESSURE: 109 MMHG | BODY MASS INDEX: 26.68 KG/M2 | DIASTOLIC BLOOD PRESSURE: 76 MMHG | TEMPERATURE: 97.8 F | HEIGHT: 67 IN | OXYGEN SATURATION: 100 %

## 2023-12-20 DIAGNOSIS — D06.9 SEVERE DYSPLASIA OF CERVIX (CIN III): ICD-10-CM

## 2023-12-20 DIAGNOSIS — N93.9 ABNORMAL UTERINE BLEEDING (AUB): ICD-10-CM

## 2023-12-20 DIAGNOSIS — N84.0 ENDOMETRIAL POLYP: ICD-10-CM

## 2023-12-20 PROCEDURE — 25010000002 ROPIVACAINE PER 1 MG: Performed by: STUDENT IN AN ORGANIZED HEALTH CARE EDUCATION/TRAINING PROGRAM

## 2023-12-20 PROCEDURE — S0260 H&P FOR SURGERY: HCPCS | Performed by: STUDENT IN AN ORGANIZED HEALTH CARE EDUCATION/TRAINING PROGRAM

## 2023-12-20 PROCEDURE — 25010000002 PROPOFOL 10 MG/ML EMULSION: Performed by: STUDENT IN AN ORGANIZED HEALTH CARE EDUCATION/TRAINING PROGRAM

## 2023-12-20 PROCEDURE — 88305 TISSUE EXAM BY PATHOLOGIST: CPT | Performed by: STUDENT IN AN ORGANIZED HEALTH CARE EDUCATION/TRAINING PROGRAM

## 2023-12-20 PROCEDURE — 25010000002 FENTANYL CITRATE (PF) 50 MCG/ML SOLUTION: Performed by: STUDENT IN AN ORGANIZED HEALTH CARE EDUCATION/TRAINING PROGRAM

## 2023-12-20 PROCEDURE — 25010000002 DEXAMETHASONE PER 1 MG: Performed by: STUDENT IN AN ORGANIZED HEALTH CARE EDUCATION/TRAINING PROGRAM

## 2023-12-20 PROCEDURE — 25810000003 LACTATED RINGERS PER 1000 ML: Performed by: STUDENT IN AN ORGANIZED HEALTH CARE EDUCATION/TRAINING PROGRAM

## 2023-12-20 PROCEDURE — C1782 MORCELLATOR: HCPCS | Performed by: STUDENT IN AN ORGANIZED HEALTH CARE EDUCATION/TRAINING PROGRAM

## 2023-12-20 PROCEDURE — 25010000002 ONDANSETRON PER 1 MG: Performed by: STUDENT IN AN ORGANIZED HEALTH CARE EDUCATION/TRAINING PROGRAM

## 2023-12-20 PROCEDURE — 58558 HYSTEROSCOPY BIOPSY: CPT | Performed by: STUDENT IN AN ORGANIZED HEALTH CARE EDUCATION/TRAINING PROGRAM

## 2023-12-20 PROCEDURE — 81025 URINE PREGNANCY TEST: CPT | Performed by: STUDENT IN AN ORGANIZED HEALTH CARE EDUCATION/TRAINING PROGRAM

## 2023-12-20 PROCEDURE — 88307 TISSUE EXAM BY PATHOLOGIST: CPT | Performed by: STUDENT IN AN ORGANIZED HEALTH CARE EDUCATION/TRAINING PROGRAM

## 2023-12-20 RX ORDER — DIPHENHYDRAMINE HYDROCHLORIDE 50 MG/ML
12.5 INJECTION INTRAMUSCULAR; INTRAVENOUS
Status: DISCONTINUED | OUTPATIENT
Start: 2023-12-20 | End: 2023-12-20 | Stop reason: HOSPADM

## 2023-12-20 RX ORDER — HYDROMORPHONE HYDROCHLORIDE 1 MG/ML
0.5 INJECTION, SOLUTION INTRAMUSCULAR; INTRAVENOUS; SUBCUTANEOUS
Status: DISCONTINUED | OUTPATIENT
Start: 2023-12-20 | End: 2023-12-20 | Stop reason: HOSPADM

## 2023-12-20 RX ORDER — MIDAZOLAM HYDROCHLORIDE 1 MG/ML
1 INJECTION INTRAMUSCULAR; INTRAVENOUS
Status: DISCONTINUED | OUTPATIENT
Start: 2023-12-20 | End: 2023-12-20 | Stop reason: HOSPADM

## 2023-12-20 RX ORDER — FAMOTIDINE 10 MG/ML
20 INJECTION, SOLUTION INTRAVENOUS ONCE
Qty: 2 ML | Refills: 0 | Status: COMPLETED | OUTPATIENT
Start: 2023-12-20 | End: 2023-12-20

## 2023-12-20 RX ORDER — SCOLOPAMINE TRANSDERMAL SYSTEM 1 MG/1
1 PATCH, EXTENDED RELEASE TRANSDERMAL ONCE
Status: DISCONTINUED | OUTPATIENT
Start: 2023-12-20 | End: 2023-12-20 | Stop reason: HOSPADM

## 2023-12-20 RX ORDER — FENTANYL CITRATE 50 UG/ML
50 INJECTION, SOLUTION INTRAMUSCULAR; INTRAVENOUS
Status: DISCONTINUED | OUTPATIENT
Start: 2023-12-20 | End: 2023-12-20 | Stop reason: HOSPADM

## 2023-12-20 RX ORDER — DROPERIDOL 2.5 MG/ML
0.62 INJECTION, SOLUTION INTRAMUSCULAR; INTRAVENOUS
Status: DISCONTINUED | OUTPATIENT
Start: 2023-12-20 | End: 2023-12-20 | Stop reason: HOSPADM

## 2023-12-20 RX ORDER — HYDRALAZINE HYDROCHLORIDE 20 MG/ML
5 INJECTION INTRAMUSCULAR; INTRAVENOUS
Status: DISCONTINUED | OUTPATIENT
Start: 2023-12-20 | End: 2023-12-20 | Stop reason: HOSPADM

## 2023-12-20 RX ORDER — DEXAMETHASONE SODIUM PHOSPHATE 4 MG/ML
INJECTION, SOLUTION INTRA-ARTICULAR; INTRALESIONAL; INTRAMUSCULAR; INTRAVENOUS; SOFT TISSUE AS NEEDED
Status: DISCONTINUED | OUTPATIENT
Start: 2023-12-20 | End: 2023-12-20 | Stop reason: SURG

## 2023-12-20 RX ORDER — MORPHINE SULFATE 2 MG/ML
5 INJECTION, SOLUTION INTRAMUSCULAR; INTRAVENOUS
Status: DISCONTINUED | OUTPATIENT
Start: 2023-12-20 | End: 2023-12-20 | Stop reason: HOSPADM

## 2023-12-20 RX ORDER — SODIUM CHLORIDE 0.9 % (FLUSH) 0.9 %
3 SYRINGE (ML) INJECTION EVERY 12 HOURS SCHEDULED
Status: DISCONTINUED | OUTPATIENT
Start: 2023-12-20 | End: 2023-12-20 | Stop reason: HOSPADM

## 2023-12-20 RX ORDER — IBUPROFEN 800 MG/1
800 TABLET ORAL EVERY 8 HOURS PRN
Qty: 30 TABLET | Refills: 0 | Status: SHIPPED | OUTPATIENT
Start: 2023-12-20

## 2023-12-20 RX ORDER — PROMETHAZINE HYDROCHLORIDE 25 MG/1
25 SUPPOSITORY RECTAL ONCE AS NEEDED
Status: DISCONTINUED | OUTPATIENT
Start: 2023-12-20 | End: 2023-12-20 | Stop reason: HOSPADM

## 2023-12-20 RX ORDER — HYDROCODONE BITARTRATE AND ACETAMINOPHEN 5; 325 MG/1; MG/1
1 TABLET ORAL ONCE AS NEEDED
Status: DISCONTINUED | OUTPATIENT
Start: 2023-12-20 | End: 2023-12-20 | Stop reason: HOSPADM

## 2023-12-20 RX ORDER — OXYCODONE AND ACETAMINOPHEN 7.5; 325 MG/1; MG/1
1 TABLET ORAL EVERY 4 HOURS PRN
Status: DISCONTINUED | OUTPATIENT
Start: 2023-12-20 | End: 2023-12-20 | Stop reason: HOSPADM

## 2023-12-20 RX ORDER — LABETALOL HYDROCHLORIDE 5 MG/ML
5 INJECTION, SOLUTION INTRAVENOUS
Status: DISCONTINUED | OUTPATIENT
Start: 2023-12-20 | End: 2023-12-20 | Stop reason: HOSPADM

## 2023-12-20 RX ORDER — MAGNESIUM HYDROXIDE 1200 MG/15ML
LIQUID ORAL AS NEEDED
Status: DISCONTINUED | OUTPATIENT
Start: 2023-12-20 | End: 2023-12-20 | Stop reason: HOSPADM

## 2023-12-20 RX ORDER — ONDANSETRON 2 MG/ML
4 INJECTION INTRAMUSCULAR; INTRAVENOUS ONCE AS NEEDED
Status: DISCONTINUED | OUTPATIENT
Start: 2023-12-20 | End: 2023-12-20 | Stop reason: HOSPADM

## 2023-12-20 RX ORDER — FENTANYL CITRATE 50 UG/ML
INJECTION, SOLUTION INTRAMUSCULAR; INTRAVENOUS AS NEEDED
Status: DISCONTINUED | OUTPATIENT
Start: 2023-12-20 | End: 2023-12-20 | Stop reason: SURG

## 2023-12-20 RX ORDER — LIDOCAINE HYDROCHLORIDE 20 MG/ML
INJECTION, SOLUTION INFILTRATION; PERINEURAL AS NEEDED
Status: DISCONTINUED | OUTPATIENT
Start: 2023-12-20 | End: 2023-12-20 | Stop reason: SURG

## 2023-12-20 RX ORDER — PROMETHAZINE HYDROCHLORIDE 12.5 MG/1
25 TABLET ORAL ONCE AS NEEDED
Status: DISCONTINUED | OUTPATIENT
Start: 2023-12-20 | End: 2023-12-20 | Stop reason: HOSPADM

## 2023-12-20 RX ORDER — ROPIVACAINE HYDROCHLORIDE 5 MG/ML
INJECTION, SOLUTION EPIDURAL; INFILTRATION; PERINEURAL AS NEEDED
Status: DISCONTINUED | OUTPATIENT
Start: 2023-12-20 | End: 2023-12-20 | Stop reason: HOSPADM

## 2023-12-20 RX ORDER — NALOXONE HCL 0.4 MG/ML
0.2 VIAL (ML) INJECTION AS NEEDED
Status: DISCONTINUED | OUTPATIENT
Start: 2023-12-20 | End: 2023-12-20 | Stop reason: HOSPADM

## 2023-12-20 RX ORDER — SODIUM CHLORIDE 0.9 % (FLUSH) 0.9 %
3-10 SYRINGE (ML) INJECTION AS NEEDED
Status: DISCONTINUED | OUTPATIENT
Start: 2023-12-20 | End: 2023-12-20 | Stop reason: HOSPADM

## 2023-12-20 RX ORDER — SODIUM CHLORIDE, SODIUM LACTATE, POTASSIUM CHLORIDE, CALCIUM CHLORIDE 600; 310; 30; 20 MG/100ML; MG/100ML; MG/100ML; MG/100ML
9 INJECTION, SOLUTION INTRAVENOUS CONTINUOUS
Status: DISCONTINUED | OUTPATIENT
Start: 2023-12-20 | End: 2023-12-20 | Stop reason: HOSPADM

## 2023-12-20 RX ORDER — ONDANSETRON 2 MG/ML
INJECTION INTRAMUSCULAR; INTRAVENOUS AS NEEDED
Status: DISCONTINUED | OUTPATIENT
Start: 2023-12-20 | End: 2023-12-20 | Stop reason: SURG

## 2023-12-20 RX ORDER — PROPOFOL 10 MG/ML
VIAL (ML) INTRAVENOUS AS NEEDED
Status: DISCONTINUED | OUTPATIENT
Start: 2023-12-20 | End: 2023-12-20 | Stop reason: SURG

## 2023-12-20 RX ORDER — FLUMAZENIL 0.1 MG/ML
0.2 INJECTION INTRAVENOUS AS NEEDED
Status: DISCONTINUED | OUTPATIENT
Start: 2023-12-20 | End: 2023-12-20 | Stop reason: HOSPADM

## 2023-12-20 RX ORDER — IODINE SOLUTION STRONG 5% (LUGOL'S) 5 %
SOLUTION ORAL AS NEEDED
Status: DISCONTINUED | OUTPATIENT
Start: 2023-12-20 | End: 2023-12-20 | Stop reason: HOSPADM

## 2023-12-20 RX ADMIN — SODIUM CHLORIDE, POTASSIUM CHLORIDE, SODIUM LACTATE AND CALCIUM CHLORIDE 9 ML/HR: 600; 310; 30; 20 INJECTION, SOLUTION INTRAVENOUS at 07:52

## 2023-12-20 RX ADMIN — LIDOCAINE HYDROCHLORIDE 40 MG: 20 INJECTION, SOLUTION INFILTRATION; PERINEURAL at 08:38

## 2023-12-20 RX ADMIN — PROPOFOL 25 MCG/KG/MIN: 10 INJECTION, EMULSION INTRAVENOUS at 08:56

## 2023-12-20 RX ADMIN — PROPOFOL 200 MG: 10 INJECTION, EMULSION INTRAVENOUS at 08:38

## 2023-12-20 RX ADMIN — FENTANYL CITRATE 50 MCG: 50 INJECTION INTRAMUSCULAR; INTRAVENOUS at 10:19

## 2023-12-20 RX ADMIN — SCOPALAMINE 1 PATCH: 1 PATCH, EXTENDED RELEASE TRANSDERMAL at 07:53

## 2023-12-20 RX ADMIN — ONDANSETRON 4 MG: 2 INJECTION INTRAMUSCULAR; INTRAVENOUS at 08:42

## 2023-12-20 RX ADMIN — OXYCODONE HYDROCHLORIDE AND ACETAMINOPHEN 1 TABLET: 7.5; 325 TABLET ORAL at 10:25

## 2023-12-20 RX ADMIN — FENTANYL CITRATE 50 MCG: 50 INJECTION, SOLUTION INTRAMUSCULAR; INTRAVENOUS at 08:44

## 2023-12-20 RX ADMIN — DEXAMETHASONE SODIUM PHOSPHATE 8 MG: 4 INJECTION, SOLUTION INTRA-ARTICULAR; INTRALESIONAL; INTRAMUSCULAR; INTRAVENOUS; SOFT TISSUE at 08:42

## 2023-12-20 RX ADMIN — FAMOTIDINE 20 MG: 10 INJECTION INTRAVENOUS at 08:14

## 2023-12-20 RX ADMIN — FENTANYL CITRATE 50 MCG: 50 INJECTION INTRAMUSCULAR; INTRAVENOUS at 10:25

## 2023-12-20 NOTE — OP NOTE
OPERATIVE REPORT     Pre-Operative Diagnosis: Severe cervical dysplasia (JERONIMO III), abnormal uterine bleeding, endometrial polyp     Post-Operative Diagnosis: Severe cervical dysplasia (JERONIMO III), abnormal uterine bleeding      Procedure: Hysteroscopy, Dilation and Curettage, Endometrial Resection with Myosure Lite, Cervical Conization      Surgeon: Zenobia Garcia MD    Anesthetic: GETA     Estimated Blood Loss: 50 cc      Complications: no complications were noted.     Disposition: PACU - hemodynamically stable. Anticipate discharge home.      Findings: Cervix appeared parous with approximately 1.5 cm firm mass in the anterior lip at the transition zone. Upon Lugol's staining, there was a non-staining area at 7:00-8:00 o'clock and th area around the anterior cervical lip mass was non-staining from 11:00-1:00 o'clock.   Hysteroscopic findings: Normal uterine cavity shape with thickened polypoid endometrium noted along the left wall of the uterus and extended anteriorly and posteriorly around the mid uterus. Bilateral tubal ostia visualized.     Indications for surgery: Yadi Syed is a 43 y.o. [id number] presented for surgical management of JERONIMO III and abnormal uterine bleeding and endometrial polyp. Options for management were reviewed with patient and she decided to proceed with cervical conization, operative hysteroscopy, dilation and curettage, polypectomy. Risks, benefits, alternatives, and complications were discussed in detail. Patient's questions were answered to her satisfaction prior to the procedure.     Details of Procedure:   The patient was taken to the operating room and positioned supine. General anesthesia was then administered. She was positioned in dorsal lithotomy in candy cane stirrups and prepped and draped in usual sterile fashion. A timeout was performed.  An open sided speculum was placed into the vaginal and the cervix was visualized. The cervix was grasped on the anterior lip and easily  dilated to accommodate the 0 degree operative hysteroscope. The hysteroscope was then passed into the uterine cavity and distension revealed the aforementioned findings. The Myosure Lite device was then passed into the operative hysteroscope. The Myosure Lite was used to resected the thickened, polypoid endometrium along the left side of the uterus that extended anteriorly and posteriorly around mid uterus. The cavity was noted to be smooth in contour upon completion. The hysteroscope was then removed and a smooth sharp curettage was performed with small amount of currettings. The tenaculum was removed from the cervix and hemostasis of the tenaculum sites was obtained with pressure.    Attention was then turned to performing cervical conization. Her cervix was inspected and 16 mL of 0.5% ropivacaine was injected at 2:00, 4:00, 8:00, 10:00 o'clock to anesthetize for a paracervical block. Stay sutures using 0 Vicryl were placed at 3 and 9 o'clock position on the cervix. The cervix was then stained with Lugol's and findings noted above. A cone-shaped specimen was removed with the scalpel to incorporate the non-staining areas of Lugol's solution. The Bovie was then utilized to cauterize bleeding of cervical bed. The cone biopsy site was suture using 2-0 Vicryl in a running locked fashion. Upon completion of the suture placement, the endocervical canal was sounded to assure patency. A prophylactic application of Monsel's solution was applied. The patient was then positioned supine and awoken from general anesthesia and taken to the PACU in good condition. Pictures were taken throughout. Final sponge, needle, and instrument counts were correct.     Zenobia Garcia MD

## 2023-12-20 NOTE — PERIOPERATIVE NURSING NOTE
Patient voided very small amount in PACU.  RN encouraged patient to try and void again upon arrival to Phase II and prior to discharge. Patient refused. Pt. instructed to notify MD if unable to void by 1500 or pain worsens or become intolerable. Patient and  verbalized understanding.

## 2023-12-20 NOTE — H&P
Gynecology   H&P Note    Patient Identification:  Name: Yadi Syed  Age: 43 y.o.  Sex: female  :  1980  MRN: [id number]                       Chief Complaint:  Scheduled surgery    History of Present Illness:   Yadi Syed is a 43 y.o.  who presents for surgical management of JERONIMO III, abnormal uterine bleeding, and benign endometrial polyp with cervical conization, hysteroscopy, dilation and curettage, polypectomy. She was diagnosed with JERONIMO III following colposcopy on 23. She was also diagnosed with endometrial polyp on endometrial biopsy that same day and has had bleeding since mid October that stopped only just 3 days ago. She is ready to proceed with surgical management.     Past Medical History:  Past Medical History:   Diagnosis Date    Acute cholecystitis 10/22/2016    Acute sinusitis, unspecified     ADHD     NOT MEDICATED    Anxiety disorder, unspecified     Attention and concentration deficit     Bacterial infection due to H. pylori     Cervicalgia     Chills (without fever)     Chronic frontal sinusitis     Chronic low back pain     ER visits for low back pain/ back spasms;MRI L spine    Cystitis, unspecified without hematuria     Dehydration     HOSPITALIZED    Diarrhea, unspecified     Dorsalgia, unspecified     Epigastric pain     Frequency of micturition     GERD (gastroesophageal reflux disease)     Headache     Hematuria, unspecified     Migraine, unspecified, not intractable, without status migrainosus     Nausea     Otalgia, bilateral     Other chronic pain     Other specified noninflammatory disorders of vagina     Papanicolaou smear     last  was normal,  2013 was abn but no procedure, +h/o with cryo x 2, (22 yo, 27 yo)    Peripheral neuropathy     Polyneuropathy, unspecified     PONV (postoperative nausea and vomiting)     PUD (peptic ulcer disease)     Radiculopathy, cervical region     Spinal headache     Trochanteric bursitis of both hips 2022    Trochanteric  bursitis, unspecified hip     Unspecified nonsuppurative otitis media, bilateral      Past Surgical History:  Past Surgical History:   Procedure Laterality Date    BREAST BIOPSY      CHOLECYSTECTOMY      COLONOSCOPY N/A 06/12/2023    Procedure: COLONOSCOPY to cecum with Polypectomy;  Surgeon: Francois Ha MD;  [place] [facility];  Service: Gastroenterology;  Laterality: N/A;  Adequate Prep, Ascending Polyp with a saline left, [place], Clip x2 Tattoo to ascending Colon ,Ascending polyp with biopsy x2 polyps,Transverse Polyp, Descending Polyp x2, Sigmoid Polyp x2, recto-sigmoid Polyp and Rectal polyp x2    ENDOSCOPY N/A 08/30/2021    Procedure: ESOPHAGOGASTRODUODENOSCOPY;  Surgeon: Francois Ha MD;  [place] [facility];  Service: Gastroenterology;  Laterality: N/A;  irregular z-line, gastritis    OTHER SURGICAL HISTORY      VUR REPAIR AT 12 YO    UTERINE FIBROID SURGERY      while pregnant at 24 yo      Home Meds:  Medications Prior to Admission   Medication Sig Dispense Refill Last Dose    gabapentin (NEURONTIN) 300 MG capsule TAKE 2 CAPSULES BY MOUTH THREE TIMES DAILY 180 capsule 5 12/19/2023 at 2000    multivitamin with minerals tablet tablet Take 1 tablet by mouth Daily.   Past Month    Omega-3 Fatty Acids (fish oil) 1000 MG capsule capsule Take  by mouth Daily With Breakfast.   Past Month    ondansetron ODT (ZOFRAN-ODT) 4 MG disintegrating tablet Place 1 tablet on the tongue Every 8 (Eight) Hours As Needed for Nausea or Vomiting (motion sickness). 15 tablet 0 12/19/2023 at 1000    vitamin B-12 (CYANOCOBALAMIN) 1000 MCG tablet Take 1 tablet by mouth Daily.   Past Month    baclofen (LIORESAL) 10 MG tablet Take 1 tablet by mouth 3 (Three) Times a Day As Needed for Muscle Spasms. 60 tablet 01 More than a month    meloxicam (MOBIC) 15 MG tablet Take 1 tablet by mouth once daily 30 tablet 0 More than a month     Current Meds:   Current Facility-Administered Medications   Medication Dose Route  Frequency Provider Last Rate Last Admin    diphenhydrAMINE (BENADRYL) injection 12.5 mg  12.5 mg Intravenous Q15 Min PRN Robert Avila MD        droperidol (INAPSINE) injection 0.625 mg  0.625 mg Intravenous Q20 Min PRN Robert Avila MD        Or    droperidol (INAPSINE) injection 0.625 mg  0.625 mg Intramuscular Q20 Min PRN Robert Avila MD        fentaNYL citrate (PF) (SUBLIMAZE) injection 50 mcg  50 mcg Intravenous Q5 Min PRN Robert Avila MD        flumazenil (ROMAZICON) injection 0.2 mg  0.2 mg Intravenous PRN Robert Avila MD        hydrALAZINE (APRESOLINE) injection 5 mg  5 mg Intravenous Q10 Min PRN Robert Avila MD        HYDROcodone-acetaminophen (NORCO) 5-325 MG per tablet 1 tablet  1 tablet Oral Once PRN Robert Avila MD        HYDROmorphone (DILAUDID) injection 0.5 mg  0.5 mg Intravenous Q5 Min PRN Robert Avila MD        labetalol (NORMODYNE,TRANDATE) injection 5 mg  5 mg Intravenous Q5 Min PRN Robert Avila MD        lactated ringers infusion  9 mL/hr Intravenous Continuous Robert Avila MD 9 mL/hr at 12/20/23 0752 9 mL/hr at 12/20/23 0752    midazolam (VERSED) injection 1 mg  1 mg Intravenous Q5 Min PRN Robert Avila MD        naloxone (NARCAN) injection 0.2 mg  0.2 mg Intravenous PRN Robert Avila MD        ondansetron (ZOFRAN) injection 4 mg  4 mg Intravenous Once PRN Robert Avila MD        oxyCODONE-acetaminophen (PERCOCET) 7.5-325 MG per tablet 1 tablet  1 tablet Oral Q4H PRN Robert Avlia MD        promethazine (PHENERGAN) suppository 25 mg  25 mg Rectal Once PRN Robert Avila MD        Or    promethazine (PHENERGAN) tablet 25 mg  25 mg Oral Once PRN Robert Avila MD        scopolamine patch 1 mg/72 hr  1 patch Transdermal Once Robert Avila MD   1 patch at 12/20/23 0753    sodium chloride 0.9 % flush 3 mL  3 mL Intravenous Q12H Robert Avila MD        sodium chloride 0.9 % flush 3-10 mL   3-10 mL Intravenous PRN Robert Avila MD           Allergies:  No Known Allergies  Immunizations:  Immunization History   Administered Date(s) Administered    Tdap 2017     Social History:   Social History     Tobacco Use    Smoking status: Former     Types: Cigarettes    Smokeless tobacco: Never    Tobacco comments:     moderate   Substance Use Topics    Alcohol use: Not Currently      Family History:  Family History   Problem Relation Age of Onset    Ovarian cancer Mother     Colon polyps Mother     Drug abuse Father         NOT IN HER LIFE    Colon cancer Brother     Stroke Maternal Aunt     Thyroid disease Maternal Grandmother     Stroke Paternal Grandmother     Crohn's disease Neg Hx     Irritable bowel syndrome Neg Hx     Ulcerative colitis Neg Hx     Malig Hyperthermia Neg Hx         Review of Systems  Pertinent items are noted in HPI.    Objective:  tMax 24 hrs: Temp (24hrs), Av.2 °F (36.8 °C), Min:98.2 °F (36.8 °C), Max:98.2 °F (36.8 °C)    Vitals Ranges:   Temp:  [98.2 °F (36.8 °C)] 98.2 °F (36.8 °C)  Heart Rate:  [84] 84  Resp:  [16] 16  BP: (114)/(85) 114/85  Intake and Output Last 3 Shifts:   No intake/output data recorded.    Exam:     General Appearance:    Alert, cooperative, no distress, appears stated age   Head:    Normocephalic, without obvious abnormality, atraumatic   Back:     Symmetric, no curvature, ROM normal   Lungs:     No increased work of breathing, regular respirations    Abdomen:     Soft, non-tender, bowel sounds active all four quadrants,     no masses, no organomegaly   Extremities:   Extremities normal, atraumatic, no cyanosis or edema   Skin:   Skin color, texture, turgor normal, no rashes or lesions       Data Review:    Lab Results (last 24 hours)       Procedure Component Value Units Date/Time    POC Urine Pregnancy [[id number]]  (Normal) Collected: 23 07    Specimen: Urine Updated: 23 075     HCG, Urine, QL Negative     Lot Number [id number]      Internal Positive Control Positive     Internal Negative Control Negative     Expiration Date 4-8-25          Assessment:    Severe dysplasia of cervix (JERONIMO III)    Abnormal uterine bleeding (AUB)    Endometrial polyp    Plan:  Reviewed planned procedure of cervical conization for cervical dysplasia and hysteroscopy, dilation and curettage, and polypectomy for AUB and endometrial polyp. Reviewed risks including but not limited to bleeding, infection, damage to surrounding structures that may require additional procedures, fluid overload, and electrolyte abnormalities. Consents signed. No antibiotics indicated. SCDs ordered for DVT prophylaxis. Discussed discharge instructions and call precautions following surgery. Will have the patient follow up in 2 weeks. All questions answered. Proceed to OR for planned procedure.     Zenobia Garcia MD  12/20/2023

## 2023-12-20 NOTE — ANESTHESIA PREPROCEDURE EVALUATION
Anesthesia Evaluation     Patient summary reviewed and Nursing notes reviewed   history of anesthetic complications:  PONV  NPO Solid Status: > 8 hours  NPO Liquid Status: > 2 hours           Airway   Mallampati: II  TM distance: >3 FB  Neck ROM: full  Dental      Pulmonary    Cardiovascular - negative cardio ROS        Neuro/Psych  (+) headaches, numbness, psychiatric history ADHD  GI/Hepatic/Renal/Endo    (+) GERD well controlled, PUD    Musculoskeletal     (+) neck pain  Abdominal    Substance History - negative use     OB/GYN negative ob/gyn ROS         Other - negative ROS                         Anesthesia Plan    ASA 2     general     intravenous induction     Anesthetic plan, risks, benefits, and alternatives have been provided, discussed and informed consent has been obtained with: patient.        CODE STATUS:

## 2023-12-20 NOTE — ANESTHESIA POSTPROCEDURE EVALUATION
Patient: Yadi Syed    Procedure Summary       Date: 12/20/23 Room / Location: [facility] OR [address] [place] [address] MAIN OR    Anesthesia Start: 0832 Anesthesia Stop: 1016    Procedures:       HYSTEROSCOPY, DILATION AND CURETTAGE, endometrial resection WITH MYOSURE (Uterus)      CERVICAL CONIZATION (Cervix) Diagnosis:       Severe dysplasia of cervix (JERONIMO III)      Abnormal uterine bleeding (AUB)      Endometrial polyp      (Severe dysplasia of cervix (JERONIMO III) [D06.9])      (Abnormal uterine bleeding (AUB) [N93.9])      (Endometrial polyp [N84.0])    Surgeons: Zenobia Garcia MD Provider: Robert Avila MD    Anesthesia Type: general ASA Status: 2            Anesthesia Type: general    Vitals  Vitals Value Taken Time   /70 12/20/23 1049   Temp 36.6 °C (97.8 °F) 12/20/23 1045   Pulse 68 12/20/23 1051   Resp 18 12/20/23 1045   SpO2 100 % 12/20/23 1051   Vitals shown include unfiled device data.        Post Anesthesia Care and Evaluation    Level of consciousness: awake and alert  Pain management: adequate    Airway patency: patent  Anesthetic complications: No anesthetic complications  PONV Status: controlled  Cardiovascular status: blood pressure returned to baseline and acceptable  Respiratory status: acceptable  Hydration status: acceptable

## 2023-12-21 ENCOUNTER — TELEPHONE (OUTPATIENT)
Dept: OBSTETRICS AND GYNECOLOGY | Facility: CLINIC | Age: 43
End: 2023-12-21
Payer: COMMERCIAL

## 2023-12-21 DIAGNOSIS — G89.18 POSTOPERATIVE PAIN: Primary | ICD-10-CM

## 2023-12-21 DIAGNOSIS — R11.0 NAUSEA WITHOUT VOMITING: Primary | ICD-10-CM

## 2023-12-21 RX ORDER — SCOLOPAMINE TRANSDERMAL SYSTEM 1 MG/1
1 PATCH, EXTENDED RELEASE TRANSDERMAL
Qty: 1 EACH | Refills: 0 | Status: SHIPPED | OUTPATIENT
Start: 2023-12-21

## 2023-12-21 RX ORDER — OXYCODONE HYDROCHLORIDE AND ACETAMINOPHEN 5; 325 MG/1; MG/1
1 TABLET ORAL EVERY 6 HOURS PRN
Qty: 5 TABLET | Refills: 0 | Status: SHIPPED | OUTPATIENT
Start: 2023-12-21

## 2023-12-21 NOTE — TELEPHONE ENCOUNTER
Please let her know that I sent Percocet 5-325 mg that she may take every 6 hours to her pharmacy this morning.     Zenobia Garcia MD

## 2023-12-21 NOTE — TELEPHONE ENCOUNTER
Pt called asking for a little stronger medication to be sent to her pharmacy on her file? Pt is taking ibuprofen 800 MG and pt states she's in a little bit more pain than expected. Pt states she doesn't want anything super strong.     Pharmacy- [facility] [id number] [facility] [address] - [phone]  - [phone] FX     Please advise, thank you

## 2023-12-21 NOTE — TELEPHONE ENCOUNTER
Pt aware, thank you. Pt also asked if you could please send in nausea patches that go behind your ear? Pt stated she's looked at [facility] and pharmacies and she can't find them. Pt also wanted to let you know that she no longer has a PCP because she retired and pt had no idea.     Please advise, thank you

## 2023-12-22 ENCOUNTER — TELEPHONE (OUTPATIENT)
Dept: LABOR AND DELIVERY | Facility: HOSPITAL | Age: 43
End: 2023-12-22
Payer: COMMERCIAL

## 2023-12-22 LAB
LAB AP CASE REPORT: NORMAL
LAB AP DIAGNOSIS COMMENT: NORMAL
PATH REPORT.FINAL DX SPEC: NORMAL
PATH REPORT.GROSS SPEC: NORMAL

## 2023-12-22 NOTE — TELEPHONE ENCOUNTER
Attempted to contact the patient but no answer and mailbox full. We send [employer] message and call on Tuesday after the holiday.     Zenobia Garcia MD

## 2023-12-27 ENCOUNTER — OFFICE VISIT (OUTPATIENT)
Dept: INTERNAL MEDICINE | Facility: CLINIC | Age: 43
End: 2023-12-27
Payer: COMMERCIAL

## 2023-12-27 VITALS
WEIGHT: 176.2 LBS | HEART RATE: 68 BPM | BODY MASS INDEX: 27.65 KG/M2 | DIASTOLIC BLOOD PRESSURE: 68 MMHG | RESPIRATION RATE: 16 BRPM | OXYGEN SATURATION: 100 % | SYSTOLIC BLOOD PRESSURE: 114 MMHG | TEMPERATURE: 97.8 F | HEIGHT: 67 IN

## 2023-12-27 DIAGNOSIS — R11.0 NAUSEA WITHOUT VOMITING: ICD-10-CM

## 2023-12-27 DIAGNOSIS — K29.70 GASTRITIS WITHOUT BLEEDING, UNSPECIFIED CHRONICITY, UNSPECIFIED GASTRITIS TYPE: Primary | ICD-10-CM

## 2023-12-27 RX ORDER — SCOLOPAMINE TRANSDERMAL SYSTEM 1 MG/1
1 PATCH, EXTENDED RELEASE TRANSDERMAL
Qty: 4 EACH | Refills: 0 | Status: SHIPPED | OUTPATIENT
Start: 2023-12-27

## 2023-12-27 RX ORDER — PANTOPRAZOLE SODIUM 40 MG/1
40 TABLET, DELAYED RELEASE ORAL 2 TIMES DAILY
Qty: 60 TABLET | Refills: 2 | Status: SHIPPED | OUTPATIENT
Start: 2023-12-27

## 2023-12-27 RX ORDER — SUCRALFATE 1 G/1
1 TABLET ORAL 4 TIMES DAILY
Qty: 56 TABLET | Refills: 0 | Status: SHIPPED | OUTPATIENT
Start: 2023-12-27 | End: 2024-01-10

## 2023-12-27 NOTE — PROGRESS NOTES
"Chief Complaint  Nausea (Nausea x 3 weeks/Had D & C on 12/20/23)    Subjective        Yadi Syed presents to [facility]  History of Present Illness  Here with nausea x3 weeks  Had D&C with gyn   Prior history of PUD and GERD   Not on PPI therapy  No other associated symptoms, no longer needing pain medication  Using scopolamine patch which has helped a lot     Objective   Vital Signs:  /68   Pulse 68   Temp 97.8 °F (36.6 °C)   Resp 16   Ht 170.2 cm (67\")   Wt 79.9 kg (176 lb 3.2 oz)   SpO2 100%   BMI 27.60 kg/m²   Estimated body mass index is 27.6 kg/m² as calculated from the following:    Height as of this encounter: 170.2 cm (67\").    Weight as of this encounter: 79.9 kg (176 lb 3.2 oz).       Physical Exam  Vitals and nursing note reviewed.   Constitutional:       General: She is not in acute distress.     Appearance: Normal appearance.   HENT:      Head: Normocephalic and atraumatic.      Right Ear: External ear normal.      Left Ear: External ear normal.      Nose: Nose normal. No congestion.      Mouth/Throat:      Mouth: Mucous membranes are moist.      Pharynx: No oropharyngeal exudate or posterior oropharyngeal erythema.   Eyes:      Extraocular Movements: Extraocular movements intact.      Conjunctiva/sclera: Conjunctivae normal.      Pupils: Pupils are equal, round, and reactive to light.   Cardiovascular:      Rate and Rhythm: Normal rate and regular rhythm.      Pulses: Normal pulses.      Heart sounds: Normal heart sounds. No murmur heard.  Pulmonary:      Effort: Pulmonary effort is normal. No respiratory distress.      Breath sounds: Normal breath sounds. No wheezing or rales.   Abdominal:      General: Abdomen is flat. Bowel sounds are normal. There is no distension.      Palpations: Abdomen is soft.      Tenderness: There is no abdominal tenderness.   Musculoskeletal:      Cervical back: Normal range of motion.   Skin:     General: " Skin is warm.      Capillary Refill: Capillary refill takes less than 2 seconds.   Neurological:      General: No focal deficit present.      Mental Status: She is alert and oriented to person, place, and time. Mental status is at baseline.      Cranial Nerves: No cranial nerve deficit.   Psychiatric:         Mood and Affect: Mood normal.         Behavior: Behavior normal.         Thought Content: Thought content normal.        Result Review :  The following data was reviewed by: Gordon Sosa MD on 12/27/2023:  Common labs          1/24/2023    22:02 8/8/2023    16:02 12/18/2023    13:13   Common Labs   Glucose 95         BUN 15         Creatinine 0.78         EGFR African Am >60         Sodium 138         Potassium 4.1         Chloride 104         Calcium 9.4         Albumin 3.9         Total Bilirubin 0.2         Alkaline Phosphatase 67         AST (SGOT) 17         ALT (SGPT) 15         WBC 9.20     9.97  8.28    Hemoglobin 14.7     15.9  14.6    Hematocrit 44.5     48.0  44.7    Platelets 356     380  356       Details          This result is from an external source.             Data reviewed : prior office notes reviewed             Assessment and Plan   Diagnoses and all orders for this visit:    1. Gastritis without bleeding, unspecified chronicity, unspecified gastritis type (Primary)  -     pantoprazole (Protonix) 40 MG EC tablet; Take 1 tablet by mouth 2 (Two) Times a Day.  Dispense: 60 tablet; Refill: 2  -     sucralfate (Carafate) 1 g tablet; Take 1 tablet by mouth 4 (Four) Times a Day for 14 days.  Dispense: 56 tablet; Refill: 0    2. Nausea without vomiting  -     Scopolamine 1 MG/3DAYS patch; Place 1 patch on the skin as directed by provider Every 72 (Seventy-Two) Hours.  Dispense: 4 each; Refill: 0    Suspected gastritis post operatively, start PPI BID and carafate x2 weeks, then PPI daily thereafter 2-3 months if improving. If not improving will consider additional workup. Watch for symptom  progression as now just isolated nausea.       I spent 15 minutes caring for Yadi on this date of service. This time includes time spent by me in the following activities:preparing for the visit, reviewing tests, obtaining and/or reviewing a separately obtained history, performing a medically appropriate examination and/or evaluation , counseling and educating the patient/family/caregiver, ordering medications, tests, or procedures, and documenting information in the medical record  Follow Up   Return for Next scheduled follow up.  Patient was given instructions and counseling regarding her condition or for health maintenance advice. Please see specific information pulled into the AVS if appropriate.       Gordon Sosa MD  [facility]  [address] [place] [address]  Phone: [phone]

## 2023-12-29 RX ORDER — METRONIDAZOLE 500 MG/1
500 TABLET ORAL 2 TIMES DAILY
Qty: 14 TABLET | Refills: 1 | Status: SHIPPED | OUTPATIENT
Start: 2023-12-29 | End: 2024-01-05

## 2024-01-09 ENCOUNTER — OFFICE VISIT (OUTPATIENT)
Dept: OBSTETRICS AND GYNECOLOGY | Facility: CLINIC | Age: 44
End: 2024-01-09
Payer: COMMERCIAL

## 2024-01-09 VITALS
HEIGHT: 67 IN | BODY MASS INDEX: 27.62 KG/M2 | WEIGHT: 176 LBS | SYSTOLIC BLOOD PRESSURE: 113 MMHG | DIASTOLIC BLOOD PRESSURE: 80 MMHG

## 2024-01-09 DIAGNOSIS — Z98.890 STATUS POST HYSTEROSCOPY: ICD-10-CM

## 2024-01-09 DIAGNOSIS — Z98.890 S/P CONIZATION OF CERVIX: ICD-10-CM

## 2024-01-09 DIAGNOSIS — N30.01 ACUTE CYSTITIS WITH HEMATURIA: ICD-10-CM

## 2024-01-09 DIAGNOSIS — Z09 POSTOPERATIVE FOLLOW-UP: Primary | ICD-10-CM

## 2024-01-09 RX ORDER — SULFAMETHOXAZOLE AND TRIMETHOPRIM 800; 160 MG/1; MG/1
1 TABLET ORAL 2 TIMES DAILY
Qty: 6 TABLET | Refills: 0 | Status: SHIPPED | OUTPATIENT
Start: 2024-01-09

## 2024-01-09 NOTE — PROGRESS NOTES
Chief Complaint   Patient presents with    Post-op     Patient here after leep procedure        Yadi Syed is a 43 y.o. female who presents to the clinic 3 weeks status post hysteroscopy, dilation and curettage, endometrial resection with Myosure Lite, cervical conization for abnormal uterine bleeding  and severe cervical dysplasia (JERONIMO III), respectively, on 12/20/23. She reports that she struggled with pain initially after surgery but then was feeling back to normal about a week later. She then started experiencing very foul smelling discharge that improved with prescription for Flagyl. She was then feeling better with small amount of non-odorous discharge when she began to have her period on 1/3/24. She reports that she experienced the worse period cramps she has ever felt and nothing improved it including heat and ibuprofen. She states they are much better now because she is near the end of her period. However, she is concerned that she may have a UTI because she is having lower back pain, more frequent urination. She denies fever, chills, dysuria, urinary urgency.  She is tolerating a regular diet. She is having regular bowel function. She has remained on pelvic rest.     Past Medical History:   Diagnosis Date    Acute cholecystitis 10/22/2016    Acute sinusitis, unspecified     ADHD     NOT MEDICATED    Anxiety disorder, unspecified     Attention and concentration deficit     Bacterial infection due to H. pylori     Cervicalgia     Chills (without fever)     Chronic frontal sinusitis     Chronic low back pain     ER visits for low back pain/ back spasms;MRI L spine    Cystitis, unspecified without hematuria     Dehydration     HOSPITALIZED    Diarrhea, unspecified     Dorsalgia, unspecified     Epigastric pain     Frequency of micturition     GERD (gastroesophageal reflux disease)     Headache     Hematuria, unspecified     Migraine, unspecified, not intractable, without status migrainosus     Nausea      Otalgia, bilateral     Other chronic pain     Other specified noninflammatory disorders of vagina     Papanicolaou smear     last 2014 was normal,  4/2013 was abn but no procedure, +h/o with cryo x 2, (22 yo, 25 yo)    Peripheral neuropathy     Polyneuropathy, unspecified     PONV (postoperative nausea and vomiting)     PUD (peptic ulcer disease)     Radiculopathy, cervical region     Spinal headache     Trochanteric bursitis of both hips 4/5/2022    Trochanteric bursitis, unspecified hip     Unspecified nonsuppurative otitis media, bilateral      Past Surgical History:   Procedure Laterality Date    BREAST BIOPSY      CERVICAL CONIZATION N/A 12/20/2023    Procedure: CERVICAL CONIZATION;  Surgeon: Zenobia Garcia MD;  Location: [facility] MAIN OR;  Service: Obstetrics/Gynecology;  Laterality: N/A;    CHOLECYSTECTOMY      COLONOSCOPY N/A 06/12/2023    Procedure: COLONOSCOPY to cecum with Polypectomy;  Surgeon: Francois Ha MD;  Location: [facility] [place];  Service: Gastroenterology;  Laterality: N/A;  Adequate Prep, Ascending Polyp with a saline left, [place], Clip x2 Tattoo to ascending Colon ,Ascending polyp with biopsy x2 polyps,Transverse Polyp, Descending Polyp x2, Sigmoid Polyp x2, recto-sigmoid Polyp and Rectal polyp x2    D & C HYSTEROSCOPY N/A 12/20/2023    Procedure: HYSTEROSCOPY, DILATION AND CURETTAGE, endometrial resection WITH MYOSURE;  Surgeon: Zenobia Garcia MD;  Location: [facility] MAIN OR;  Service: Obstetrics/Gynecology;  Laterality: N/A;    ENDOSCOPY N/A 08/30/2021    Procedure: ESOPHAGOGASTRODUODENOSCOPY;  Surgeon: Francois Ha MD;  Location: [facility] OR;  Service: Gastroenterology;  Laterality: N/A;  irregular z-line, gastritis    OTHER SURGICAL HISTORY      VUR REPAIR AT 12 YO    UTERINE FIBROID SURGERY      while pregnant at 22 yo     Social History     Tobacco Use    Smoking status: Former     Types: Cigarettes    Smokeless tobacco: Never    Tobacco comments:     moderate   Vaping Use  "   Vaping Use: Every day    Substances: Nicotine   Substance Use Topics    Alcohol use: Not Currently    Drug use: Never     Family History   Problem Relation Age of Onset    Ovarian cancer Mother     Colon polyps Mother     Drug abuse Father         NOT IN HER LIFE    Colon cancer Brother     Stroke Maternal Aunt     Thyroid disease Maternal Grandmother     Stroke Paternal Grandmother     Crohn's disease Neg Hx     Irritable bowel syndrome Neg Hx     Ulcerative colitis Neg Hx     Malig Hyperthermia Neg Hx          Review of Systems   Constitutional:  Negative for chills and fever.   Gastrointestinal:  Negative for abdominal pain.   Genitourinary:  Positive for frequency, vaginal bleeding and vaginal discharge. Negative for difficulty urinating, dysuria and urgency.   Musculoskeletal:  Positive for back pain.       OBJECTIVE:   Vitals:    01/09/24 1439   BP: 113/80   Weight: 79.8 kg (176 lb)   Height: 170.2 cm (67.01\")        Physical Exam  Vitals reviewed. Exam conducted with a chaperone present.   Constitutional:       General: She is not in acute distress.  HENT:      Head: Normocephalic and atraumatic.      Right Ear: External ear normal.      Left Ear: External ear normal.   Eyes:      Extraocular Movements: Extraocular movements intact.      Pupils: Pupils are equal, round, and reactive to light.   Pulmonary:      Effort: Pulmonary effort is normal. No respiratory distress.   Abdominal:      General: There is no distension.      Palpations: Abdomen is soft. There is no mass.      Tenderness: There is no abdominal tenderness. There is no right CVA tenderness, left CVA tenderness or guarding.   Genitourinary:     General: Normal vulva.      Exam position: Lithotomy position.      Labia:         Right: No rash, tenderness, lesion or injury.         Left: No rash, tenderness, lesion or injury.       Urethra: No prolapse or urethral swelling.      Vagina: Vaginal discharge present. No erythema, tenderness, bleeding " or lesions.      Cervix: Discharge present. No friability or cervical bleeding.      Uterus: Not enlarged, not fixed and not tender.       Adnexa:         Right: Tenderness present. No mass or fullness.          Left: Tenderness present. No mass or fullness.        Comments: Cervix appears to be healing well with expected changes following conization. Purple stay sutures still in place at 3:00 and 9:00 o'clock. Small amount of yellow-brown discharge present in the vaginal vault without odor. Mild tenderness surrounding the adnexa, lower uterus and bladder.   Musculoskeletal:         General: No deformity. Normal range of motion.      Cervical back: Normal range of motion and neck supple.   Lymphadenopathy:      Lower Body: No right inguinal adenopathy. No left inguinal adenopathy.   Skin:     General: Skin is warm and dry.   Neurological:      General: No focal deficit present.      Mental Status: She is alert and oriented to person, place, and time.   Psychiatric:         Mood and Affect: Mood normal.         Behavior: Behavior normal.         Final Diagnosis   1. Endometrium, Curettings:               A. Predominantly proliferative endometrium with rare fragments suggestive of polyp formation.     2. Cervix, Conization:               A. Cervix with high-grade squamous intraepithelial lesion (HSIL/JERONIMO-2-3), which a detached fragment       extends to the endocervical peripheral tissue edge (see comment).               B. Endocervix with nabothian cysts.         ASSESSMENT:  S/p hysteroscopy, D&C, endometrial resection with Myosure Lite, cervical conization for AUB and JERONIMO II-III   UTI symptoms     PLAN:   Cervix appears to be healing well on exam today but stay sutures are still present and may take approximately 2-3 more weeks before they will resolve. Recommended pelvic rest for another 1-2 weeks while the cervix continues to fully heal and sutures dissolve. Advised to remain on pelvic rest if she experiences  light vaginal bleeding after intercourse and may resume intercourse after an additional week of pelvic rest.   I discussed that I suspect her increased dysmenorrhea was related to recent surgery and hopefully will resolve by her next cycle.  UA positive for small amount of blood and will send urine culture. DS Bactrim PO BID x 3 days for suspected cystitis.   Reviewed pathology results as documented above and given JERONIMO II-III extending to the endocervical margin, I recommended repeat pap smear with ECC in 4-6 months following procedure.   All questions and concerns answered.   RTO for 4-5 months for repeat pap smear and ECC.     Zenobia Garcia MD

## 2024-01-11 LAB
BACTERIA UR CULT: NORMAL
BACTERIA UR CULT: NORMAL

## 2024-05-28 ENCOUNTER — HOSPITAL ENCOUNTER (EMERGENCY)
Facility: HOSPITAL | Age: 44
Discharge: HOME | End: 2024-05-29
Attending: EMERGENCY MEDICINE
Payer: COMMERCIAL

## 2024-05-28 DIAGNOSIS — R31.0 GROSS HEMATURIA: Primary | ICD-10-CM

## 2024-05-28 DIAGNOSIS — N20.0 KIDNEY STONE ON LEFT SIDE: ICD-10-CM

## 2024-05-28 LAB
BACTERIA UR QL AUTO: ABNORMAL /HPF
BASOPHILS # BLD AUTO: 0.04 10*3/MM3 (ref 0–0.2)
BASOPHILS NFR BLD AUTO: 0.5 % (ref 0–1.5)
BILIRUB UR QL STRIP: NEGATIVE
CLARITY UR: CLEAR
COLOR UR: ABNORMAL
DEPRECATED RDW RBC AUTO: 44.1 FL (ref 37–54)
EOSINOPHIL # BLD AUTO: 0.31 10*3/MM3 (ref 0–0.4)
EOSINOPHIL NFR BLD AUTO: 3.7 % (ref 0.3–6.2)
ERYTHROCYTE [DISTWIDTH] IN BLOOD BY AUTOMATED COUNT: 12.4 % (ref 12.3–15.4)
GLUCOSE UR STRIP-MCNC: NEGATIVE MG/DL
HCT VFR BLD AUTO: 43.8 % (ref 34–46.6)
HGB BLD-MCNC: 14.5 G/DL (ref 12–15.9)
HGB UR QL STRIP.AUTO: ABNORMAL
HYALINE CASTS UR QL AUTO: ABNORMAL /LPF
IMM GRANULOCYTES # BLD AUTO: 0.01 10*3/MM3 (ref 0–0.05)
IMM GRANULOCYTES NFR BLD AUTO: 0.1 % (ref 0–0.5)
KETONES UR QL STRIP: NEGATIVE
LEUKOCYTE ESTERASE UR QL STRIP.AUTO: NEGATIVE
LYMPHOCYTES # BLD AUTO: 4.13 10*3/MM3 (ref 0.7–3.1)
LYMPHOCYTES NFR BLD AUTO: 49.4 % (ref 19.6–45.3)
MCH RBC QN AUTO: 31.7 PG (ref 26.6–33)
MCHC RBC AUTO-ENTMCNC: 33.1 G/DL (ref 31.5–35.7)
MCV RBC AUTO: 95.8 FL (ref 79–97)
MONOCYTES # BLD AUTO: 0.71 10*3/MM3 (ref 0.1–0.9)
MONOCYTES NFR BLD AUTO: 8.5 % (ref 5–12)
NEUTROPHILS NFR BLD AUTO: 3.16 10*3/MM3 (ref 1.7–7)
NEUTROPHILS NFR BLD AUTO: 37.8 % (ref 42.7–76)
NITRITE UR QL STRIP: NEGATIVE
NRBC BLD AUTO-RTO: 0 /100 WBC (ref 0–0.2)
PH UR STRIP.AUTO: 6 [PH] (ref 5–8)
PLATELET # BLD AUTO: 300 10*3/MM3 (ref 140–450)
PMV BLD AUTO: 8.5 FL (ref 6–12)
PROT UR QL STRIP: ABNORMAL
RBC # BLD AUTO: 4.57 10*6/MM3 (ref 3.77–5.28)
RBC # UR STRIP: ABNORMAL /HPF
REF LAB TEST METHOD: ABNORMAL
SP GR UR STRIP: 1.02 (ref 1–1.03)
SQUAMOUS #/AREA URNS HPF: ABNORMAL /HPF
UROBILINOGEN UR QL STRIP: ABNORMAL
WBC # UR STRIP: ABNORMAL /HPF
WBC NRBC COR # BLD AUTO: 8.36 10*3/MM3 (ref 3.4–10.8)

## 2024-05-28 PROCEDURE — 80053 COMPREHEN METABOLIC PANEL: CPT | Performed by: EMERGENCY MEDICINE

## 2024-05-28 PROCEDURE — 84703 CHORIONIC GONADOTROPIN ASSAY: CPT | Performed by: EMERGENCY MEDICINE

## 2024-05-28 PROCEDURE — 83690 ASSAY OF LIPASE: CPT | Performed by: EMERGENCY MEDICINE

## 2024-05-28 PROCEDURE — 85025 COMPLETE CBC W/AUTO DIFF WBC: CPT | Performed by: EMERGENCY MEDICINE

## 2024-05-28 PROCEDURE — 81001 URINALYSIS AUTO W/SCOPE: CPT | Performed by: EMERGENCY MEDICINE

## 2024-05-28 PROCEDURE — 99284 EMERGENCY DEPT VISIT MOD MDM: CPT

## 2024-05-28 RX ORDER — KETOROLAC TROMETHAMINE 15 MG/ML
15 INJECTION, SOLUTION INTRAMUSCULAR; INTRAVENOUS ONCE
Status: COMPLETED | OUTPATIENT
Start: 2024-05-29 | End: 2024-05-29

## 2024-05-28 RX ORDER — ONDANSETRON 2 MG/ML
4 INJECTION INTRAMUSCULAR; INTRAVENOUS ONCE
Status: COMPLETED | OUTPATIENT
Start: 2024-05-29 | End: 2024-05-29

## 2024-05-28 RX ORDER — SODIUM CHLORIDE 0.9 % (FLUSH) 0.9 %
10 SYRINGE (ML) INJECTION AS NEEDED
Status: DISCONTINUED | OUTPATIENT
Start: 2024-05-28 | End: 2024-05-29 | Stop reason: HOSPADM

## 2024-05-29 ENCOUNTER — APPOINTMENT (OUTPATIENT)
Dept: CT IMAGING | Facility: HOSPITAL | Age: 44
End: 2024-05-29
Payer: COMMERCIAL

## 2024-05-29 VITALS
TEMPERATURE: 97.2 F | HEIGHT: 67 IN | SYSTOLIC BLOOD PRESSURE: 105 MMHG | WEIGHT: 190 LBS | HEART RATE: 92 BPM | BODY MASS INDEX: 29.82 KG/M2 | DIASTOLIC BLOOD PRESSURE: 55 MMHG | RESPIRATION RATE: 17 BRPM | OXYGEN SATURATION: 100 %

## 2024-05-29 LAB
ALBUMIN SERPL-MCNC: 3.9 G/DL (ref 3.5–5.2)
ALBUMIN/GLOB SERPL: 1.7 G/DL
ALP SERPL-CCNC: 69 U/L (ref 39–117)
ALT SERPL W P-5'-P-CCNC: 13 U/L (ref 1–33)
ANION GAP SERPL CALCULATED.3IONS-SCNC: 9.3 MMOL/L (ref 5–15)
AST SERPL-CCNC: 14 U/L (ref 1–32)
BILIRUB SERPL-MCNC: <0.2 MG/DL (ref 0–1.2)
BUN SERPL-MCNC: 12 MG/DL (ref 6–20)
BUN/CREAT SERPL: 16 (ref 7–25)
CALCIUM SPEC-SCNC: 8.9 MG/DL (ref 8.6–10.5)
CHLORIDE SERPL-SCNC: 106 MMOL/L (ref 98–107)
CO2 SERPL-SCNC: 24.7 MMOL/L (ref 22–29)
CREAT SERPL-MCNC: 0.75 MG/DL (ref 0.57–1)
EGFRCR SERPLBLD CKD-EPI 2021: 102.1 ML/MIN/1.73
GLOBULIN UR ELPH-MCNC: 2.3 GM/DL
GLUCOSE SERPL-MCNC: 90 MG/DL (ref 65–99)
HCG SERPL QL: NEGATIVE
LIPASE SERPL-CCNC: 51 U/L (ref 13–60)
POTASSIUM SERPL-SCNC: 3.7 MMOL/L (ref 3.5–5.2)
PROT SERPL-MCNC: 6.2 G/DL (ref 6–8.5)
SODIUM SERPL-SCNC: 140 MMOL/L (ref 136–145)

## 2024-05-29 PROCEDURE — 25010000002 KETOROLAC TROMETHAMINE PER 15 MG: Performed by: EMERGENCY MEDICINE

## 2024-05-29 PROCEDURE — 25010000002 ONDANSETRON PER 1 MG: Performed by: EMERGENCY MEDICINE

## 2024-05-29 PROCEDURE — 96374 THER/PROPH/DIAG INJ IV PUSH: CPT

## 2024-05-29 PROCEDURE — 25810000003 SODIUM CHLORIDE 0.9 % SOLUTION: Performed by: EMERGENCY MEDICINE

## 2024-05-29 PROCEDURE — 74176 CT ABD & PELVIS W/O CONTRAST: CPT

## 2024-05-29 PROCEDURE — 96375 TX/PRO/DX INJ NEW DRUG ADDON: CPT

## 2024-05-29 RX ADMIN — KETOROLAC TROMETHAMINE 15 MG: 15 INJECTION, SOLUTION INTRAMUSCULAR; INTRAVENOUS at 00:01

## 2024-05-29 RX ADMIN — ONDANSETRON 4 MG: 2 INJECTION INTRAMUSCULAR; INTRAVENOUS at 00:01

## 2024-05-29 RX ADMIN — SODIUM CHLORIDE 1000 ML: 9 INJECTION, SOLUTION INTRAVENOUS at 00:02

## 2024-05-29 NOTE — ED PROVIDER NOTES
EMERGENCY DEPARTMENT ENCOUNTER    Room Number:  09/09  PCP: Gordon Sosa MD  Historian: Patient      HPI:  Chief Complaint: Left flank pain  A complete HPI/ROS/PMH/PSH/SH/FH are unobtainable due to: None  Context: Yadi Syed is a 44 y.o. female who presents to the ED c/o sudden onset of left flank pain that has now been present for the last several days with radiation into her left lower abdomen.  She also reports hematuria with blood on the toilet paper after wiping.  She states that symptoms are currently moderate in intensity and have been present essentially since onset.  She denies nausea/vomiting, fever/chills, chest pain, shortness of breath, or known sick contacts.            PAST MEDICAL HISTORY  Active Ambulatory Problems     Diagnosis Date Noted    Radiculopathy, cervical region     PUD (peptic ulcer disease)     Polyneuropathy, unspecified     Other chronic pain     Migraine, unspecified, not intractable, without status migrainosus     GERD (gastroesophageal reflux disease)     Attention and concentration deficit     Anxiety and depression     Chronic low back pain     Early satiety 08/12/2021    H. pylori infection 08/12/2021    Lumbar disc herniation with radiculopathy 09/14/2021    Trochanteric bursitis of both hips 04/05/2022    Encounter for screening for malignant neoplasm of colon 06/06/2023    Family history of colon cancer 06/06/2023    Severe dysplasia of cervix (JERONIMO III) 11/21/2023    Abnormal uterine bleeding (AUB) 11/21/2023     Resolved Ambulatory Problems     Diagnosis Date Noted    Acute cholecystitis 10/22/2016    Endometrial polyp 11/21/2023     Past Medical History:   Diagnosis Date    Acute sinusitis, unspecified     ADHD     Anxiety disorder, unspecified     Bacterial infection due to H. pylori     Cervicalgia     Chills (without fever)     Chronic frontal sinusitis     Cystitis, unspecified without hematuria     Dehydration     Diarrhea, unspecified     Dorsalgia, unspecified      Epigastric pain     Frequency of micturition     Headache     Hematuria, unspecified     Nausea     Otalgia, bilateral     Other specified noninflammatory disorders of vagina     Papanicolaou smear     Peripheral neuropathy     PONV (postoperative nausea and vomiting)     Spinal headache     Trochanteric bursitis, unspecified hip     Unspecified nonsuppurative otitis media, bilateral          PAST SURGICAL HISTORY  Past Surgical History:   Procedure Laterality Date    BREAST BIOPSY      CERVICAL CONIZATION N/A 12/20/2023    Procedure: CERVICAL CONIZATION;  Surgeon: Zenobia Garcia MD;  Location: [facility] MAIN OR;  Service: Obstetrics/Gynecology;  Laterality: N/A;    CHOLECYSTECTOMY      COLONOSCOPY N/A 06/12/2023    Procedure: COLONOSCOPY to cecum with Polypectomy;  Surgeon: Francois Ha MD;  Location: [facility];  Service: Gastroenterology;  Laterality: N/A;  Adequate Prep, Ascending Polyp with a saline left, Hotsnare, Clip x2 Tattoo to ascending Colon ,Ascending polyp with biopsy x2 polyps,Transverse Polyp, Descending Polyp x2, Sigmoid Polyp x2, recto-sigmoid Polyp and Rectal polyp x2    D & C HYSTEROSCOPY N/A 12/20/2023    Procedure: HYSTEROSCOPY, DILATION AND CURETTAGE, endometrial resection WITH MYOSURE;  Surgeon: Zenobia Garcia MD;  Location: [facility] MAIN OR;  Service: Obstetrics/Gynecology;  Laterality: N/A;    ENDOSCOPY N/A 08/30/2021    Procedure: ESOPHAGOGASTRODUODENOSCOPY;  Surgeon: Francois Ha MD;  Location: [facility];  Service: Gastroenterology;  Laterality: N/A;  irregular z-line, gastritis    OTHER SURGICAL HISTORY      VUR REPAIR AT 12 YO    UTERINE FIBROID SURGERY      while pregnant at 22 yo         FAMILY HISTORY  Family History   Problem Relation Age of Onset    Ovarian cancer Mother     Colon polyps Mother     Drug abuse Father         NOT IN HER LIFE    Colon cancer Brother     Stroke Maternal Aunt     Thyroid disease Maternal Grandmother     Stroke Paternal Grandmother      Crohn's disease Neg Hx     Irritable bowel syndrome Neg Hx     Ulcerative colitis Neg Hx     Malig Hyperthermia Neg Hx          SOCIAL HISTORY  Social History     Socioeconomic History    Marital status:    Tobacco Use    Smoking status: Former     Types: Cigarettes    Smokeless tobacco: Never    Tobacco comments:     moderate   Vaping Use    Vaping status: Every Day    Substances: Nicotine   Substance and Sexual Activity    Alcohol use: Not Currently    Drug use: Never    Sexual activity: Yes         ALLERGIES  Patient has no known allergies.        REVIEW OF SYSTEMS  Review of Systems   Constitutional:  Negative for fever.   HENT:  Negative for sore throat.    Eyes: Negative.    Respiratory:  Negative for cough and shortness of breath.    Cardiovascular:  Negative for chest pain.   Gastrointestinal:  Positive for abdominal pain and nausea. Negative for diarrhea and vomiting.   Genitourinary:  Positive for flank pain and hematuria. Negative for dysuria.   Musculoskeletal:  Negative for neck pain.   Skin:  Negative for rash.   Allergic/Immunologic: Negative.    Neurological:  Negative for weakness, numbness and headaches.   Hematological: Negative.    Psychiatric/Behavioral: Negative.     All other systems reviewed and are negative.         PHYSICAL EXAM  ED Triage Vitals   Temp Heart Rate Resp BP SpO2   05/28/24 2312 05/28/24 2312 05/28/24 2312 05/28/24 2326 05/28/24 2312   97.4 °F (36.3 °C) 92 18 115/77 99 %      Temp src Heart Rate Source Patient Position BP Location FiO2 (%)   -- -- 05/28/24 2326 05/28/24 2326 --     Lying Left arm        Physical Exam  Constitutional:       General: She is not in acute distress.     Appearance: Normal appearance. She is not ill-appearing or toxic-appearing.   HENT:      Head: Normocephalic and atraumatic.   Eyes:      Extraocular Movements: Extraocular movements intact.      Pupils: Pupils are equal, round, and reactive to light.   Cardiovascular:      Rate and Rhythm:  Normal rate and regular rhythm.      Heart sounds: No murmur heard.     No friction rub. No gallop.   Pulmonary:      Effort: Pulmonary effort is normal.      Breath sounds: Normal breath sounds.   Abdominal:      General: Abdomen is flat. There is no distension.      Palpations: Abdomen is soft.      Tenderness: There is no abdominal tenderness.   Musculoskeletal:         General: No swelling or tenderness. Normal range of motion.      Cervical back: Normal range of motion and neck supple.   Skin:     General: Skin is warm and dry.   Neurological:      General: No focal deficit present.      Mental Status: She is alert and oriented to person, place, and time.      Sensory: No sensory deficit.      Motor: No weakness.   Psychiatric:         Mood and Affect: Mood normal.         Behavior: Behavior normal.         Vital signs and nursing notes reviewed.          LAB RESULTS  Recent Results (from the past 24 hour(s))   Urinalysis With Microscopic If Indicated (No Culture) - Urine, Clean Catch    Collection Time: 05/28/24 11:30 PM    Specimen: Urine, Clean Catch   Result Value Ref Range    Color, UA Dark Yellow (A) Yellow, Straw    Appearance, UA Clear Clear    pH, UA 6.0 5.0 - 8.0    Specific Gravity, UA 1.016 1.005 - 1.030    Glucose, UA Negative Negative    Ketones, UA Negative Negative    Bilirubin, UA Negative Negative    Blood, UA Large (3+) (A) Negative    Protein, UA Trace (A) Negative    Leuk Esterase, UA Negative Negative    Nitrite, UA Negative Negative    Urobilinogen, UA 1.0 E.U./dL 0.2 - 1.0 E.U./dL   Urinalysis, Microscopic Only - Urine, Clean Catch    Collection Time: 05/28/24 11:30 PM    Specimen: Urine, Clean Catch   Result Value Ref Range    RBC, UA Too Numerous to Count (A) None Seen, 0-2 /HPF    WBC, UA 0-2 None Seen, 0-2 /HPF    Bacteria, UA None Seen None Seen /HPF    Squamous Epithelial Cells, UA 0-2 None Seen, 0-2 /HPF    Hyaline Casts, UA None Seen None Seen /LPF    Methodology Automated  Microscopy    Comprehensive Metabolic Panel    Collection Time: 05/28/24 11:35 PM    Specimen: Blood   Result Value Ref Range    Glucose 90 65 - 99 mg/dL    BUN 12 6 - 20 mg/dL    Creatinine 0.75 0.57 - 1.00 mg/dL    Sodium 140 136 - 145 mmol/L    Potassium 3.7 3.5 - 5.2 mmol/L    Chloride 106 98 - 107 mmol/L    CO2 24.7 22.0 - 29.0 mmol/L    Calcium 8.9 8.6 - 10.5 mg/dL    Total Protein 6.2 6.0 - 8.5 g/dL    Albumin 3.9 3.5 - 5.2 g/dL    ALT (SGPT) 13 1 - 33 U/L    AST (SGOT) 14 1 - 32 U/L    Alkaline Phosphatase 69 39 - 117 U/L    Total Bilirubin <0.2 0.0 - 1.2 mg/dL    Globulin 2.3 gm/dL    A/G Ratio 1.7 g/dL    BUN/Creatinine Ratio 16.0 7.0 - 25.0    Anion Gap 9.3 5.0 - 15.0 mmol/L    eGFR 102.1 >60.0 mL/min/1.73   Lipase    Collection Time: 05/28/24 11:35 PM    Specimen: Blood   Result Value Ref Range    Lipase 51 13 - 60 U/L   hCG, Serum, Qualitative    Collection Time: 05/28/24 11:35 PM    Specimen: Blood   Result Value Ref Range    HCG Qualitative Negative Negative   CBC Auto Differential    Collection Time: 05/28/24 11:35 PM    Specimen: Blood   Result Value Ref Range    WBC 8.36 3.40 - 10.80 10*3/mm3    RBC 4.57 3.77 - 5.28 10*6/mm3    Hemoglobin 14.5 12.0 - 15.9 g/dL    Hematocrit 43.8 34.0 - 46.6 %    MCV 95.8 79.0 - 97.0 fL    MCH 31.7 26.6 - 33.0 pg    MCHC 33.1 31.5 - 35.7 g/dL    RDW 12.4 12.3 - 15.4 %    RDW-SD 44.1 37.0 - 54.0 fl    MPV 8.5 6.0 - 12.0 fL    Platelets 300 140 - 450 10*3/mm3    Neutrophil % 37.8 (L) 42.7 - 76.0 %    Lymphocyte % 49.4 (H) 19.6 - 45.3 %    Monocyte % 8.5 5.0 - 12.0 %    Eosinophil % 3.7 0.3 - 6.2 %    Basophil % 0.5 0.0 - 1.5 %    Immature Grans % 0.1 0.0 - 0.5 %    Neutrophils, Absolute 3.16 1.70 - 7.00 10*3/mm3    Lymphocytes, Absolute 4.13 (H) 0.70 - 3.10 10*3/mm3    Monocytes, Absolute 0.71 0.10 - 0.90 10*3/mm3    Eosinophils, Absolute 0.31 0.00 - 0.40 10*3/mm3    Basophils, Absolute 0.04 0.00 - 0.20 10*3/mm3    Immature Grans, Absolute 0.01 0.00 - 0.05 10*3/mm3     nRBC 0.0 0.0 - 0.2 /100 WBC       Ordered the above labs and reviewed the results.        RADIOLOGY  CT Abdomen Pelvis Without Contrast    Result Date: 5/29/2024  CT OF THE ABDOMEN PELVIS WITHOUT CONTRAST  HISTORY: Left flank pain  COMPARISON: July 28, 2021  TECHNIQUE: Axial CT imaging was obtained through the abdomen and pelvis. No IV contrast was administered.  FINDINGS: Images through the lung bases are clear. No suspicious hepatic lesions are seen. The stomach, duodenum, adrenal glands, spleen, and pancreas are normal. Gallbladder is absent. The kidneys enhance symmetrically. No hydronephrosis is seen. There is a 2 mm nonobstructing stone within the superior pole of the left kidney. No distal ureteral or bladder stones are seen. Uterus appears normal. No adnexal masses are seen. There is colonic diverticulosis. There is no bowel obstruction. The appendix is normal. No acute osseous abnormalities are seen. There is a retroaortic left renal vein.       1. Punctate nonobstructing stone noted within the superior pole of the left kidney.  Radiation dose reduction techniques were utilized, including automated exposure control and exposure modulation based on body size.   This report was finalized on 5/29/2024 1:21 AM by Dr. aSlly Shahid M.D on Workstation: [id number]       Ordered the above noted radiological studies. Reviewed by me in PACS.            PROCEDURES  Procedures            MEDICATIONS GIVEN IN ER  Medications   sodium chloride 0.9 % bolus 1,000 mL (0 mL Intravenous Stopped 5/29/24 0247)   ketorolac (TORADOL) injection 15 mg (15 mg Intravenous Given 5/29/24 0001)   ondansetron (ZOFRAN) injection 4 mg (4 mg Intravenous Given 5/29/24 0001)                   MEDICAL DECISION MAKING, PROGRESS, and CONSULTS    All labs have been independently reviewed by me.  All radiology studies have been reviewed by me and I have also reviewed the radiology report.   EKG's independently viewed and interpreted by me.   Discussion below represents my analysis of pertinent findings related to patient's condition, differential diagnosis, treatment plan and final disposition.      Additional sources:  - Discussed/ obtained information from independent historians: History obtained from the patient herself at bedside.    - External (non-ED) record review: Upon medical records review, the patient was last seen and evaluated in the outpatient office of OB/GYN on 1/9/2024 in follow-up following her gynecological surgery performed roughly 3 weeks prior to that.    - Chronic or social conditions impacting care: None reported    - Shared decision making: Discharge decision based on shared conversations have between myself as well as the patient at bedside.      Orders placed during this visit:  Orders Placed This Encounter   Procedures    CT Abdomen Pelvis Without Contrast    Comprehensive Metabolic Panel    Lipase    hCG, Serum, Qualitative    Urinalysis With Microscopic If Indicated (No Culture) - Urine, Clean Catch    CBC Auto Differential    Urinalysis, Microscopic Only - Urine, Clean Catch    CBC & Differential           Differential diagnosis includes but is not limited to:    Acute pyelonephritis, hemorrhagic cystitis, ureterolithiasis, sepsis, acute renal insufficiency, acute renal failure, intraperitoneal free air      Independent interpretation of labs, radiology studies, and discussions with consultants:    CT scan of the abdomen and pelvis independently interpreted by myself with my interpretation showing left-sided intrarenal stones with no obvious ureteral stone or ureteral obstruction.      ED Course as of 05/29/24 0629   Wed May 29, 2024   0224 On reevaluation, the patient is resting comfortably and without any further complaints.  I informed her that her CT scan shows intrarenal stones and her urine does show blood.  She could be passing some small stones that could be causing the discomfort.  Regardless, she should drink  plenty of fluids and I will give her urology to follow-up with.  She agrees with the disposition plan and all questions answered. [BM]      ED Course User Index  [BM] Johnathan Marte MD           DIAGNOSIS  Final diagnoses:   Gross hematuria   Kidney stone on left side         DISPOSITION  DISCHARGE    Patient discharged in stable condition.    Reviewed implications of results, diagnosis, meds, responsibility to follow up, warning signs and symptoms of possible worsening, potential complications and reasons to return to ER.    Patient/Family voiced understanding of above instructions.    Discussed plan for discharge, as there is no emergent indication for admission. Patient referred to primary care provider for BP management due to today's BP. Pt/family is agreeable and understands need for follow up and repeat testing.  Pt is aware that discharge does not mean that nothing is wrong but it indicates no emergency is present that requires admission and they must continue care with follow-up as given below or physician of their choice.     FOLLOW-UP  Gordon Sosa MD  [address] [address]  [phone]    Schedule an appointment as soon as possible for a visit       [facility] UROLOG[facility]  [address] [facility] [address]  [phone]  Schedule an appointment as soon as possible for a visit            Medication List      No changes were made to your prescriptions during this visit.                   Latest Documented Vital Signs:  As of 06:29 EDT  BP- 105/55 HR- 92 Temp- 97.2 °F (36.2 °C) (Oral) O2 sat- 100%              --    Please note that portions of this were completed with a voice recognition program.       Note Disclaimer: At [facility], we believe that sharing information builds trust and better relationships. You are receiving this note because you are receiving care at [facility] or recently visited. It is possible you will see health information before a provider has  talked with you about it. This kind of information can be easy to misunderstand. To help you fully understand what it means for your health, we urge you to discuss this note with your provider.             Johnathan Marte MD  05/29/24 0694

## 2024-05-29 NOTE — ED NOTES
"Pt states \"ever since Friday, when she wipes, there is a light colored red on the toilet paper, also some internal urethra pain that is constant\"  "

## 2024-06-10 ENCOUNTER — OFFICE VISIT (OUTPATIENT)
Dept: OBSTETRICS AND GYNECOLOGY | Facility: CLINIC | Age: 44
End: 2024-06-10
Payer: COMMERCIAL

## 2024-06-10 VITALS
HEART RATE: 72 BPM | BODY MASS INDEX: 30.61 KG/M2 | HEIGHT: 67 IN | DIASTOLIC BLOOD PRESSURE: 70 MMHG | SYSTOLIC BLOOD PRESSURE: 118 MMHG | WEIGHT: 195 LBS

## 2024-06-10 DIAGNOSIS — Z98.890 S/P CONIZATION OF CERVIX: Primary | ICD-10-CM

## 2024-06-10 DIAGNOSIS — Z87.410 HISTORY OF CERVICAL DYSPLASIA: ICD-10-CM

## 2024-06-10 DIAGNOSIS — Z87.410 PERSONAL HISTORY OF CERVICAL DYSPLASIA: ICD-10-CM

## 2024-06-10 RX ORDER — HYDROCODONE BITARTRATE AND ACETAMINOPHEN 5; 325 MG/1; MG/1
TABLET ORAL
COMMUNITY
Start: 2024-05-26

## 2024-06-10 RX ORDER — HYDROXYZINE PAMOATE 25 MG/1
25 CAPSULE ORAL 3 TIMES DAILY PRN
COMMUNITY
Start: 2024-04-24 | End: 2024-06-12

## 2024-06-10 RX ORDER — DULOXETIN HYDROCHLORIDE 30 MG/1
1 CAPSULE, DELAYED RELEASE ORAL DAILY
COMMUNITY
Start: 2024-04-24 | End: 2024-06-12 | Stop reason: SDUPTHER

## 2024-06-10 RX ORDER — AMOXICILLIN 875 MG/1
1 TABLET, COATED ORAL EVERY 12 HOURS SCHEDULED
COMMUNITY
Start: 2024-06-01 | End: 2024-06-12

## 2024-06-10 NOTE — PROGRESS NOTES
CC: Follow up cervical cancer screening     SUBJECTIVE:     Yadi Syed is a 44 y.o.  who presents for cervical cancer screening following cervical conization on 23 for JERONIMO II-III extending to the endocervical margin. She is here today for pap smear and ECC for 6 month follow up after the procedure. Denies irregular vaginal bleeding or bleeding following sexual intercourse.     Past Medical History:   Diagnosis Date    Acute cholecystitis 10/22/2016    Acute sinusitis, unspecified     ADHD     NOT MEDICATED    Anxiety disorder, unspecified     Attention and concentration deficit     Bacterial infection due to H. pylori     Cervicalgia     Chills (without fever)     Chronic frontal sinusitis     Chronic low back pain     ER visits for low back pain/ back spasms;MRI L spine    Cystitis, unspecified without hematuria     Dehydration     HOSPITALIZED    Diarrhea, unspecified     Dorsalgia, unspecified     Epigastric pain     Frequency of micturition     GERD (gastroesophageal reflux disease)     Headache     Hematuria, unspecified     Kidney stone     Migraine, unspecified, not intractable, without status migrainosus     Nausea     Otalgia, bilateral     Other chronic pain     Other specified noninflammatory disorders of vagina     Papanicolaou smear     last  was normal,  2013 was abn but no procedure, +h/o with cryo x 2, (20 yo, 27 yo)    Peripheral neuropathy     Polyneuropathy, unspecified     PONV (postoperative nausea and vomiting)     PUD (peptic ulcer disease)     Radiculopathy, cervical region     Spinal headache     Trochanteric bursitis of both hips 2022    Trochanteric bursitis, unspecified hip     Unspecified nonsuppurative otitis media, bilateral       Past Surgical History:   Procedure Laterality Date    BREAST BIOPSY      CERVICAL CONIZATION N/A 2023    Procedure: CERVICAL CONIZATION;  Surgeon: Zenobia Garcia MD;  Location: [facility] MAIN OR;  Service: Obstetrics/Gynecology;   "Laterality: N/A;    CHOLECYSTECTOMY      COLONOSCOPY N/A 2023    Procedure: COLONOSCOPY to cecum with Polypectomy;  Surgeon: Francois Ha MD;  Location: [facility];  Service: Gastroenterology;  Laterality: N/A;  Adequate Prep, Ascending Polyp with a saline left, Hotsnare, Clip x2 Tattoo to ascending Colon ,Ascending polyp with biopsy x2 polyps,Transverse Polyp, Descending Polyp x2, Sigmoid Polyp x2, recto-sigmoid Polyp and Rectal polyp x2    D & C HYSTEROSCOPY N/A 2023    Procedure: HYSTEROSCOPY, DILATION AND CURETTAGE, endometrial resection WITH MYOSURE;  Surgeon: Zenobia Garcia MD;  Location: [facility] OR;  Service: Obstetrics/Gynecology;  Laterality: N/A;    ENDOSCOPY N/A 2021    Procedure: ESOPHAGOGASTRODUODENOSCOPY;  Surgeon: Francois Ha MD;  Location: [facility];  Service: Gastroenterology;  Laterality: N/A;  irregular z-line, gastritis    OTHER SURGICAL HISTORY      VUR REPAIR AT 14 YO    UTERINE FIBROID SURGERY      while pregnant at 24 yo      Social History     Tobacco Use    Smoking status: Former     Types: Cigarettes    Smokeless tobacco: Never    Tobacco comments:     moderate   Vaping Use    Vaping status: Every Day    Substances: Nicotine   Substance Use Topics    Alcohol use: Not Currently    Drug use: Never     OB History    Para Term  AB Living   3 3           SAB IAB Ectopic Molar Multiple Live Births                    # Outcome Date GA Lbr Dandre/2nd Weight Sex Type Anes PTL Lv   3 Para            2 Para            1 Para                 Review of Systems   All other systems reviewed and are negative.      OBJECTIVE:   Vitals:    06/10/24 1529   BP: 118/70   Pulse: 72   Weight: 88.5 kg (195 lb)   Height: 170.2 cm (67\")        Physical Exam  Vitals reviewed. Exam conducted with a chaperone present.   Constitutional:       General: She is not in acute distress.  HENT:      Head: Normocephalic and atraumatic.      Right Ear: External ear normal. "      Left Ear: External ear normal.   Eyes:      Extraocular Movements: Extraocular movements intact.      Pupils: Pupils are equal, round, and reactive to light.   Pulmonary:      Effort: Pulmonary effort is normal. No respiratory distress.   Genitourinary:     General: Normal vulva.      Labia:         Right: No rash, tenderness, lesion or injury.         Left: No rash, tenderness, lesion or injury.       Urethra: No prolapse or urethral swelling.      Vagina: No vaginal discharge, erythema, tenderness, bleeding or lesions.      Cervix: Normal.   Musculoskeletal:         General: No deformity. Normal range of motion.      Cervical back: Normal range of motion and neck supple.   Lymphadenopathy:      Lower Body: No right inguinal adenopathy. No left inguinal adenopathy.   Skin:     General: Skin is warm and dry.   Neurological:      General: No focal deficit present.      Mental Status: She is alert and oriented to person, place, and time.   Psychiatric:         Mood and Affect: Mood normal.         Behavior: Behavior normal.       UPT: negative     ASSESSMENT:     ICD-10-CM ICD-9-CM   1. S/P conization of cervix  Z98.890 V45.89   2. Personal history of cervical dysplasia  Z87.410 V13.22   3. History of cervical dysplasia  Z87.410 V13.22       PLAN:   Collected pap smear with HPV cotesting and ECC today for follow up of JERONIMO II-III extending to endocervical margin on cervical cone specimen per ASCCP guidelines. If returns normal, can repeat pap smear screening in 1 year in December. If abnormal, will discuss further follow up based on results which may include repeat cervical conization versus consideration for hysterectomy. All questions answered with the patient.    See below for orders    Orders Placed This Encounter   Procedures    POC Pregnancy, Urine     Order Specific Question:   Release to patient     Answer:   Routine Release [[id number]]      Return in about 6 months (around 12/10/2024) for Annual  physical.      Zenobia Garcia MD

## 2024-06-12 ENCOUNTER — OFFICE VISIT (OUTPATIENT)
Dept: INTERNAL MEDICINE | Facility: CLINIC | Age: 44
End: 2024-06-12
Payer: COMMERCIAL

## 2024-06-12 VITALS
HEART RATE: 99 BPM | SYSTOLIC BLOOD PRESSURE: 126 MMHG | DIASTOLIC BLOOD PRESSURE: 88 MMHG | TEMPERATURE: 98.6 F | OXYGEN SATURATION: 97 % | HEIGHT: 67 IN | WEIGHT: 195.2 LBS | BODY MASS INDEX: 30.64 KG/M2

## 2024-06-12 DIAGNOSIS — F32.A ANXIETY AND DEPRESSION: ICD-10-CM

## 2024-06-12 DIAGNOSIS — M51.16 LUMBAR DISC HERNIATION WITH RADICULOPATHY: ICD-10-CM

## 2024-06-12 DIAGNOSIS — M79.10 MUSCLE PAIN: ICD-10-CM

## 2024-06-12 DIAGNOSIS — N20.0 KIDNEY STONE ON LEFT SIDE: ICD-10-CM

## 2024-06-12 DIAGNOSIS — J45.20 MILD INTERMITTENT ASTHMA WITHOUT COMPLICATION: ICD-10-CM

## 2024-06-12 DIAGNOSIS — Z76.89 ENCOUNTER TO ESTABLISH CARE: ICD-10-CM

## 2024-06-12 DIAGNOSIS — F41.9 ANXIETY AND DEPRESSION: ICD-10-CM

## 2024-06-12 DIAGNOSIS — Z79.899 OTHER LONG TERM (CURRENT) DRUG THERAPY: ICD-10-CM

## 2024-06-12 DIAGNOSIS — J30.89 NON-SEASONAL ALLERGIC RHINITIS, UNSPECIFIED TRIGGER: ICD-10-CM

## 2024-06-12 DIAGNOSIS — M70.61 TROCHANTERIC BURSITIS OF BOTH HIPS: Chronic | ICD-10-CM

## 2024-06-12 DIAGNOSIS — R10.9 ACUTE LEFT FLANK PAIN: Primary | ICD-10-CM

## 2024-06-12 DIAGNOSIS — M70.62 TROCHANTERIC BURSITIS OF BOTH HIPS: Chronic | ICD-10-CM

## 2024-06-12 LAB
CYTOLOGIST CVX/VAG CYTO: NORMAL
CYTOLOGY CVX/VAG DOC CYTO: NORMAL
CYTOLOGY CVX/VAG DOC THIN PREP: NORMAL
DX ICD CODE: NORMAL
HPV I/H RISK 4 DNA CVX QL PROBE+SIG AMP: NEGATIVE
Lab: NORMAL
OTHER STN SPEC: NORMAL
STAT OF ADQ CVX/VAG CYTO-IMP: NORMAL

## 2024-06-12 PROCEDURE — 99214 OFFICE O/P EST MOD 30 MIN: CPT | Performed by: STUDENT IN AN ORGANIZED HEALTH CARE EDUCATION/TRAINING PROGRAM

## 2024-06-12 PROCEDURE — 1125F AMNT PAIN NOTED PAIN PRSNT: CPT | Performed by: STUDENT IN AN ORGANIZED HEALTH CARE EDUCATION/TRAINING PROGRAM

## 2024-06-12 RX ORDER — GABAPENTIN 300 MG/1
600 CAPSULE ORAL 2 TIMES DAILY
Qty: 90 CAPSULE | Refills: 1 | Status: SHIPPED | OUTPATIENT
Start: 2024-06-12

## 2024-06-12 RX ORDER — MONTELUKAST SODIUM 10 MG/1
10 TABLET ORAL NIGHTLY
Qty: 30 TABLET | Refills: 5 | Status: SHIPPED | OUTPATIENT
Start: 2024-06-12

## 2024-06-12 RX ORDER — ALBUTEROL SULFATE 90 UG/1
2 AEROSOL, METERED RESPIRATORY (INHALATION) EVERY 4 HOURS PRN
Qty: 18 G | Refills: 5 | Status: SHIPPED | OUTPATIENT
Start: 2024-06-12

## 2024-06-12 RX ORDER — DULOXETIN HYDROCHLORIDE 30 MG/1
30 CAPSULE, DELAYED RELEASE ORAL DAILY
Qty: 30 CAPSULE | Refills: 5 | Status: SHIPPED | OUTPATIENT
Start: 2024-06-12

## 2024-06-12 NOTE — PROGRESS NOTES
Chief Complaint  Establish care, kidney stone, asthma, back pain, hip pain    Subjective        Yadi Syed presents to [facility]  History of Present Illness  History of Present Illness  Yadi is an established patient of Génesis Azul MD, presenting to establish care with new provider and for management of flank pain, asthma, hip pain.  She has asthma, anxiety/depression,, ADHD, cervical spine pain, low back pain, bilateral hip pain, neuropathy, history of kidney stone.  She also follows with Zenobia del valle MD, OB/GYN, Francois Ha MD, gastroenterology, and a lifestyle physician who prescribes phentermine which she feels makes her tired.  She will also be seeing a urologist.    Left kidney stone: Now having intermittent left-sided flank pain.  5/29/2024 CT showing 2 mm nonobstructing stone in the superior pole of left kidney.  5/26/2024 urgent care visit she was prescribed ibuprofen 800 mg and hydrocodone 5 mg with a total of 12 tablets remaining. She typically takes half a tablet or 1 tablet when the pain is severe, but primarily takes ibuprofen 800 mg. She has not observed any urinary stones in the toilet. She has a history of renal reflux disease and underwent corrective surgery at the age of 12. Initially, she suspected a urinary tract infection, her urinalysis did not reveal any infection, but a culture confirmed the infection. She was prescribed amoxicillin, which she took for 2 days. She reports right-sided flank pain today.  This is not the first occurrence of renal calculi. She denies fever, or chills. Initially, she noticed hematuria, but this has since resolved. She denies dysuria but reports frequent urination.  Patient reports being told in the ER that they placed a referral for urology, but none is seen in our system.    Asthma/allergic rhinitis:  The patient has been experiencing flare-ups of asthma since 03/2023, which she has not had in 7  to 8 years. She attributes her asthma flare-ups recent adoption of cats.  She felt her seasonal allergy symptoms had resolved, but she began experiencing severe congestion, runny nose, and itching in her ears that she attributes to the cats. She describes a sensation of tightness in her chest, which disrupts her sleep at least 4 times a week. She purchased an over-the-counter rescue inhaler from [employer] which has not provided much relief. She purchased 2 air purifiers. Her asthma is constant at home, and she believes it is pet-related.    Bilateral trochanteric bursitis/low back pain/muscle pain: Also neck and shoulder pain.  Managed with Cymbalta and gabapentin.  She has had steroid bursa injections in the past that did provide relief and asks about repeating these.  She feels gabapentin is primarily for her bursitis, she takes 1 in the morning and 1 in the evening. If she has a bursitis flare at work, she also will take 1.  She takes baclofen as needed for a protruding disc towards her spinal cord, which occasionally flares up. She uses ice and heat for relief as well.  She requires injections for her lower back for pain as well clean.    Anxiety/depression/ADHD: Records note also possible fidelia.  Currently not taking any medications, but Cymbalta and gabapentin may be helping.  Has been sensitive to multiple medications in the past and felt she was better without them.  Most recently, she has discontinued hydroxyzine due to drowsiness.  She reports increased nervous energy, which decreases with exercise. She works 15-hour shifts as a , which requires her to sit constantly.     ROS: Denies headaches, changes in vision, changes in hearing, sore throat, shortness of breath, palpitations, nausea/vomiting/constipation/diarrhea, abdominal pain, blood in urine or stool, leg swelling.  Denies thoughts of hurting herself or others.    FH:     Medical: Mother with ovarian and cervical cancer by 37yo       "Siblings: 1 brother, colon cancer at 42yo,     Children: 3?    Reproductive: .  Pap/HPV 6/10/2024, history of abnormal Pap.  Menstrual: Irregular.  Reports hysterectomy scheduled in 1 week.    Objective   Vital Signs:  /88   Pulse 99   Temp 98.6 °F (37 °C)   Ht 170.2 cm (67\")   Wt 88.5 kg (195 lb 3.2 oz)   SpO2 97%   BMI 30.57 kg/m²   Estimated body mass index is 30.57 kg/m² as calculated from the following:    Height as of this encounter: 170.2 cm (67\").    Weight as of this encounter: 88.5 kg (195 lb 3.2 oz).             Physical Exam  Vitals and nursing note reviewed.   Constitutional:       General: She is not in acute distress.     Appearance: Normal appearance.   HENT:      Head: Normocephalic and atraumatic.   Eyes:      Extraocular Movements: Extraocular movements intact.      Conjunctiva/sclera: Conjunctivae normal.   Cardiovascular:      Rate and Rhythm: Normal rate and regular rhythm.      Heart sounds: Normal heart sounds. No murmur heard.     No friction rub. No gallop.   Pulmonary:      Effort: Pulmonary effort is normal.      Breath sounds: Normal breath sounds. No wheezing, rhonchi or rales.   Musculoskeletal:      Right lower leg: No edema.      Left lower leg: No edema.   Neurological:      General: No focal deficit present.      Mental Status: She is alert. Mental status is at baseline.   Psychiatric:         Attention and Perception: Attention and perception normal.         Mood and Affect: Mood and affect normal.         Speech: Speech is rapid and pressured.         Behavior: Behavior normal. Behavior is cooperative.       Physical Exam          Result Review :        Results                Assessment and Plan   Diagnoses and all orders for this visit:    1. Acute left flank pain (Primary)  -     Ambulatory Referral to Urology    2. Kidney stone on left side  -     Ambulatory Referral to Urology    3. Mild intermittent asthma without complication  -     albuterol sulfate "  (90 Base) MCG/ACT inhaler; Inhale 2 puffs Every 4 (Four) Hours As Needed for Wheezing.  Dispense: 18 g; Refill: 5  -     montelukast (Singulair) 10 MG tablet; Take 1 tablet by mouth Every Night.  Dispense: 30 tablet; Refill: 5    4. Non-seasonal allergic rhinitis, unspecified trigger  -     montelukast (Singulair) 10 MG tablet; Take 1 tablet by mouth Every Night.  Dispense: 30 tablet; Refill: 5    5. Trochanteric bursitis of both hips    6. Lumbar disc herniation with radiculopathy  -     gabapentin (NEURONTIN) 300 MG capsule; Take 2 capsules by mouth 2 (Two) Times a Day.  Dispense: 90 capsule; Refill: 1    7. Muscle pain  -     DULoxetine (CYMBALTA) 30 MG capsule; Take 1 capsule by mouth Daily.  Dispense: 30 capsule; Refill: 5    8. Other long term (current) drug therapy  -     Drug Profile 618186 - Urine, Clean Catch    9. Anxiety and depression    10. Encounter to establish care      Assessment & Plan  1. Flank pain/left renal stone.  A referral to Urology has been placed since no referral was identified after her ER visit.  She is having continued flank pain.    2. Asthma/allergic rhinitis.  Patient agreeable to starting Singulair to help with her asthma and allergic rhinitis symptoms.  Will also restart her albuterol inhaler for her asthma.  She may need to advance to LABA/LAMA depending on how symptoms are with these medications.  She also may need additional medication for allergy symptoms since she appears to be allergic to the cats that she now has.  She may also need referral to ENT in the future.    3.  Bilateral trochanteric bursitis:  Will continue gabapentin since she feels this gives her some relief.  She will also return for colleague to provide steroid bursal injections.  Controlled substance contract signed today.  Kwadwo checked and appropriate.    4.  Low back pain/muscle pain:  Will continue current Cymbalta, baclofen, and gabapentin.    5.   Anxiety/depression: Continue to monitor  symptoms.  Even though she is not on medication specifically for this, I suspect Cymbalta and gabapentin are providing some management.     Social/Preventative:    Risks: Personal history of colon polyps, JERONIMO 3.  Mother with ovarian and cervical cancer by 37yo.  Brother  of colon cancer at 44yo.  Father  from drug overdose.    PAP/HPV: 6/10/2024, history of abnormal Pap    Mammogram: 2023    Dexa: At 68yo    Colonoscopy: 2023, repeat in 1 year.  15 polyps found.    Vaccinations: No record of influenza or COVID.  Tdap 2017.    Eye: Deferred    Dental: Deferred    Foot exams: N/A    Nicotine: Currently vapes nicotine.  Cigarettes in the past.    Illicit drugs: Deferred    EtOH: Deferred    Home: Lives at home with  and 20yo, feels safe    Work:   at [employer].    Social: Deferred    Exercise: Deferred    Diet: Deferred, reports gaining 30 pounds in past year.      Current Outpatient Medications:     baclofen (LIORESAL) 10 MG tablet, Take 1 tablet by mouth 3 (Three) Times a Day As Needed for Muscle Spasms., Disp: 60 tablet, Rfl: 01    DULoxetine (CYMBALTA) 30 MG capsule, Take 1 capsule by mouth Daily., Disp: 30 capsule, Rfl: 5    gabapentin (NEURONTIN) 300 MG capsule, Take 2 capsules by mouth 2 (Two) Times a Day., Disp: 90 capsule, Rfl: 1    ibuprofen (ADVIL,MOTRIN) 800 MG tablet, Take 1 tablet by mouth Every 8 (Eight) Hours As Needed for Mild Pain., Disp: 30 tablet, Rfl: 0    Omega-3 Fatty Acids (fish oil) 1000 MG capsule capsule, Take  by mouth Daily With Breakfast., Disp: , Rfl:     vitamin B-12 (CYANOCOBALAMIN) 1000 MCG tablet, Take 1 tablet by mouth Daily., Disp: , Rfl:     albuterol sulfate  (90 Base) MCG/ACT inhaler, Inhale 2 puffs Every 4 (Four) Hours As Needed for Wheezing., Disp: 18 g, Rfl: 5    HYDROcodone-acetaminophen (NORCO) 5-325 MG per tablet, TAKE 1 TABLET BY MOUTH AS NEEDED EVERY SIX HOURS AS NEEDED FOR PAIN FOR UP TO THREE DAYS. MAX DAILY AMOUNT: 4  TABLETS (Patient not taking: Reported on 6/12/2024), Disp: , Rfl:     montelukast (Singulair) 10 MG tablet, Take 1 tablet by mouth Every Night., Disp: 30 tablet, Rfl: 5       Follow Up   Return in about 6 months (around 12/12/2024) for Annual physical.  Patient was given instructions and counseling regarding her condition or for health maintenance advice. Please see specific information pulled into the AVS if appropriate.     Patient or patient representative verbalized consent for the use of Ambient Listening during the visit with  Dwayne Portillo MD for chart documentation. 6/15/2024  16:07 EDT

## 2024-06-13 ENCOUNTER — TELEPHONE (OUTPATIENT)
Dept: INTERNAL MEDICINE | Facility: CLINIC | Age: 44
End: 2024-06-13
Payer: COMMERCIAL

## 2024-06-13 LAB
ACCEPTABLE CREAT UR QL: 131 MG/DL (ref 20–300)
AMPHETAMINES UR QL SCN: NEGATIVE NG/ML
BARBITURATES UR QL: NEGATIVE NG/ML
BENZODIAZ UR QL SCN: NEGATIVE NG/ML
BZE UR QL: NEGATIVE NG/ML
CANNABINOIDS UR QL SCN: NEGATIVE NG/ML
ETHANOL UR-MCNC: NEGATIVE %
MEPERIDINE UR QL: NEGATIVE NG/ML
METHADONE UR QL: NEGATIVE NG/ML
OPIATES UR QL SCN: NEGATIVE NG/ML
OXYCODONE+OXYMORPHONE UR QL SCN: NEGATIVE NG/ML
PATH REPORT.FINAL DX SPEC: NORMAL
PATH REPORT.GROSS SPEC: NORMAL
PATH REPORT.SITE OF ORIGIN SPEC: NORMAL
PATHOLOGIST NAME: NORMAL
PAYMENT PROCEDURE: NORMAL
PCP UR QL: NEGATIVE NG/ML
PROPOXYPH UR QL: NEGATIVE NG/ML
TRAMADOL UR QL SCN: NEGATIVE NG/ML

## 2024-06-13 NOTE — TELEPHONE ENCOUNTER
Talked with pharmacist.  Patient picked up brand-name Ventolin HFA yesterday.  They apologized for sending the PA.

## 2024-06-13 NOTE — TELEPHONE ENCOUNTER
PA for albuterol denied, see below    The brand formulations (ProAir HFA, Proventil HFA, Ventolin HFA) are available without prior authorization.Please have the pharmacy process the claim for one of these brand formulations.If there is a clinical reason the patient cannot use the brand formulation (such as allergy to an ingredient found in the brand that is not in the generic) then please fax the appropriate PA request along with the confirming documentation directly to [facility] at [phone].If the pharmacy is experiencing a shortage, then they may process the claim with an override.

## 2024-06-14 ENCOUNTER — TELEPHONE (OUTPATIENT)
Dept: OBSTETRICS AND GYNECOLOGY | Facility: CLINIC | Age: 44
End: 2024-06-14
Payer: COMMERCIAL

## 2024-06-14 NOTE — TELEPHONE ENCOUNTER
Pt aware of results     no cervical dysplasia was noted on biopsy and it is recommended that she follow up in 6 months for repeat pap smear.

## 2024-06-15 PROBLEM — J45.20 MILD INTERMITTENT ASTHMA WITHOUT COMPLICATION: Status: ACTIVE | Noted: 2024-06-15

## 2024-06-15 PROBLEM — J30.89 NON-SEASONAL ALLERGIC RHINITIS: Status: ACTIVE | Noted: 2024-06-15

## 2024-06-15 PROBLEM — Z87.442 PERSONAL HISTORY OF KIDNEY STONES: Status: ACTIVE | Noted: 2024-06-15

## 2024-06-15 PROBLEM — I83.813 VARICOSE VEINS OF BOTH LOWER EXTREMITIES WITH PAIN: Status: ACTIVE | Noted: 2024-06-15

## 2024-07-15 ENCOUNTER — OFFICE VISIT (OUTPATIENT)
Dept: INTERNAL MEDICINE | Facility: CLINIC | Age: 44
End: 2024-07-15
Payer: COMMERCIAL

## 2024-07-15 VITALS
RESPIRATION RATE: 16 BRPM | HEIGHT: 67 IN | DIASTOLIC BLOOD PRESSURE: 60 MMHG | SYSTOLIC BLOOD PRESSURE: 118 MMHG | HEART RATE: 83 BPM | TEMPERATURE: 98.2 F | BODY MASS INDEX: 32.33 KG/M2 | WEIGHT: 206 LBS | OXYGEN SATURATION: 100 %

## 2024-07-15 DIAGNOSIS — M70.62 TROCHANTERIC BURSITIS OF BOTH HIPS: Primary | ICD-10-CM

## 2024-07-15 DIAGNOSIS — G89.29 OTHER CHRONIC PAIN: ICD-10-CM

## 2024-07-15 DIAGNOSIS — G43.809 OTHER MIGRAINE WITHOUT STATUS MIGRAINOSUS, NOT INTRACTABLE: ICD-10-CM

## 2024-07-15 DIAGNOSIS — M70.61 TROCHANTERIC BURSITIS OF BOTH HIPS: Primary | ICD-10-CM

## 2024-07-15 PROCEDURE — 1125F AMNT PAIN NOTED PAIN PRSNT: CPT | Performed by: INTERNAL MEDICINE

## 2024-07-15 PROCEDURE — 20610 DRAIN/INJ JOINT/BURSA W/O US: CPT | Performed by: INTERNAL MEDICINE

## 2024-07-15 PROCEDURE — 99213 OFFICE O/P EST LOW 20 MIN: CPT | Performed by: INTERNAL MEDICINE

## 2024-07-15 RX ORDER — TRIAMCINOLONE ACETONIDE 40 MG/ML
80 INJECTION, SUSPENSION INTRA-ARTICULAR; INTRAMUSCULAR
Status: COMPLETED | OUTPATIENT
Start: 2024-07-15 | End: 2024-07-15

## 2024-07-15 RX ADMIN — TRIAMCINOLONE ACETONIDE 80 MG: 40 INJECTION, SUSPENSION INTRA-ARTICULAR; INTRAMUSCULAR at 12:05

## 2024-07-15 NOTE — PROGRESS NOTES
"Chief Complaint  Hip Pain (Would like injection in both hips )    Subjective        Yadi Syed presents to [facility]  Hip Pain       I am not seen her in the past historically but she has had injections in the trochanter bursa previously with good results.  She is up on her feet sometimes 14 hours a day at work and has a lot of discomfort bilaterally.  No specific injury.  She does try to do some exercises of the hips  History of chronic pain.  Reportedly not taking any hydrocodone at this time.  Off duloxetine apparently.  Is taking the gabapentin twice a day.  Objective   Vital Signs:  /60 (BP Location: Left arm, Patient Position: Sitting, Cuff Size: Large Adult)   Pulse 83   Temp 98.2 °F (36.8 °C) (Temporal)   Resp 16   Ht 170.2 cm (67\")   Wt 93.4 kg (206 lb)   SpO2 100%   BMI 32.26 kg/m²   Estimated body mass index is 32.26 kg/m² as calculated from the following:    Height as of this encounter: 170.2 cm (67\").    Weight as of this encounter: 93.4 kg (206 lb).             Physical Exam range of motion of the hips bilaterally are pretty normal particularly external rotation.  Little bit of decreased range on the right hip to external rotation.  Tenderness over the trochanter bursa bilaterally  Result Review :              Arthrocentesis    Date/Time: 7/15/2024 12:05 PM    Performed by: Timoteo Vega MD (Tony)  Authorized by: Timoteo Vega MD (Tony)  Indications: pain   Body area: hip  Joint: right hip  Local anesthesia used: yes    Anesthesia:  Local anesthesia used: yes  Local Anesthetic: lidocaine 2% without epinephrine  Anesthetic total: 2 mL    Sedation:  Patient sedated: no    Preparation: Patient was prepped and draped in the usual sterile fashion.  Needle size: 22 G  Ultrasound guidance: no  Approach: lateral  Meds administered: 80 mg triamcinolone acetonide 40 MG/ML      Arthrocentesis    Date/Time: 7/15/2024 12:05 " PM    Performed by: Timoteo Vega MD (Tony)  Authorized by: Timoteo Vega MD (Tony)  Indications: pain   Body area: hip  Joint: left hip  Local anesthesia used: yes    Anesthesia:  Local anesthesia used: yes  Local Anesthetic: lidocaine 2% without epinephrine    Sedation:  Patient sedated: no    Needle size: 22 G  Ultrasound guidance: no  Approach: lateral  Meds administered: 80 mg triamcinolone acetonide 40 MG/ML            Assessment and Plan     Diagnoses and all orders for this visit:    1. Trochanteric bursitis of both hips (Primary)  -     Arthrocentesis  -     Arthrocentesis  -     triamcinolone acetonide (KENALOG-40) injection 80 mg  -     triamcinolone acetonide (KENALOG-40) injection 80 mg    2. Other chronic pain    3. Other migraine without status migrainosus, not intractable    Lateral trochanter bursitis.  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  Pretty good range of motion so arthritis of the hip is much less likely.  We did talk about x-raying the hips if the pain continues.  She tolerated the injections very well.  Recheck if no improvement.  Encouraged continued daily hip flexor and back exercises.  Referred to "[facility]" physical therapy but she can see a physical therapist in person if she would like.  Chronic pain.  Apparently still taking the gabapentin but not any hydrocodone or duloxetine reportedly.  Refills up-to-date.  Follow-up 3 months or sooner if no improvement       Follow Up     No follow-ups on file.  Patient was given instructions and counseling regarding her condition or for health maintenance advice. Please see specific information pulled into the AVS if appropriate.

## 2024-08-09 DIAGNOSIS — M51.16 LUMBAR DISC HERNIATION WITH RADICULOPATHY: ICD-10-CM

## 2024-08-10 RX ORDER — GABAPENTIN 300 MG/1
600 CAPSULE ORAL 2 TIMES DAILY
Qty: 120 CAPSULE | Refills: 4 | Status: SHIPPED | OUTPATIENT
Start: 2024-08-10

## 2024-10-28 ENCOUNTER — OFFICE VISIT (OUTPATIENT)
Dept: INTERNAL MEDICINE | Facility: CLINIC | Age: 44
End: 2024-10-28
Payer: COMMERCIAL

## 2024-10-28 VITALS
WEIGHT: 189 LBS | BODY MASS INDEX: 29.66 KG/M2 | SYSTOLIC BLOOD PRESSURE: 112 MMHG | DIASTOLIC BLOOD PRESSURE: 60 MMHG | HEIGHT: 67 IN | OXYGEN SATURATION: 100 % | HEART RATE: 75 BPM | TEMPERATURE: 98.1 F | RESPIRATION RATE: 16 BRPM

## 2024-10-28 DIAGNOSIS — T14.8XXA BRUISING: Primary | ICD-10-CM

## 2024-10-28 DIAGNOSIS — M25.50 ARTHRALGIA, UNSPECIFIED JOINT: ICD-10-CM

## 2024-10-28 PROCEDURE — 99214 OFFICE O/P EST MOD 30 MIN: CPT | Performed by: INTERNAL MEDICINE

## 2024-10-28 PROCEDURE — 1125F AMNT PAIN NOTED PAIN PRSNT: CPT | Performed by: INTERNAL MEDICINE

## 2024-10-28 RX ORDER — PREDNISONE 20 MG/1
20 TABLET ORAL
COMMUNITY
Start: 2024-10-24 | End: 2024-10-28 | Stop reason: ALTCHOICE

## 2024-10-28 NOTE — PROGRESS NOTES
"Chief Complaint  Hospital Follow Up Visit (Knots on legs/Hip and knees ache )    Subjective        Yadi Syed presents to [facility]  History of Present Illness  Recurrent bruising pretty significant areas on the legs over the past couple of weeks.  She went to the emergency department on the 23rd and was started on prednisone.  Felt like it may have been erythema nodosum.  They were particularly painful.  The bruising predominantly has subsided somewhat.  She does have a lot of arthralgias in the hips and knees and other joints.  In July we had done a trochanter bursa injection which she did say helped some.  No known history of autoimmune disorders in the past.  Objective   Vital Signs:  /60 (BP Location: Left arm, Patient Position: Sitting, Cuff Size: Large Adult)   Pulse 75   Temp 98.1 °F (36.7 °C) (Temporal)   Resp 16   Ht 170.2 cm (67\")   Wt 85.7 kg (189 lb)   SpO2 100%   BMI 29.60 kg/m²   Estimated body mass index is 29.6 kg/m² as calculated from the following:    Height as of this encounter: 170.2 cm (67\").    Weight as of this encounter: 85.7 kg (189 lb).          Physical Exam  Vitals and nursing note reviewed.   Constitutional:       Appearance: Normal appearance.   HENT:      Head: Normocephalic and atraumatic.   Cardiovascular:      Rate and Rhythm: Normal rate and regular rhythm.   Pulmonary:      Effort: Pulmonary effort is normal.      Breath sounds: Normal breath sounds.   Abdominal:      General: Abdomen is flat.      Palpations: Abdomen is soft.   Musculoskeletal:         General: No swelling or deformity.      Cervical back: Neck supple.      Right lower leg: No edema.      Left lower leg: No edema.   Skin:     General: Skin is warm.      Capillary Refill: Capillary refill takes less than 2 seconds.      Findings: No rash.      Comments: Large bruise on the lateral left lower leg and 1 on the thigh that is resolving   Neurological: "      General: No focal deficit present.      Mental Status: She is alert and oriented to person, place, and time.        Result Review :    CMP          5/28/2024    23:35   CMP   Glucose 90    BUN 12    Creatinine 0.75    EGFR 102.1    Sodium 140    Potassium 3.7    Chloride 106    Calcium 8.9    Total Protein 6.2    Albumin 3.9    Globulin 2.3    Total Bilirubin <0.2    Alkaline Phosphatase 69    AST (SGOT) 14    ALT (SGPT) 13    Albumin/Globulin Ratio 1.7    BUN/Creatinine Ratio 16.0    Anion Gap 9.3      CBC          12/18/2023    13:13 5/28/2024    23:35   CBC   WBC 8.28  8.36    RBC 4.76  4.57    Hemoglobin 14.6  14.5    Hematocrit 44.7  43.8    MCV 93.9  95.8    MCH 30.7  31.7    MCHC 32.7  33.1    RDW 13.0  12.4    Platelets 356  300      Data reviewed : Recent hospitalization notes emergency department notes reviewed           Assessment and Plan   Diagnoses and all orders for this visit:    1. Bruising (Primary)  -     C-reactive Protein  -     Rheumatoid Factor  -     Cyclic Citrul Peptide Antibody, IgG / IgA  -     HAYDEN  -     Vitamin B12  -     Hemoglobin A1c  -     Hepatitis C Antibody    2. Arthralgia, unspecified joint    Large areas of ecchymosis/bruising.  Clinically does not seem typical for erythema nodosum based on the distribution and appearance.  She does have a chronic arthritic complaint.  Could be an autoimmune arthritis.  She does feel better on the steroids in general.  Has not necessarily had an effect on the bruising.  CBC did not show anything worrisome.  No other vasculitic changes in the lower extremities.  She does have some varicosities.  Check lab work and follow-up pending results and tentative follow-up in 4 weeks       Follow Up   Return in about 4 weeks (around 11/25/2024).  Patient was given instructions and counseling regarding her condition or for health maintenance advice. Please see specific information pulled into the AVS if appropriate.

## 2024-10-29 LAB
ANA SER QL: NEGATIVE
CCP IGA+IGG SERPL IA-ACNC: 7 UNITS (ref 0–19)
CRP SERPL-MCNC: <0.3 MG/DL (ref 0–0.5)
HBA1C MFR BLD: 4.9 % (ref 4.8–5.6)
HCV IGG SERPL QL IA: NON REACTIVE
RHEUMATOID FACT SERPL-ACNC: 10.2 IU/ML
VIT B12 SERPL-MCNC: 664 PG/ML (ref 211–946)

## 2024-11-06 ENCOUNTER — OFFICE VISIT (OUTPATIENT)
Dept: INTERNAL MEDICINE | Facility: CLINIC | Age: 44
End: 2024-11-06
Payer: COMMERCIAL

## 2024-11-06 VITALS
HEART RATE: 92 BPM | RESPIRATION RATE: 16 BRPM | BODY MASS INDEX: 29.98 KG/M2 | TEMPERATURE: 98.1 F | WEIGHT: 191 LBS | HEIGHT: 67 IN | OXYGEN SATURATION: 98 %

## 2024-11-06 DIAGNOSIS — J45.20 MILD INTERMITTENT ASTHMA WITHOUT COMPLICATION: ICD-10-CM

## 2024-11-06 DIAGNOSIS — J06.9 ACUTE URI: Primary | ICD-10-CM

## 2024-11-06 LAB
EXPIRATION DATE: NORMAL
FLUAV AG UPPER RESP QL IA.RAPID: NOT DETECTED
FLUBV AG UPPER RESP QL IA.RAPID: NOT DETECTED
INTERNAL CONTROL: NORMAL
Lab: NORMAL
SARS-COV-2 AG UPPER RESP QL IA.RAPID: NOT DETECTED

## 2024-11-06 PROCEDURE — 1125F AMNT PAIN NOTED PAIN PRSNT: CPT | Performed by: INTERNAL MEDICINE

## 2024-11-06 PROCEDURE — 99214 OFFICE O/P EST MOD 30 MIN: CPT | Performed by: INTERNAL MEDICINE

## 2024-11-06 PROCEDURE — 87428 SARSCOV & INF VIR A&B AG IA: CPT | Performed by: INTERNAL MEDICINE

## 2024-11-06 RX ORDER — AZITHROMYCIN 250 MG/1
TABLET, FILM COATED ORAL
Qty: 6 TABLET | Refills: 0 | Status: SHIPPED | OUTPATIENT
Start: 2024-11-06

## 2024-11-06 RX ORDER — PREDNISONE 20 MG/1
20 TABLET ORAL DAILY
Qty: 10 TABLET | Refills: 1 | Status: SHIPPED | OUTPATIENT
Start: 2024-11-06

## 2024-11-06 RX ORDER — DEXTROMETHORPHAN HYDROBROMIDE AND PROMETHAZINE HYDROCHLORIDE 15; 6.25 MG/5ML; MG/5ML
5 SYRUP ORAL 4 TIMES DAILY PRN
Qty: 120 ML | Refills: 1 | Status: SHIPPED | OUTPATIENT
Start: 2024-11-06

## 2024-11-06 NOTE — PROGRESS NOTES
"Chief Complaint  Sore Throat, Cough, Shortness of Breath, and Nasal Congestion    Subjective        Yadi Syed presents to [facility]  Sore Throat   Associated symptoms include coughing and shortness of breath.   Cough  Associated symptoms include a sore throat and shortness of breath.   Shortness of Breath  Associated symptoms include a sore throat.     Cough congestion and sore throat last week.  She has had ongoing fatigue and malaise with worsening of cough.  Difficult time sleeping because of the cough.  The albuterol does not seem to help much.  No shortness of breath.  No fever or chills  Objective   Vital Signs:  Pulse 92   Temp 98.1 °F (36.7 °C) (Temporal)   Resp 16   Ht 170.2 cm (67\")   Wt 86.6 kg (191 lb)   SpO2 98%   BMI 29.91 kg/m²   Estimated body mass index is 29.91 kg/m² as calculated from the following:    Height as of this encounter: 170.2 cm (67\").    Weight as of this encounter: 86.6 kg (191 lb).          Physical Exam  Vitals and nursing note reviewed.   Constitutional:       Appearance: Normal appearance.   HENT:      Head: Normocephalic and atraumatic.      Nose: Nose normal.      Mouth/Throat:      Mouth: Mucous membranes are moist.      Pharynx: Oropharynx is clear.   Eyes:      Conjunctiva/sclera: Conjunctivae normal.      Pupils: Pupils are equal, round, and reactive to light.   Cardiovascular:      Rate and Rhythm: Normal rate and regular rhythm.      Pulses: Normal pulses.      Heart sounds: Normal heart sounds. No murmur heard.     No friction rub. No gallop.   Pulmonary:      Breath sounds: Wheezing present. No rhonchi or rales.   Abdominal:      General: Abdomen is flat. There is no distension.      Palpations: Abdomen is soft.   Musculoskeletal:         General: No swelling.      Cervical back: Neck supple.      Right lower leg: No edema.      Left lower leg: No edema.   Lymphadenopathy:      Cervical: No cervical adenopathy. "   Skin:     General: Skin is warm and dry.      Capillary Refill: Capillary refill takes 2 to 3 seconds.      Coloration: Skin is not cyanotic.      Nails: There is no clubbing.   Neurological:      Mental Status: She is alert.        Result Review :      Data reviewed : Recent hospitalization notes emergency department notes           Assessment and Plan   Diagnoses and all orders for this visit:    1. Acute URI (Primary)  -     Covid-19 + Flu A&B AG, Veritor    2. Mild intermittent asthma without complication    Other orders  -     predniSONE (DELTASONE) 20 MG tablet; Take 1 tablet by mouth Daily.  Dispense: 10 tablet; Refill: 1  -     promethazine-dextromethorphan (PROMETHAZINE-DM) 6.25-15 MG/5ML syrup; Take 5 mL by mouth 4 (Four) Times a Day As Needed for Cough.  Dispense: 120 mL; Refill: 1  -     azithromycin (Zithromax Z-Raul) 250 MG tablet; Take 2 tablets by mouth on day 1, then 1 tablet daily on days 2-5  Dispense: 6 tablet; Refill: 0    URI and reactive airway disease.  No pneumonia clinically.  Did not appear to be any significant distress.  Albuterol as needed.  Trelegy sample given and instructed on use.  She took her first dose in the office.  Prednisone and Promethazine DM prescription given.  Follow-up if no improvement or any worsening of symptoms.  POCT COVID and flu testing negative         Follow Up   No follow-ups on file.  Patient was given instructions and counseling regarding her condition or for health maintenance advice. Please see specific information pulled into the AVS if appropriate.

## 2024-11-16 DIAGNOSIS — J45.20 MILD INTERMITTENT ASTHMA WITHOUT COMPLICATION: ICD-10-CM

## 2024-11-18 RX ORDER — ALBUTEROL SULFATE 90 UG/1
2 AEROSOL, METERED RESPIRATORY (INHALATION) EVERY 4 HOURS PRN
Qty: 18 G | Refills: 0 | Status: SHIPPED | OUTPATIENT
Start: 2024-11-18

## 2024-11-25 ENCOUNTER — OFFICE VISIT (OUTPATIENT)
Dept: INTERNAL MEDICINE | Facility: CLINIC | Age: 44
End: 2024-11-25
Payer: COMMERCIAL

## 2024-11-25 VITALS
HEIGHT: 67 IN | OXYGEN SATURATION: 97 % | WEIGHT: 190.8 LBS | DIASTOLIC BLOOD PRESSURE: 78 MMHG | HEART RATE: 88 BPM | SYSTOLIC BLOOD PRESSURE: 128 MMHG | BODY MASS INDEX: 29.95 KG/M2

## 2024-11-25 DIAGNOSIS — M25.50 ARTHRALGIA, UNSPECIFIED JOINT: Primary | ICD-10-CM

## 2024-11-25 DIAGNOSIS — G89.29 OTHER CHRONIC PAIN: ICD-10-CM

## 2024-11-25 PROCEDURE — 99214 OFFICE O/P EST MOD 30 MIN: CPT | Performed by: INTERNAL MEDICINE

## 2024-11-25 PROCEDURE — 1125F AMNT PAIN NOTED PAIN PRSNT: CPT | Performed by: INTERNAL MEDICINE

## 2024-11-25 NOTE — PROGRESS NOTES
"Chief Complaint  Follow-up (4 weeks )    Subjective        Yadi Syed presents to [facility]  History of Present Illness  She felt a lot better on the prednisone did seem to help with the nodules and also with the arthralgias and myalgias.  She has been off the Cymbalta for quite some time but has been thinking about restarting it.  She tries to follow an anti-inflammatory diet  Objective   Vital Signs:  /78 (BP Location: Right arm, Patient Position: Sitting, Cuff Size: Adult)   Pulse 88   Ht 170.2 cm (67\")   Wt 86.5 kg (190 lb 12.8 oz)   SpO2 97%   BMI 29.88 kg/m²   Estimated body mass index is 29.88 kg/m² as calculated from the following:    Height as of this encounter: 170.2 cm (67\").    Weight as of this encounter: 86.5 kg (190 lb 12.8 oz).          Physical Exam  Psychiatric:         Mood and Affect: Mood normal.         Behavior: Behavior normal.         Thought Content: Thought content normal.         Judgment: Judgment normal.        Result Review :    CMP          5/28/2024    23:35   CMP   Glucose 90    BUN 12    Creatinine 0.75    EGFR 102.1    Sodium 140    Potassium 3.7    Chloride 106    Calcium 8.9    Total Protein 6.2    Albumin 3.9    Globulin 2.3    Total Bilirubin <0.2    Alkaline Phosphatase 69    AST (SGOT) 14    ALT (SGPT) 13    Albumin/Globulin Ratio 1.7    BUN/Creatinine Ratio 16.0    Anion Gap 9.3      CBC          12/18/2023    13:13 5/28/2024    23:35   CBC   WBC 8.28  8.36    RBC 4.76  4.57    Hemoglobin 14.6  14.5    Hematocrit 44.7  43.8    MCV 93.9  95.8    MCH 30.7  31.7    MCHC 32.7  33.1    RDW 13.0  12.4    Platelets 356  300                Assessment and Plan   Diagnoses and all orders for this visit:    1. Arthralgia, unspecified joint (Primary)    2. Other chronic pain    Other orders  -     Naltrexone HCl, Pain, 1.5 MG capsule; Take 3 mg by mouth Daily.  Dispense: 60 capsule; Refill: 2    Seronegative " spondyloarthritis.  May have an FMS component as well.  She was on the Cymbalta and thinking about restarting that.  She does not like to take medications regularly if she can help it.  Exquisite response to the prednisone.  Serology negative for rheumatoid arthritis.  Consider hydroxychloroquine.  Low-dose naltrexone prescription, recheck in 4 weeks if does not respond to this consider restarting the Cymbalta and even trial of hydroxychloroquine         Follow Up   Return in about 4 weeks (around 12/23/2024).  Patient was given instructions and counseling regarding her condition or for health maintenance advice. Please see specific information pulled into the AVS if appropriate.

## 2025-02-13 DIAGNOSIS — M51.16 LUMBAR DISC HERNIATION WITH RADICULOPATHY: ICD-10-CM

## 2025-02-13 NOTE — TELEPHONE ENCOUNTER
Rx Refill Note  Requested Prescriptions     Pending Prescriptions Disp Refills    gabapentin (NEURONTIN) 300 MG capsule 120 capsule 4     Sig: Take 2 capsules by mouth 2 (Two) Times a Day.      Last office visit with prescribing clinician: 11/25/2024   Last telemedicine visit with prescribing clinician: Visit date not found   Next office visit with prescribing clinician: Visit date not found     Drug Profile 170333 - Urine, Clean Catch (06/12/2024 13:47)

## 2025-02-14 RX ORDER — GABAPENTIN 300 MG/1
600 CAPSULE ORAL 2 TIMES DAILY
Qty: 120 CAPSULE | Refills: 4 | OUTPATIENT
Start: 2025-02-14

## 2025-02-21 NOTE — TELEPHONE ENCOUNTER
Patient was scheduled with Selin for a med check on 02/24/2025. I called the patient and advised that we would need to reschedule. She stated that she's going to be kicked off her Medicaid plan at the end of this month. Her new coverage will not be effective until 05/01/2025. Stating she's wanting only the gabapentin refilled at the moment. I will advise of [facility] discounts, but please advise if there's another option? Thank you

## 2025-03-07 ENCOUNTER — OFFICE VISIT (OUTPATIENT)
Dept: INTERNAL MEDICINE | Facility: CLINIC | Age: 45
End: 2025-03-07
Payer: COMMERCIAL

## 2025-03-07 VITALS
OXYGEN SATURATION: 98 % | TEMPERATURE: 97.9 F | DIASTOLIC BLOOD PRESSURE: 64 MMHG | HEIGHT: 67 IN | RESPIRATION RATE: 18 BRPM | HEART RATE: 83 BPM | WEIGHT: 186.6 LBS | SYSTOLIC BLOOD PRESSURE: 108 MMHG | BODY MASS INDEX: 29.29 KG/M2

## 2025-03-07 DIAGNOSIS — M70.62 TROCHANTERIC BURSITIS OF BOTH HIPS: ICD-10-CM

## 2025-03-07 DIAGNOSIS — F32.A ANXIETY AND DEPRESSION: ICD-10-CM

## 2025-03-07 DIAGNOSIS — M51.16 LUMBAR DISC HERNIATION WITH RADICULOPATHY: ICD-10-CM

## 2025-03-07 DIAGNOSIS — F41.9 ANXIETY AND DEPRESSION: ICD-10-CM

## 2025-03-07 DIAGNOSIS — Z12.11 COLON CANCER SCREENING: ICD-10-CM

## 2025-03-07 DIAGNOSIS — M70.61 TROCHANTERIC BURSITIS OF BOTH HIPS: ICD-10-CM

## 2025-03-07 DIAGNOSIS — G62.9 POLYNEUROPATHY, UNSPECIFIED: Primary | ICD-10-CM

## 2025-03-07 DIAGNOSIS — M54.12 RADICULOPATHY, CERVICAL REGION: ICD-10-CM

## 2025-03-07 RX ORDER — GABAPENTIN 300 MG/1
600 CAPSULE ORAL 2 TIMES DAILY
Qty: 120 CAPSULE | Refills: 5 | Status: SHIPPED | OUTPATIENT
Start: 2025-03-07

## 2025-03-07 RX ORDER — TIZANIDINE 2 MG/1
2 TABLET ORAL 2 TIMES DAILY
Qty: 60 TABLET | Refills: 3 | Status: SHIPPED | OUTPATIENT
Start: 2025-03-07

## 2025-03-07 NOTE — PROGRESS NOTES
"Chief Complaint  Med Refill (Follow up visit ) and Pain (Would like shots in hips, and neck is locking up /)    Subjective        Yadi Syed presents to [facility]  Pain    She needs a med refill on her gabapentin.  It has been about 4 months and she has been in the office and she takes it for mood stabilization and chronic pain.  She is also interested in scheduling an appointment for hip injections.  The Kenalog injections did help with her trochanter bursitis bilaterally.  She is having a lot of cervical neck tightness and tenderness and she admits to holding a lot of stress in the neck  Objective   Vital Signs:  /64 (BP Location: Left arm, Patient Position: Sitting, Cuff Size: Adult)   Pulse 83   Temp 97.9 °F (36.6 °C) (Temporal)   Resp 18   Ht 170.2 cm (67\")   Wt 84.6 kg (186 lb 9.6 oz)   SpO2 98%   BMI 29.23 kg/m²   Estimated body mass index is 29.23 kg/m² as calculated from the following:    Height as of this encounter: 170.2 cm (67\").    Weight as of this encounter: 84.6 kg (186 lb 9.6 oz).          Physical Exam  Psychiatric:         Mood and Affect: Mood normal.         Behavior: Behavior normal.         Thought Content: Thought content normal.         Judgment: Judgment normal.        Result Review :    CMP          5/28/2024    23:35   CMP   Glucose 90    BUN 12    Creatinine 0.75    EGFR 102.1    Sodium 140    Potassium 3.7    Chloride 106    Calcium 8.9    Total Protein 6.2    Albumin 3.9    Globulin 2.3    Total Bilirubin <0.2    Alkaline Phosphatase 69    AST (SGOT) 14    ALT (SGPT) 13    Albumin/Globulin Ratio 1.7    BUN/Creatinine Ratio 16.0    Anion Gap 9.3      CBC          5/28/2024    23:35   CBC   WBC 8.36    RBC 4.57    Hemoglobin 14.5    Hematocrit 43.8    MCV 95.8    MCH 31.7    MCHC 33.1    RDW 12.4    Platelets 300                Assessment and Plan   Diagnoses and all orders for this visit:    1. Polyneuropathy, unspecified " (Primary)    2. Lumbar disc herniation with radiculopathy  -     gabapentin (NEURONTIN) 300 MG capsule; Take 2 capsules by mouth 2 (Two) Times a Day.  Dispense: 120 capsule; Refill: 5    3. Anxiety and depression    4. Trochanteric bursitis of both hips    5. Radiculopathy, cervical region    Other orders  -     tiZANidine (ZANAFLEX) 2 MG tablet; Take 1 tablet by mouth 2 (Two) Times a Day.  Dispense: 60 tablet; Refill: 3    Chronic pain in fibromyalgia type pain.  The gabapentin works well for her and new prescription sent in.  She is also having a lot of neck tightness which admittedly she holds a lot of stress in her neck.  Tizanidine prescription sent in.  She is working on some neck exercises.  She is can return for injections of the hip.  Schedule colonoscopy as she has family history of colon cancer and has had multiple polyps on previous         Follow Up   Return in about 2 weeks (around 3/21/2025).  Patient was given instructions and counseling regarding her condition or for health maintenance advice. Please see specific information pulled into the AVS if appropriate.

## 2025-03-19 ENCOUNTER — OFFICE VISIT (OUTPATIENT)
Dept: INTERNAL MEDICINE | Facility: CLINIC | Age: 45
End: 2025-03-19
Payer: COMMERCIAL

## 2025-03-19 VITALS
WEIGHT: 186 LBS | TEMPERATURE: 98.3 F | DIASTOLIC BLOOD PRESSURE: 72 MMHG | HEART RATE: 82 BPM | HEIGHT: 67 IN | RESPIRATION RATE: 16 BRPM | SYSTOLIC BLOOD PRESSURE: 110 MMHG | BODY MASS INDEX: 29.19 KG/M2 | OXYGEN SATURATION: 94 %

## 2025-03-19 DIAGNOSIS — M25.561 CHRONIC PAIN OF RIGHT KNEE: ICD-10-CM

## 2025-03-19 DIAGNOSIS — M70.61 TROCHANTERIC BURSITIS OF BOTH HIPS: Primary | ICD-10-CM

## 2025-03-19 DIAGNOSIS — G89.29 CHRONIC PAIN OF RIGHT KNEE: ICD-10-CM

## 2025-03-19 DIAGNOSIS — M70.62 TROCHANTERIC BURSITIS OF BOTH HIPS: Primary | ICD-10-CM

## 2025-03-19 RX ORDER — TRIAMCINOLONE ACETONIDE 40 MG/ML
80 INJECTION, SUSPENSION INTRA-ARTICULAR; INTRAMUSCULAR
Status: COMPLETED | OUTPATIENT
Start: 2025-03-19 | End: 2025-03-19

## 2025-03-19 RX ADMIN — TRIAMCINOLONE ACETONIDE 80 MG: 40 INJECTION, SUSPENSION INTRA-ARTICULAR; INTRAMUSCULAR at 16:05

## 2025-03-19 RX ADMIN — TRIAMCINOLONE ACETONIDE 80 MG: 40 INJECTION, SUSPENSION INTRA-ARTICULAR; INTRAMUSCULAR at 16:06

## 2025-03-19 NOTE — PROGRESS NOTES
"Chief Complaint  Injections (Both hips) and Knee Pain (Right knee)    Subjective        Yadi Syed presents to [facility]  Knee Pain       Bilateral hip pain which has been ongoing.  She has had them injected before with Kenalog and it has helped significantly.  She walks a lot throughout the day at work and also at home.  She is having some right knee pain in the anterior part of the knee.  Not give out but sometimes feels like it is going to.  No swelling  Objective   Vital Signs:  /72 (BP Location: Left arm, Patient Position: Sitting, Cuff Size: Large Adult)   Pulse 82   Temp 98.3 °F (36.8 °C) (Temporal)   Resp 16   Ht 170.2 cm (67\")   Wt 84.4 kg (186 lb)   SpO2 94%   BMI 29.13 kg/m²   Estimated body mass index is 29.13 kg/m² as calculated from the following:    Height as of this encounter: 170.2 cm (67\").    Weight as of this encounter: 84.4 kg (186 lb).          Physical Exam  Musculoskeletal:      Right knee: No swelling or bony tenderness. Normal range of motion. Tenderness present over the medial joint line. No LCL laxity, MCL laxity, ACL laxity or PCL laxity. Normal alignment and normal meniscus.      Instability Tests: Anterior Lachman test negative. Medial Deon test negative and lateral Deon test negative.      Result Review :    CMP          5/28/2024    23:35   CMP   Glucose 90    BUN 12    Creatinine 0.75    EGFR 102.1    Sodium 140    Potassium 3.7    Chloride 106    Calcium 8.9    Total Protein 6.2    Albumin 3.9    Globulin 2.3    Total Bilirubin <0.2    Alkaline Phosphatase 69    AST (SGOT) 14    ALT (SGPT) 13    Albumin/Globulin Ratio 1.7    BUN/Creatinine Ratio 16.0    Anion Gap 9.3      CBC          5/28/2024    23:35   CBC   WBC 8.36    RBC 4.57    Hemoglobin 14.5    Hematocrit 43.8    MCV 95.8    MCH 31.7    MCHC 33.1    RDW 12.4    Platelets 300         Arthrocentesis    Date/Time: 3/19/2025 4:05 PM    Performed by: " Timoteo Vega MD (Tony)  Authorized by: Timoteo Vega MD (Tony)  Indications: pain   Body area: hip  Joint: right hip  Local anesthesia used: yes    Anesthesia:  Local anesthesia used: yes  Local Anesthetic: lidocaine 2% without epinephrine    Sedation:  Patient sedated: no    Preparation: Patient was prepped and draped in the usual sterile fashion.  Needle gauge: 25.  Aspirate amount: 0 mL  Meds administered: 80 mg triamcinolone acetonide 40 MG/ML      Arthrocentesis    Date/Time: 3/19/2025 4:06 PM    Performed by: Timoteo Vega MD (Tony)  Authorized by: Timoteo Vega MD (Tony)  Indications: pain   Body area: hip  Joint: left hip  Local anesthesia used: yes    Anesthesia:  Local anesthesia used: yes  Local Anesthetic: lidocaine 2% without epinephrine    Sedation:  Patient sedated: no    Preparation: Patient was prepped and draped in the usual sterile fashion.  Needle gauge: 25.  Aspirate amount: 0 mL  Meds administered: 80 mg triamcinolone acetonide 40 MG/ML  Patient tolerance: patient tolerated the procedure well with no immediate complications            Assessment and Plan   Diagnoses and all orders for this visit:    1. Trochanteric bursitis of both hips (Primary)    2. Chronic pain of right knee    Bilateral trochanter bursitis.  She has done well in the past with Kenalog injection and she was interested in doing both hips today.  We talked about hip stretching exercises.  She does a lot of walking.  She tolerated the injections well.  Also having some right knee pain which on the medial aspect and has some meniscal characteristics.  Joint otherwise feels stable on examination.  We talked about stationary bicycle to strengthen the knee.  We can MRI of the knee at any point but she is not ready to do that.  Follow-up if any problems or persistence       Follow Up   No follow-ups on file.  Patient was given instructions and counseling regarding her condition or for health  maintenance advice. Please see specific information pulled into the AVS if appropriate.

## 2025-03-26 ENCOUNTER — HOSPITAL ENCOUNTER (EMERGENCY)
Facility: HOSPITAL | Age: 45
Discharge: HOME | End: 2025-03-26
Attending: EMERGENCY MEDICINE | Admitting: EMERGENCY MEDICINE
Payer: COMMERCIAL

## 2025-03-26 ENCOUNTER — APPOINTMENT (OUTPATIENT)
Dept: GENERAL RADIOLOGY | Facility: HOSPITAL | Age: 45
End: 2025-03-26
Payer: COMMERCIAL

## 2025-03-26 VITALS
BODY MASS INDEX: 29.2 KG/M2 | WEIGHT: 186.07 LBS | HEIGHT: 67 IN | HEART RATE: 75 BPM | SYSTOLIC BLOOD PRESSURE: 114 MMHG | OXYGEN SATURATION: 97 % | TEMPERATURE: 97.1 F | RESPIRATION RATE: 16 BRPM | DIASTOLIC BLOOD PRESSURE: 70 MMHG

## 2025-03-26 DIAGNOSIS — S89.91XA INJURY OF RIGHT LOWER EXTREMITY, INITIAL ENCOUNTER: Primary | ICD-10-CM

## 2025-03-26 PROCEDURE — 73590 X-RAY EXAM OF LOWER LEG: CPT

## 2025-03-26 PROCEDURE — 99283 EMERGENCY DEPT VISIT LOW MDM: CPT

## 2025-03-26 PROCEDURE — 90715 TDAP VACCINE 7 YRS/> IM: CPT | Performed by: EMERGENCY MEDICINE

## 2025-03-26 PROCEDURE — 25010000002 TETANUS-DIPHTH-ACELL PERTUSSIS 5-2.5-18.5 LF-MCG/0.5 SUSPENSION PREFILLED SYRINGE: Performed by: EMERGENCY MEDICINE

## 2025-03-26 PROCEDURE — 90471 IMMUNIZATION ADMIN: CPT | Performed by: EMERGENCY MEDICINE

## 2025-03-26 RX ADMIN — TETANUS TOXOID, REDUCED DIPHTHERIA TOXOID AND ACELLULAR PERTUSSIS VACCINE, ADSORBED 0.5 ML: 5; 2.5; 8; 8; 2.5 SUSPENSION INTRAMUSCULAR at 21:52

## 2025-03-26 NOTE — ED TRIAGE NOTES
Pt reports injury to rt leg, injured it when fell down a step, states has a swollen area that will go down when she elevates it but it will swell again when she stands up, occurred this afternoon

## 2025-03-26 NOTE — Clinical Note
[facility] EMERGENCY DEPARTMENT  [address] [facility] [address]  Phone: [phone]    Yadi ySed was seen and treated in our emergency department on 3/26/2025.  She may return to work on 03/28/2025.         Thank you for choosing [facility].    Ashley Chung PA-C

## 2025-03-27 NOTE — ED PROVIDER NOTES
EMERGENCY DEPARTMENT ENCOUNTER  Room Number:  35/35  PCP: Timoteo Vega MD (Tony)  Independent Historians: Patient      HPI:  Chief Complaint: had concerns including Leg Injury.     A complete HPI/ROS/PMH/PSH/SH/FH are unobtainable due to: None    Chronic or social conditions impacting patient care (Social Determinants of Health): None      Context: Yadi Syed is a 45 y.o. female with a medical history of peptic ulcer disease, migraine headache, GERD, anxiety, depression who presents to the ED c/o acute right leg injury.  Patient reports she was going up the steps earlier today when she slipped and hit her right shin on the stairs.  No head injury or LOC.  Developed bruising and contusion over the distal right shin.  She has been able to ambulate since the fall.  Patient has tried elevating and icing the leg to help with pain.  When she stands up, the leg becomes swollen.  She is concerned she has developed DVT.  No history of DVT/PE.  She is not anticoagulated.  No other systemic complaints at this time.      Review of prior external notes (non-ED) -and- Review of prior external test results outside of this encounter:  Patient seen in office by PCP on 3/19/2025 for trochanteric bursitis.  Reviewed assessment and plan.  Patient received Kenalog injection in both knees.  Reviewed labs collected on 12/11/2024.  CBC with hemoglobin 13.6, CMP with creatinine 0.66.    Prescription drug monitoring program review:     N/A    PAST MEDICAL HISTORY  Active Ambulatory Problems     Diagnosis Date Noted    Radiculopathy, cervical region     PUD (peptic ulcer disease)     Polyneuropathy, unspecified     Other chronic pain     Migraine, unspecified, not intractable, without status migrainosus     GERD (gastroesophageal reflux disease)     Attention and concentration deficit     Anxiety and depression     Chronic low back pain     Early satiety 08/12/2021    H. pylori infection 08/12/2021    Lumbar disc herniation with  radiculopathy 09/14/2021    Trochanteric bursitis of both hips 04/05/2022    Encounter for screening for malignant neoplasm of colon 06/06/2023    Family history of colon cancer 06/06/2023    Severe dysplasia of cervix (JERONIMO III) 11/21/2023    Abnormal uterine bleeding (AUB) 11/21/2023    Non-seasonal allergic rhinitis 06/15/2024    Mild intermittent asthma without complication 06/15/2024    Personal history of kidney stones 06/15/2024    Varicose veins of both lower extremities with pain 06/15/2024     Resolved Ambulatory Problems     Diagnosis Date Noted    Acute cholecystitis 10/22/2016    Endometrial polyp 11/21/2023     Past Medical History:   Diagnosis Date    Acute sinusitis, unspecified     ADHD     Anxiety disorder, unspecified     Bacterial infection due to H. pylori     Cervicalgia     Chills (without fever)     Chronic frontal sinusitis     Cystitis, unspecified without hematuria     Dehydration     Diarrhea, unspecified     Dorsalgia, unspecified     Epigastric pain     Frequency of micturition     Headache     Hematuria, unspecified     Kidney stone     Nausea     Otalgia, bilateral     Other specified noninflammatory disorders of vagina     Papanicolaou smear     Peripheral neuropathy     PONV (postoperative nausea and vomiting)     Spinal headache     Trochanteric bursitis, unspecified hip     Unspecified nonsuppurative otitis media, bilateral          PAST SURGICAL HISTORY  Past Surgical History:   Procedure Laterality Date    BREAST BIOPSY      CERVICAL CONIZATION N/A 12/20/2023    Procedure: CERVICAL CONIZATION;  Surgeon: Zenobia Garcia MD;  Location: [facility] MAIN OR;  Service: Obstetrics/Gynecology;  Laterality: N/A;    CHOLECYSTECTOMY      COLONOSCOPY N/A 06/12/2023    Procedure: COLONOSCOPY to cecum with Polypectomy;  Surgeon: Francois Ha MD;  [facility];  Service: Gastroenterology;  Laterality: N/A;  Adequate Prep, Ascending Polyp with a saline left, Hotsnare, Clip x2 Tattoo  to ascending Colon ,Ascending polyp with biopsy x2 polyps,Transverse Polyp, Descending Polyp x2, Sigmoid Polyp x2, recto-sigmoid Polyp and Rectal polyp x2    D & C HYSTEROSCOPY N/A 12/20/2023    Procedure: HYSTEROSCOPY, DILATION AND CURETTAGE, endometrial resection WITH MYOSURE;  Surgeon: Zenobia Garcia MD;  Location: [facility] MAIN OR;  Service: Obstetrics/Gynecology;  Laterality: N/A;    ENDOSCOPY N/A 08/30/2021    Procedure: ESOPHAGOGASTRODUODENOSCOPY;  Surgeon: Francois Ha MD;  Location: [facility] MAIN OR;  Service: Gastroenterology;  Laterality: N/A;  irregular z-line, gastritis    OTHER SURGICAL HISTORY      VUR REPAIR AT 14 YO    UTERINE FIBROID SURGERY      while pregnant at 22 yo         FAMILY HISTORY  Family History   Problem Relation Age of Onset    Ovarian cancer Mother     Colon polyps Mother     Drug abuse Father         NOT IN HER LIFE    Colon cancer Brother     Stroke Maternal Aunt     Thyroid disease Maternal Grandmother     Stroke Paternal Grandmother     Crohn's disease Neg Hx     Irritable bowel syndrome Neg Hx     Ulcerative colitis Neg Hx     Malig Hyperthermia Neg Hx          SOCIAL HISTORY  Social History     Socioeconomic History    Marital status:    Tobacco Use    Smoking status: Former     Types: Cigarettes     Passive exposure: Never    Smokeless tobacco: Never    Tobacco comments:     moderate   Vaping Use    Vaping status: Every Day    Substances: Nicotine   Substance and Sexual Activity    Alcohol use: Not Currently    Drug use: Never    Sexual activity: Yes         ALLERGIES  Patient has no known allergies.      REVIEW OF SYSTEMS  Included in HPI  All systems reviewed and negative except for those discussed in HPI.      PHYSICAL EXAM    I have reviewed the triage vital signs and nursing notes.    ED Triage Vitals [03/26/25 1902]   Temp Heart Rate Resp BP SpO2   97.1 °F (36.2 °C) 101 16 -- 97 %      Temp src Heart Rate Source Patient Position BP Location FiO2 (%)   -- -- --  -- --       Physical Exam  Constitutional:       General: She is not in acute distress.     Appearance: She is well-developed.   HENT:      Head: Normocephalic and atraumatic.   Eyes:      Extraocular Movements: Extraocular movements intact.   Cardiovascular:      Rate and Rhythm: Normal rate and regular rhythm.      Heart sounds: Normal heart sounds.   Pulmonary:      Effort: Pulmonary effort is normal.      Breath sounds: Normal breath sounds.   Abdominal:      General: There is no distension.   Musculoskeletal:        Legs:    Skin:     General: Skin is warm.   Neurological:      General: No focal deficit present.      Mental Status: She is alert and oriented to person, place, and time.   Psychiatric:         Mood and Affect: Mood normal.             RADIOLOGY  XR Tibia Fibula 2 View Right  Result Date: 3/26/2025  XR TIBIA FIBULA 2 VW RIGHT-   HISTORY:   swelling/injury to distal tibia after tripping on stairs  TECHNIQUE:  Two views of the right tibia and fibula.  FINDINGS:  Negative for acute fracture, dislocation, or radiopaque foreign body.       No evidence of acute bony abnormality.  This report was finalized on 3/26/2025 10:05 PM by Dr. Alfonso Ely M.D on Workstation: [id number]          MEDICATIONS GIVEN IN ER  Medications   Tetanus-Diphth-Acell Pertussis (BOOSTRIX) injection 0.5 mL (0.5 mL Intramuscular Given 3/26/25 2152)         ORDERS PLACED DURING THIS VISIT:  Orders Placed This Encounter   Procedures    XR Tibia Fibula 2 View Right         OUTPATIENT MEDICATION MANAGEMENT:  No current Epic-ordered facility-administered medications on file.     Current Outpatient Medications Ordered in Epic   Medication Sig Dispense Refill    azithromycin (Zithromax Z-Raul) 250 MG tablet Take 2 tablets by mouth on day 1, then 1 tablet daily on days 2-5 (Patient not taking: Reported on 3/19/2025) 6 tablet 0    gabapentin (NEURONTIN) 300 MG capsule Take 2 capsules by mouth 2 (Two) Times a Day. 120 capsule 5     ibuprofen (ADVIL,MOTRIN) 800 MG tablet Take 1 tablet by mouth Every 8 (Eight) Hours As Needed for Mild Pain. 30 tablet 0    MAGNESIUM PO Take  by mouth.      naproxen (NAPROSYN) 375 MG tablet Take 1 tablet by mouth 2 (Two) Times a Day With Meals. (Patient not taking: Reported on 3/19/2025)      promethazine-dextromethorphan (PROMETHAZINE-DM) 6.25-15 MG/5ML syrup Take 5 mL by mouth 4 (Four) Times a Day As Needed for Cough. (Patient not taking: Reported on 3/19/2025) 120 mL 1    tiZANidine (ZANAFLEX) 2 MG tablet Take 1 tablet by mouth 2 (Two) Times a Day. 60 tablet 3    Ventolin  (90 Base) MCG/ACT inhaler INHALE 2 PUFFS BY MOUTH EVERY 4 HOURS AS NEEDED FOR WHEEZING 18 g 0    vitamin B-12 (CYANOCOBALAMIN) 1000 MCG tablet Take 1 tablet by mouth Daily.             PROGRESS, DATA ANALYSIS, CONSULTS, AND MEDICAL DECISION MAKING  All labs have been independently interpreted by me.  All radiology studies have been reviewed by me. All EKG's have been independently viewed and interpreted by me.  Discussion below represents my analysis of pertinent findings related to patient's condition, differential diagnosis, treatment plan and final disposition.    Differential diagnosis includes but is not limited to leg contusion, leg abrasion, leg fracture.        ED Course as of 03/27/25 0657   Wed Mar 26, 2025   2132 Patient has contusion on the right anterior shin, obtaining x-ray imaging to rule out fracture.  Patient reports concern for DVT.  I had extensive discussion with patient and discussed extremely low suspicion for DVT given history and physical exam is not consistent with DVT.  Patient continues to have concern for DVT.  I discussed we do not currently have ultrasound imaging here at this time but I am happy to order outpatient ultrasound to rule out DVT that will occur in the morning.  Patient is agreeable with this. [JG]   2133 X-ray right tibia-fibula independently interpreted by me as no fracture [MP]      ED  Course User Index  [JG] Conor Montemayor MD  [MP] Ashley Chung PA-C             AS OF 06:57 EDT VITALS:    BP - 114/70  HR - 75  TEMP - 97.1 °F (36.2 °C)  O2 SATS - 97%    COMPLEXITY OF CARE  Admission was considered but after careful review of the patient's presentation, physical examination, diagnostic results, and response to treatment the patient may be safely discharged with outpatient follow-up.      DIAGNOSIS  Final diagnoses:   Injury of right lower extremity, initial encounter         DISPOSITION  ED Disposition       ED Disposition   Discharge    Condition   Stable    Comment   --                Please note that portions of this document were completed with a voice recognition program.    Note Disclaimer: At [facility], we believe that sharing information builds trust and better relationships. You are receiving this note because you recently visited [facility]. It is possible you will see health information before a provider has talked with you about it. This kind of information can be easy to misunderstand. To help you fully understand what it means for your health, we urge you to discuss this note with your provider.     Ashley Chung PA-C  03/27/25 0700

## 2025-03-27 NOTE — ED PROVIDER NOTES
MD ATTESTATION NOTE  I supervised care provided by the midlevel provider. We have discussed this patient's history, physical exam, and treatment plan. I have reviewed the midlevel provider's note and I agree with the midlevel provider's findings and plan of care.   SHARED VISIT: This visit was performed by BOTH a physician and an APC. The substantive portion of the medical decision making was performed by this attesting physician who made or approved the management plan and takes responsibility for patient management. All studies in the APC note (if performed) were independently interpreted by me.   I have personally had a face to face encounter with the patient.     PCP: Timoteo Vega MD (Tony)  Patient Care Team:  Timoteo Vega MD (Tony) as PCP - General (Internal Medicine)     Yadi Syed is a 45 y.o. female who presents to the ED c/o right leg injury.  Patient reports that she fell while walking up the stairs, sustained a injury on her right anterior shin.  Patient reports that when she ices her leg and elevates the swelling will resolve but when she begins walking on it swelling will return.  Patient denies any head injury, no loss consciousness, no neck or back pain.  Patient reports that prior to injury she was not having any leg pain.  Patient denies any chest pain or shortness of breath.  Patient reports concern for possible DVT.  Unknown last tetanus.    On exam:  General: NAD.  Head: NCAT.  ENT: nares patent, no scleral icterus  Neck: Supple, trachea midline.  Cardiac: regular rate and rhythm.  Lungs: normal effort, clear to auscultation bilaterally  Abdomen: Soft, nondistended, NTTP, no rebound tenderness, no guarding or rigidity.   Extremities: Moves all extremities well, no peripheral edema.  Contusion with small abrasion on right anterior shin, no tenderness at the knee or ankle, neurovasc intact distally.  Mild swelling associated with contusion but no other swelling in the leg,  no palpable cords, negative Homans.  Neuro: alert, MAEW, follows commands  Psych: calm, cooperative  Skin: Warm, dry.    Medical Decision Making:  After the initial H&P, I discussed pertinent information from history and physical exam with patient/family.  Discussed differential diagnosis.  Discussed plan for ED evaluation/work-up/treatment.  All questions answered.  Patient/family is agreeable with plan.    ED Course as of 03/28/25 1127   Wed Mar 26, 2025   2132 Patient has contusion on the right anterior shin, obtaining x-ray imaging to rule out fracture.  Patient reports concern for DVT.  I had extensive discussion with patient and discussed extremely low suspicion for DVT given history and physical exam is not consistent with DVT.  Patient continues to have concern for DVT.  I discussed we do not currently have ultrasound imaging here at this time but I am happy to order outpatient ultrasound to rule out DVT that will occur in the morning.  Patient is agreeable with this. [JG]   2133 X-ray right tibia-fibula independently interpreted by me as no fracture [MP]      ED Course User Index  [JG] Conor Montemayor MD  [MP] Ashley Chung, PASHANNON       Diagnosis  Final diagnoses:   Injury of right lower extremity, initial encounter          Conor Montemayor MD  03/28/25 1127

## 2025-03-27 NOTE — DISCHARGE INSTRUCTIONS
Follow-up with primary care provider.  I have placed order for ultrasound of the leg tomorrow.  You will receive a phone call with appointment time.  Use Ace bandage to help compress the leg.  Continue icing to help with pain and swelling.  Return to emergency department for any worsening symptoms.

## 2025-04-16 ENCOUNTER — PREP FOR SURGERY (OUTPATIENT)
Dept: SURGERY | Facility: SURGERY CENTER | Age: 45
End: 2025-04-16
Payer: COMMERCIAL

## 2025-04-16 ENCOUNTER — OFFICE VISIT (OUTPATIENT)
Dept: GASTROENTEROLOGY | Facility: CLINIC | Age: 45
End: 2025-04-16
Payer: COMMERCIAL

## 2025-04-16 ENCOUNTER — LAB (OUTPATIENT)
Dept: LAB | Facility: HOSPITAL | Age: 45
End: 2025-04-16
Payer: COMMERCIAL

## 2025-04-16 VITALS
WEIGHT: 183 LBS | DIASTOLIC BLOOD PRESSURE: 68 MMHG | SYSTOLIC BLOOD PRESSURE: 108 MMHG | HEIGHT: 67 IN | BODY MASS INDEX: 28.72 KG/M2

## 2025-04-16 DIAGNOSIS — Z86.0100 HISTORY OF COLON POLYPS: ICD-10-CM

## 2025-04-16 DIAGNOSIS — Z80.0 FAMILY HISTORY OF COLON CANCER: ICD-10-CM

## 2025-04-16 DIAGNOSIS — A04.8 H. PYLORI INFECTION: ICD-10-CM

## 2025-04-16 DIAGNOSIS — Z12.11 COLON CANCER SCREENING: ICD-10-CM

## 2025-04-16 DIAGNOSIS — Z12.11 ENCOUNTER FOR SCREENING FOR MALIGNANT NEOPLASM OF COLON: ICD-10-CM

## 2025-04-16 DIAGNOSIS — Z79.1 NSAID LONG-TERM USE: ICD-10-CM

## 2025-04-16 DIAGNOSIS — R10.13 EPIGASTRIC PAIN: Primary | ICD-10-CM

## 2025-04-16 DIAGNOSIS — R10.13 EPIGASTRIC PAIN: ICD-10-CM

## 2025-04-16 DIAGNOSIS — Z12.11 ENCOUNTER FOR SCREENING COLONOSCOPY: ICD-10-CM

## 2025-04-16 DIAGNOSIS — A04.8 H. PYLORI INFECTION: Primary | ICD-10-CM

## 2025-04-16 DIAGNOSIS — R11.0 NAUSEA WITHOUT VOMITING: ICD-10-CM

## 2025-04-16 PROCEDURE — 83013 H PYLORI (C-13) BREATH: CPT

## 2025-04-16 RX ORDER — SODIUM CHLORIDE, SODIUM LACTATE, POTASSIUM CHLORIDE, CALCIUM CHLORIDE 600; 310; 30; 20 MG/100ML; MG/100ML; MG/100ML; MG/100ML
30 INJECTION, SOLUTION INTRAVENOUS CONTINUOUS PRN
OUTPATIENT
Start: 2025-04-16 | End: 2025-04-17

## 2025-04-16 RX ORDER — VONOPRAZAN FUMARATE 26.72 MG/1
20 TABLET ORAL DAILY
Qty: 15 TABLET | Refills: 0 | COMMUNITY
Start: 2025-04-16

## 2025-04-16 RX ORDER — SODIUM CHLORIDE 0.9 % (FLUSH) 0.9 %
3 SYRINGE (ML) INJECTION EVERY 12 HOURS SCHEDULED
OUTPATIENT
Start: 2025-04-16

## 2025-04-16 RX ORDER — SODIUM CHLORIDE 0.9 % (FLUSH) 0.9 %
10 SYRINGE (ML) INJECTION AS NEEDED
OUTPATIENT
Start: 2025-04-16

## 2025-04-16 NOTE — PATIENT INSTRUCTIONS
Schedule EGD and colonoscopy, orders placed     Breath testing today to assess for h. Pylori infection     Ways to help reduce heartburn symptoms:    start Voquezna 20 mg daily with or without food once daily   Avoid eating late night snacks within 3 hours of going to bed  If you have increased abdominal body weight, lifestyle changes to improve weight can help with heartburn symtoms  If you use tobacco products, we recommend that you stop smoking including e-cigarettes  Research has proven that people with heartburn who use tobacco can reduce heartburn symptoms by 44% after they stop smoking.   Sleep on your left side  Sleeping with your head/upper body elevated with a wedge pillow or with the head of the bed inclined   Avoid foods that can trigger symptoms which may include:  citrus fruits  spicy foods,  Tomatoes and Red sauces   Chocolate  coffee/tea  caffeinated or carbonated beverages  Alcohol  High fat foods  peppermint

## 2025-04-16 NOTE — PROGRESS NOTES
Patient or patient representative verbalized consent for the use of Ambient Listening during the visit with  ASHLEY Atkinson for chart documentation. 4/16/2025  16:06 EDT    Chief Complaint   Patient presents with    Abdominal Pain    Nausea    Colon Polyps         Patient is a 45 y.o. who presents to the office as a new patient patient for further evaluation of abdominal pain and nausea prior to undergoing colonoscopy evaluation.  Has a significant past medical history of colon polyps    6/12/2023  Colonoscopy with Dr. Ha:  Bowel prep described as fair  20 mm polyp in the ascending colon, removed using injection-lift and a hot snare. Resected and retrieved. Clips were placed. Tattooed.  Path: Hyperplastic polyp and focal sessile serrated lesion  Two 2 to 3 mm polyps in the ascending colon  Path: Tubular adenoma  4 mm polyp in the transverse colon  Path: Focal developing hyperplastic polyp with lymphoid aggregate  Two 4 to 6 mm polyps in the descending colon  8 mm polyp in the sigmoid colon  Path: Tubular adenoma  3 mm polyp at the recto-sigmoid colon  Tubular adenoma  Three diminutive polyps in the rectum  Path: Hyperplastic polyp  Nonbleeding internal hemorrhoids  Diverticulosis in sigmoid colon  Next Colonoscopy for screening recommended at  1 -year interval due  6/12/2024-this has not been completed at time of today's visit  Also recommended consideration of genetic testing given family history of colon cancer in brother and multiple tubular adenomatous polyps    Past Surgical History   Cholecystectomy    Social History  Former tobacco use  Denies alcohol or tobacco use  History of alcohol misuse    Family History  Colon cancer in brother diagnosed age 42    History of Present Illness  The patient presents for evaluation of nausea, abdominal pain, and constipation.    The patient reports frequent nausea, which they attribute to postnasal drip, occasionally accompanied by emesis. They experienced a  severe episode of nausea in 2014 that persisted for three months, during which they were unable to lift their head. The patient has been diagnosed with Helicobacter pylori infection on three occasions since [id number].  States that she completed prescribed treatment at that time however denies completion of eradication breath testing.    They are not currently taking medication for gastroesophageal reflux disease (GERD), but find some relief with ginger.  Denies unintentional weight loss.    The patient reports right upper quadrant abdominal pain. They have been following the Whole30 diet for the past month, which has improved their bowel movements from once a week to every other day. They regularly use bisacodyl (Dulcolax) and intermittently use senna (Senokot).  Denies nocturnal bowel movement.    The patient takes ibuprofen for hip and generalized body aches, which they attribute to their occupation as a , requiring them to be on their feet for 14 hours daily. They recently switched to naproxen (Aleve), taking 2 tablets instead of 1. They previously tried celecoxib (Celebrex) but discontinued it due to fatigue.    SOCIAL HISTORY  - Occupation:   - Diet: Whole30 diet for the last month, cutting out all processed foods    FAMILY HISTORY  - Brother: Colon cancer, passed away at age 42    Medications    Current Outpatient Medications:     gabapentin (NEURONTIN) 300 MG capsule, Take 2 capsules by mouth 2 (Two) Times a Day., Disp: 120 capsule, Rfl: 5    ibuprofen (ADVIL,MOTRIN) 800 MG tablet, Take 1 tablet by mouth Every 8 (Eight) Hours As Needed for Mild Pain., Disp: 30 tablet, Rfl: 0    MAGNESIUM PO, Take  by mouth., Disp: , Rfl:     tiZANidine (ZANAFLEX) 2 MG tablet, Take 1 tablet by mouth 2 (Two) Times a Day., Disp: 60 tablet, Rfl: 3    Ventolin  (90 Base) MCG/ACT inhaler, INHALE 2 PUFFS BY MOUTH EVERY 4 HOURS AS NEEDED FOR WHEEZING, Disp: 18 g, Rfl: 0    vitamin B-12 (CYANOCOBALAMIN)  "1000 MCG tablet, Take 1 tablet by mouth Daily., Disp: , Rfl:     naproxen (NAPROSYN) 375 MG tablet, Take 1 tablet by mouth 2 (Two) Times a Day With Meals. (Patient not taking: Reported on 3/7/2025), Disp: , Rfl:     PEG-KCl-NaCl-NaSulf-Na Asc-C (PLENVU) 140 g reconstituted solution solution, Take 140 g by mouth Take As Directed. Take as directed for bowel prep, Disp: 1 each, Rfl: 0    promethazine-dextromethorphan (PROMETHAZINE-DM) 6.25-15 MG/5ML syrup, Take 5 mL by mouth 4 (Four) Times a Day As Needed for Cough. (Patient not taking: Reported on 3/7/2025), Disp: 120 mL, Rfl: 1    Vonoprazan Fumarate (Voquezna) 20 MG tablet, Take 1 tablet by mouth Daily., Disp: 15 tablet, Rfl: 0  Result Review :      Common labs          5/28/2024    23:35 10/28/2024    15:35   Common Labs   Glucose 90     BUN 12     Creatinine 0.75     Sodium 140     Potassium 3.7     Chloride 106     Calcium 8.9     Albumin 3.9     Total Bilirubin <0.2     Alkaline Phosphatase 69     AST (SGOT) 14     ALT (SGPT) 13     WBC 8.36     Hemoglobin 14.5     Hematocrit 43.8     Platelets 300     Hemoglobin A1C  4.90      COLONOSCOPY (06/12/2023 13:13)   Tissue Pathology Exam (06/12/2023 13:37)   NM Gastric Emptying (10/08/2021 12:27)   Vital Signs:   /68 (BP Location: Left arm, Patient Position: Sitting, Cuff Size: Large Adult)   Ht 170.2 cm (67.01\")   Wt 83 kg (183 lb)   BMI 28.66 kg/m²     Body mass index is 28.66 kg/m².      Physical Exam  Assessment and Plan    Diagnoses and all orders for this visit:    1. H. pylori infection (Primary)  -     H. Pylori Breath Test - Breath, Lung    2. Epigastric pain  -     Vonoprazan Fumarate (Voquezna) 20 MG tablet; Take 1 tablet by mouth Daily.  Dispense: 15 tablet; Refill: 0    3. Encounter for screening colonoscopy  -     PEG-KCl-NaCl-NaSulf-Na Asc-C (PLENVU) 140 g reconstituted solution solution; Take 140 g by mouth Take As Directed. Take as directed for bowel prep  Dispense: 1 each; Refill: 0    4. " Encounter for screening for malignant neoplasm of colon    5. Family history of colon cancer    6. History of colon polyps    7. NSAID long-term use       Assessment & Plan  1. Nausea with epigastric abdominal pain:  - Conduct breath test for H. pylori to confirm eradication..  - Initiate retreatment if H. pylori positive.  - Given the severity of epigastric abdominal pain and frequency of nausea in the setting of long-term NSAID use we will plan to initiate Voquezna with samples provided at time of today's visit.  If helpful in reducing upper GI symptoms patient to notify our office for prescription.  - I have encouraged her to avoid NSAIDs when possible and reviewed impact on her upper GI tract and risk for irritation.    3. Constipation:  - Continue Whole30 diet.  -Recommend fiber supplementation and intermittent use of Senokot 1 to 2 capsules nightly every 48 hours after bowel movement if needed.  - She will contact our office in the interim if this is not successful in controlling bowel habits for further recommendations.    4. Colon polyps and family history of colon cancer in first-degree relative:  - She is agreeable to proceed with colonoscopy for cancer screening.  Case request placed.  Will also utilize Plenvu prescription bowel prep due to poor response to MiraLAX and Dulcolax prep at time of 2023 completion.  - if increased number of polyps present on colonoscopy evaluation we will plan to refer to genetics for further testing.    If workup negative and symptoms persist, to consider GES testing for possible slow gastric emptying contributing to daily nausea.      Follow up:  2-4 weeks after undergoing endoscopic evaluation with Further recommendations to be made pending results of the above work-up and clinical course.      Patient is agreeable to the outlined above treatment plan.  Verbalizes understanding and will contact office for any new or worsening concerns.  All questions answered and support  provided.  Patient Instructions   Schedule EGD and colonoscopy, orders placed     Breath testing today to assess for h. Pylori infection     Ways to help reduce heartburn symptoms:    start Voquezna 20 mg daily with or without food once daily   Avoid eating late night snacks within 3 hours of going to bed  If you have increased abdominal body weight, lifestyle changes to improve weight can help with heartburn symtoms  If you use tobacco products, we recommend that you stop smoking including e-cigarettes  Research has proven that people with heartburn who use tobacco can reduce heartburn symptoms by 44% after they stop smoking.   Sleep on your left side  Sleeping with your head/upper body elevated with a wedge pillow or with the head of the bed inclined   Avoid foods that can trigger symptoms which may include:  citrus fruits  spicy foods,  Tomatoes and Red sauces   Chocolate  coffee/tea  caffeinated or carbonated beverages  Alcohol  High fat foods  peppermint      EMR Dragon/Transcription Disclaimer:  This document has been Dictated utilizing Dragon dictation.     Mikaela Pizano, MSN, APRN, FNP-C   [facility]  Gastroenterology   [address] [facility]. [address] [place] [address]  [phone] office  [phone] fax

## 2025-04-17 LAB — UREA BREATH TEST QL: NEGATIVE

## 2025-05-05 ENCOUNTER — ANESTHESIA EVENT (OUTPATIENT)
Dept: SURGERY | Facility: SURGERY CENTER | Age: 45
End: 2025-05-05
Payer: COMMERCIAL

## 2025-05-05 ENCOUNTER — ANESTHESIA (OUTPATIENT)
Dept: SURGERY | Facility: SURGERY CENTER | Age: 45
End: 2025-05-05
Payer: COMMERCIAL

## 2025-05-05 ENCOUNTER — HOSPITAL ENCOUNTER (OUTPATIENT)
Facility: SURGERY CENTER | Age: 45
Setting detail: HOSPITAL OUTPATIENT SURGERY
Discharge: HOME | End: 2025-05-05
Attending: STUDENT IN AN ORGANIZED HEALTH CARE EDUCATION/TRAINING PROGRAM | Admitting: STUDENT IN AN ORGANIZED HEALTH CARE EDUCATION/TRAINING PROGRAM
Payer: COMMERCIAL

## 2025-05-05 VITALS
OXYGEN SATURATION: 99 % | DIASTOLIC BLOOD PRESSURE: 78 MMHG | TEMPERATURE: 97.4 F | HEIGHT: 67 IN | RESPIRATION RATE: 18 BRPM | SYSTOLIC BLOOD PRESSURE: 114 MMHG | HEART RATE: 72 BPM | BODY MASS INDEX: 27.91 KG/M2 | WEIGHT: 177.8 LBS

## 2025-05-05 DIAGNOSIS — R11.0 NAUSEA WITHOUT VOMITING: ICD-10-CM

## 2025-05-05 DIAGNOSIS — R10.13 EPIGASTRIC PAIN: ICD-10-CM

## 2025-05-05 DIAGNOSIS — Z86.0100 HISTORY OF COLON POLYPS: ICD-10-CM

## 2025-05-05 DIAGNOSIS — Z80.0 FAMILY HISTORY OF COLON CANCER: ICD-10-CM

## 2025-05-05 DIAGNOSIS — Z12.11 COLON CANCER SCREENING: ICD-10-CM

## 2025-05-05 DIAGNOSIS — A04.8 H. PYLORI INFECTION: ICD-10-CM

## 2025-05-05 PROCEDURE — 88305 TISSUE EXAM BY PATHOLOGIST: CPT | Performed by: STUDENT IN AN ORGANIZED HEALTH CARE EDUCATION/TRAINING PROGRAM

## 2025-05-05 PROCEDURE — 45385 COLONOSCOPY W/LESION REMOVAL: CPT | Performed by: STUDENT IN AN ORGANIZED HEALTH CARE EDUCATION/TRAINING PROGRAM

## 2025-05-05 PROCEDURE — 25810000003 LACTATED RINGERS PER 1000 ML

## 2025-05-05 PROCEDURE — 45380 COLONOSCOPY AND BIOPSY: CPT | Performed by: STUDENT IN AN ORGANIZED HEALTH CARE EDUCATION/TRAINING PROGRAM

## 2025-05-05 PROCEDURE — 43239 EGD BIOPSY SINGLE/MULTIPLE: CPT | Performed by: STUDENT IN AN ORGANIZED HEALTH CARE EDUCATION/TRAINING PROGRAM

## 2025-05-05 PROCEDURE — 25010000002 PROPOFOL 10 MG/ML EMULSION: Performed by: NURSE ANESTHETIST, CERTIFIED REGISTERED

## 2025-05-05 PROCEDURE — 25010000002 PROPOFOL 1000 MG/100ML EMULSION: Performed by: NURSE ANESTHETIST, CERTIFIED REGISTERED

## 2025-05-05 PROCEDURE — 25010000002 LIDOCAINE 2% SOLUTION: Performed by: NURSE ANESTHETIST, CERTIFIED REGISTERED

## 2025-05-05 DEVICE — REPLAY HEMOSTASIS CLIP, 16MM SPAN
Type: IMPLANTABLE DEVICE | Site: DESCENDING COLON | Status: FUNCTIONAL
Brand: REPLAY

## 2025-05-05 RX ORDER — SODIUM CHLORIDE 0.9 % (FLUSH) 0.9 %
10 SYRINGE (ML) INJECTION AS NEEDED
Status: DISCONTINUED | OUTPATIENT
Start: 2025-05-05 | End: 2025-05-05 | Stop reason: HOSPADM

## 2025-05-05 RX ORDER — SODIUM CHLORIDE 0.9 % (FLUSH) 0.9 %
3 SYRINGE (ML) INJECTION EVERY 12 HOURS SCHEDULED
Status: DISCONTINUED | OUTPATIENT
Start: 2025-05-05 | End: 2025-05-05 | Stop reason: HOSPADM

## 2025-05-05 RX ORDER — LIDOCAINE HYDROCHLORIDE 20 MG/ML
INJECTION, SOLUTION INFILTRATION; PERINEURAL AS NEEDED
Status: DISCONTINUED | OUTPATIENT
Start: 2025-05-05 | End: 2025-05-05 | Stop reason: SURG

## 2025-05-05 RX ORDER — SODIUM CHLORIDE, SODIUM LACTATE, POTASSIUM CHLORIDE, CALCIUM CHLORIDE 600; 310; 30; 20 MG/100ML; MG/100ML; MG/100ML; MG/100ML
30 INJECTION, SOLUTION INTRAVENOUS CONTINUOUS PRN
Status: DISCONTINUED | OUTPATIENT
Start: 2025-05-05 | End: 2025-05-05 | Stop reason: HOSPADM

## 2025-05-05 RX ORDER — PROPOFOL 10 MG/ML
INJECTION, EMULSION INTRAVENOUS AS NEEDED
Status: DISCONTINUED | OUTPATIENT
Start: 2025-05-05 | End: 2025-05-05 | Stop reason: SURG

## 2025-05-05 RX ADMIN — LIDOCAINE HYDROCHLORIDE 100 MG: 20 INJECTION, SOLUTION INFILTRATION; PERINEURAL at 08:22

## 2025-05-05 RX ADMIN — PROPOFOL 200 MCG/KG/MIN: 10 INJECTION, EMULSION INTRAVENOUS at 08:22

## 2025-05-05 RX ADMIN — PROPOFOL 80 MG: 10 INJECTION, EMULSION INTRAVENOUS at 08:22

## 2025-05-05 RX ADMIN — SODIUM CHLORIDE, SODIUM LACTATE, POTASSIUM CHLORIDE, AND CALCIUM CHLORIDE 30 ML/HR: 600; 310; 30; 20 INJECTION, SOLUTION INTRAVENOUS at 08:16

## 2025-05-05 NOTE — H&P
Patient Care Team:  Timoteo Vega MD (Tony) as PCP - General (Internal Medicine)    CHIEF COMPLAINT: Personal hx colon polyps and dyspepsia    HISTORY OF PRESENT ILLNESS:  EGD for dyspepsia   C/s for personal history of colon polyps    Past Medical History:   Diagnosis Date    Abnormal Pap smear of cervix 7/4/2000    Acute cholecystitis 10/22/2016    Acute sinusitis, unspecified     ADHD     NOT MEDICATED    Anemia 1999    Anxiety disorder, unspecified     Attention and concentration deficit     Bacterial infection due to H. pylori     Bipolar disorder 2000    Cervicalgia     Chills (without fever)     Chronic frontal sinusitis     Chronic low back pain     ER visits for low back pain/ back spasms;MRI L spine    Cystitis, unspecified without hematuria     Dehydration     HOSPITALIZED    Depression 1994    Diarrhea, unspecified     Dorsalgia, unspecified     Epigastric pain     Fibroid 7/4/2000    Frequency of micturition     GERD (gastroesophageal reflux disease)     Headache     Hematuria, unspecified     HPV (human papilloma virus) infection 2012    Kidney stone     Migraine, unspecified, not intractable, without status migrainosus     Nausea     Otalgia, bilateral     Other chronic pain     Other specified noninflammatory disorders of vagina     Ovarian cyst 1999    Papanicolaou smear     last 2014 was normal,  4/2013 was abn but no procedure, +h/o with cryo x 2, (20 yo, 27 yo)    Peripheral neuropathy     PMS (premenstrual syndrome) 1994    Polyneuropathy, unspecified     PONV (postoperative nausea and vomiting)     PUD (peptic ulcer disease)     Radiculopathy, cervical region     Spinal headache     Trochanteric bursitis of both hips 04/05/2022    Trochanteric bursitis, unspecified hip     Unspecified nonsuppurative otitis media, bilateral     Urinary tract infection 1987    Varicella     1998     Past Surgical History:   Procedure Laterality Date    BREAST BIOPSY      CERVICAL BIOPSY  W/ LOOP  ELECTRODE EXCISION  2000    CERVICAL CONIZATION N/A 12/20/2023    Procedure: CERVICAL CONIZATION;  Surgeon: Zenobia Garcia MD;  Location: [facility];  Service: Obstetrics/Gynecology;  Laterality: N/A;    CHOLECYSTECTOMY      COLONOSCOPY N/A 06/12/2023    Procedure: COLONOSCOPY to cecum with Polypectomy;  Surgeon: Francois Ha MD;  Location: [facility];  Service: Gastroenterology;  Laterality: N/A;  Adequate Prep, Ascending Polyp with a saline left, Hotsnare, Clip x2 Tattoo to ascending Colon ,Ascending polyp with biopsy x2 polyps,Transverse Polyp, Descending Polyp x2, Sigmoid Polyp x2, recto-sigmoid Polyp and Rectal polyp x2    D & C HYSTEROSCOPY N/A 12/20/2023    Procedure: HYSTEROSCOPY, DILATION AND CURETTAGE, endometrial resection WITH MYOSURE;  Surgeon: Zenobia Garcia MD;  Location: [facility];  Service: Obstetrics/Gynecology;  Laterality: N/A;    ENDOSCOPY N/A 08/30/2021    Procedure: ESOPHAGOGASTRODUODENOSCOPY;  Surgeon: Francois Ha MD;  Location: [facility];  Service: Gastroenterology;  Laterality: N/A;  irregular z-line, gastritis    LAPAROSCOPIC CHOLECYSTECTOMY  2014    MYOMECTOMY ABDOMINAL APPROACH      OTHER SURGICAL HISTORY      VUR REPAIR AT 12 YO    OVARIAN CYST SURGERY  2003    TUBAL ABDOMINAL LIGATION  2005    UTERINE FIBROID SURGERY      while pregnant at 22 yo    WISDOM TOOTH EXTRACTION  2005     Family History   Problem Relation Age of Onset    Ovarian cancer Mother     Colon polyps Mother     Osteoporosis Mother     Drug abuse Father         NOT IN HER LIFE    Colon cancer Brother     Stroke Maternal Aunt     Thyroid disease Maternal Grandmother     Stroke Paternal Grandmother     Crohn's disease Neg Hx     Irritable bowel syndrome Neg Hx     Ulcerative colitis Neg Hx     Malig Hyperthermia Neg Hx      Social History     Tobacco Use    Smoking status: Never     Passive exposure: Yes    Smokeless tobacco: Never    Tobacco comments:     moderate   Vaping Use    Vaping  "status: Every Day    Substances: Nicotine   Substance Use Topics    Alcohol use: Never    Drug use: Never     Medications Prior to Admission   Medication Sig Dispense Refill Last Dose/Taking    gabapentin (NEURONTIN) 300 MG capsule Take 2 capsules by mouth 2 (Two) Times a Day. 120 capsule 5 5/4/2025    ibuprofen (ADVIL,MOTRIN) 800 MG tablet Take 1 tablet by mouth Every 8 (Eight) Hours As Needed for Mild Pain. 30 tablet 0 Past Week    PEG-KCl-NaCl-NaSulf-Na Asc-C (PLENVU) 140 g reconstituted solution solution Take 140 g by mouth Take As Directed. Take as directed for bowel prep 1 each 0 5/4/2025    tiZANidine (ZANAFLEX) 2 MG tablet Take 1 tablet by mouth 2 (Two) Times a Day. 60 tablet 3 Past Week    Ventolin  (90 Base) MCG/ACT inhaler INHALE 2 PUFFS BY MOUTH EVERY 4 HOURS AS NEEDED FOR WHEEZING 18 g 0 Past Month    vitamin B-12 (CYANOCOBALAMIN) 1000 MCG tablet Take 1 tablet by mouth Daily.   Past Week    Vonoprazan Fumarate (Voquezna) 20 MG tablet Take 1 tablet by mouth Daily. 15 tablet 0 Past Week    MAGNESIUM PO Take  by mouth.   Unknown    naproxen (NAPROSYN) 375 MG tablet Take 1 tablet by mouth 2 (Two) Times a Day With Meals. (Patient not taking: Reported on 3/7/2025)   Unknown    promethazine-dextromethorphan (PROMETHAZINE-DM) 6.25-15 MG/5ML syrup Take 5 mL by mouth 4 (Four) Times a Day As Needed for Cough. (Patient not taking: Reported on 3/7/2025) 120 mL 1 More than a month     Allergies:  Patient has no known allergies.    REVIEW OF SYSTEMS:  Please see the above history of present illness for pertinent positives and negatives.  The remainder of the patient's systems have been reviewed and are negative.     Vital Signs  Temp:  [97.8 °F (36.6 °C)] 97.8 °F (36.6 °C)  Heart Rate:  [76] 76  Resp:  [16] 16  BP: (109)/(86) 109/86    Flowsheet Rows      Flowsheet Row First Filed Value   Admission Height 170.2 cm (67\") Documented at 05/02/2025 0955   Admission Weight 80.7 kg (178 lb) Documented at 05/02/2025 " 0955             Physical Exam:  Physical Exam   Constitutional: Patient appears well-developed and well-nourished and in no acute distress   HEENT:   Head: Normocephalic and atraumatic.   Eyes:  Pupils are equal, round, and reactive to light. EOM are intact. Sclerae are anicteric and non-injected.  Mouth and Throat: Patient has moist mucous membranes. Oropharynx is clear of any erythema or exudate.     Neck: Neck supple. No JVD present. No thyromegaly present. No lymphadenopathy present.  Cardiovascular: Regular rate, regular rhythm, S1 normal and S2 normal.  Exam reveals no gallop and no friction rub.  No murmur heard.  Pulmonary/Chest: Lungs are clear to auscultation bilaterally. No respiratory distress. No wheezes. No rhonchi. No rales.   Abdominal: Soft. Bowel sounds are normal. No distension and no mass. There is no hepatosplenomegaly. There is no tenderness.   Musculoskeletal: Normal Muscle tone  Extremities: No edema. Pulses are palpable in all 4 extremities.  Neurological: Patient is alert and oriented to person, place, and time. Cranial nerves II-XII are grossly intact with no focal deficits.  Skin: Skin is warm. No rash noted. Nails show no clubbing.  No cyanosis or erythema.    Debilities/Disabilities Identified: None  Emotional Behavior: Appropriate     Results Review:   I reviewed the patient's new clinical results.    Lab Results (most recent)       None            Imaging Results (Most Recent)       None          reviewed    ECG/EMG Results (most recent)       None          reviewed    Assessment & Plan   Personal hx colon polyps and dyspepsia/  EGD and colonoscopy      I discussed the patient's findings and my recommendations with patient.     Jan Raza MD  05/05/25  08:18 EDT    Time: 10 min prior to procedure.

## 2025-05-05 NOTE — DISCHARGE INSTRUCTIONS
ENDOSCOPY - EGD/COLONOSCOPY       ADULT CARE DISCHARGE  INSTRUCTIONS     Symptoms you may temporarily experience:      Sore Throat     Hoarseness     Bloating/Cramping     Dizziness     IV Irritation/tenderness     Gas or Belching     Slight fever     Small amount of blood in vomit or stool       Call Your Doctor for the following Problems: ______________________     ________________     Fever of 101 degrees or higher       Sharp abdominal  pain     Red streak up the arm from the IV site     Severe cramping        Large amount of blood in stool or vomit       Instructions for the next 24 hours after your Procedure:     Adult supervision     Do NOT drink any alcohol      Do not work today     NO important decisions     DO NOT sign any legal documents     You may shower/ bathe       DO NOT  DRIVE or operate machinery     Resume normal activity tomorrow       Discharge  Diet:     Avoid spicy/ greasy foods     Avoid any food that will cause more gas or bloating       *** Seek IMMEDIATE medical attention and call 911 if you develop symptoms such as:     Chest pain     Shortness of breath     Severe bleeding         POST CLIP INSTRUCTIONS    The post polypectomy clip is a new device that has been developed to stop or prevent bleeding during GI endoscopic procedures. The device consists of a small metal clip that is attached to the end of an endoscope. The clip is designed to be deployed at the site of bleeding or site where possible bleeding could occur.  It compresses the blood vessel and stops the bleeding.      1 clip was placed in your body on 5/5/2025 to control bleeding in your GI tract.  If scheduled for an MRI, please inform the technologist you should have an X-ray prior to your MRI to confirm the metal clip has passed.

## 2025-05-05 NOTE — ANESTHESIA PREPROCEDURE EVALUATION
Anesthesia Evaluation     history of anesthetic complications:  PONV  NPO Solid Status: > 8 hours  NPO Liquid Status: > 8 hours           Airway   Mallampati: I  No difficulty expected  Dental      Pulmonary    (+) asthma,  Cardiovascular   Exercise tolerance: good (4-7 METS)        Neuro/Psych  (+) headaches, numbness, psychiatric history  GI/Hepatic/Renal/Endo    (+) GERD, PUD, renal disease-    Musculoskeletal     (+) neck pain  Abdominal    Substance History      OB/GYN          Other      history of cancer                Anesthesia Plan    ASA 3     MAC     intravenous induction     Anesthetic plan, risks, benefits, and alternatives have been provided, discussed and informed consent has been obtained with: patient.    CODE STATUS:

## 2025-05-05 NOTE — ANESTHESIA POSTPROCEDURE EVALUATION
Patient: Yadi Syed    Procedure Summary       Date: 05/05/25 Room / Location: [facility]  / [facility]    Anesthesia Start: 0820 Anesthesia Stop: 0901    Procedures:       ESOPHAGOGASTRODUODENOSCOPY WITH BIOPSY      COLONOSCOPY TO CECUM FOR SCREENING WITH POLYPECTOMY Diagnosis:       H. pylori infection      Epigastric pain      Colon cancer screening      Nausea without vomiting      Family history of colon cancer      History of colon polyps      (H. pylori infection [A04.8])      (Epigastric pain [R10.13])      (Colon cancer screening [Z12.11])      (Nausea without vomiting [R11.0])      (Family history of colon cancer [Z80.0])      (History of colon polyps [Z86.0100])    Surgeons: Jan Raza MD Provider: Huy Benton MD    Anesthesia Type: MAC ASA Status: 3            Anesthesia Type: MAC    Vitals  Vitals Value Taken Time   /78 05/05/25 09:26   Temp 36.3 °C (97.4 °F) 05/05/25 09:00   Pulse 72 05/05/25 09:20   Resp 18 05/05/25 09:20   SpO2 99 % 05/05/25 09:20   Vitals shown include unfiled device data.        Post Anesthesia Care and Evaluation    Patient location during evaluation: PACU  Patient participation: complete - patient participated  Level of consciousness: awake  Pain management: adequate    Airway patency: patent  Anesthetic complications: No anesthetic complications  PONV Status: none  Cardiovascular status: acceptable  Respiratory status: acceptable  Hydration status: acceptable

## 2025-05-08 LAB
CYTO UR: NORMAL
LAB AP CASE REPORT: NORMAL
LAB AP CLINICAL INFORMATION: NORMAL
PATH REPORT.FINAL DX SPEC: NORMAL
PATH REPORT.GROSS SPEC: NORMAL

## 2025-08-04 ENCOUNTER — OFFICE VISIT (OUTPATIENT)
Dept: INTERNAL MEDICINE | Facility: CLINIC | Age: 45
End: 2025-08-04
Payer: COMMERCIAL

## 2025-08-04 VITALS
BODY MASS INDEX: 29.03 KG/M2 | WEIGHT: 185 LBS | SYSTOLIC BLOOD PRESSURE: 120 MMHG | HEART RATE: 79 BPM | DIASTOLIC BLOOD PRESSURE: 90 MMHG | OXYGEN SATURATION: 99 % | HEIGHT: 67 IN

## 2025-08-04 DIAGNOSIS — M70.61 TROCHANTERIC BURSITIS OF BOTH HIPS: Primary | ICD-10-CM

## 2025-08-04 DIAGNOSIS — M70.62 TROCHANTERIC BURSITIS OF BOTH HIPS: Primary | ICD-10-CM

## 2025-08-04 DIAGNOSIS — N95.1 PERIMENOPAUSE: ICD-10-CM

## 2025-08-04 DIAGNOSIS — I83.813 VARICOSE VEINS OF BOTH LOWER EXTREMITIES WITH PAIN: ICD-10-CM

## 2025-08-04 DIAGNOSIS — R00.2 PALPITATIONS: ICD-10-CM

## 2025-08-04 PROCEDURE — 20610 DRAIN/INJ JOINT/BURSA W/O US: CPT | Performed by: INTERNAL MEDICINE

## 2025-08-04 PROCEDURE — 99214 OFFICE O/P EST MOD 30 MIN: CPT | Performed by: INTERNAL MEDICINE

## 2025-08-04 PROCEDURE — 1125F AMNT PAIN NOTED PAIN PRSNT: CPT | Performed by: INTERNAL MEDICINE

## 2025-08-04 RX ORDER — TRIAMCINOLONE ACETONIDE 40 MG/ML
80 INJECTION, SUSPENSION INTRA-ARTICULAR; INTRAMUSCULAR
Status: COMPLETED | OUTPATIENT
Start: 2025-08-04 | End: 2025-08-04

## 2025-08-04 RX ADMIN — TRIAMCINOLONE ACETONIDE 80 MG: 40 INJECTION, SUSPENSION INTRA-ARTICULAR; INTRAMUSCULAR at 14:50

## 2025-08-04 RX ADMIN — TRIAMCINOLONE ACETONIDE 80 MG: 40 INJECTION, SUSPENSION INTRA-ARTICULAR; INTRAMUSCULAR at 14:49

## 2025-08-05 LAB
ALBUMIN SERPL-MCNC: 3.9 G/DL (ref 3.5–5.2)
ALBUMIN/GLOB SERPL: 1.5 G/DL
ALP SERPL-CCNC: 57 U/L (ref 39–117)
ALT SERPL-CCNC: 18 U/L (ref 1–33)
AST SERPL-CCNC: 19 U/L (ref 1–32)
BASOPHILS # BLD AUTO: 0.04 10*3/MM3 (ref 0–0.2)
BASOPHILS NFR BLD AUTO: 0.5 % (ref 0–1.5)
BILIRUB SERPL-MCNC: 0.4 MG/DL (ref 0–1.2)
BUN SERPL-MCNC: 10 MG/DL (ref 6–20)
BUN/CREAT SERPL: 14.5 (ref 7–25)
CALCIUM SERPL-MCNC: 9.3 MG/DL (ref 8.6–10.5)
CHLORIDE SERPL-SCNC: 104 MMOL/L (ref 98–107)
CO2 SERPL-SCNC: 27.1 MMOL/L (ref 22–29)
CREAT SERPL-MCNC: 0.69 MG/DL (ref 0.57–1)
CRP SERPL-MCNC: <0.3 MG/DL (ref 0–0.5)
EGFRCR SERPLBLD CKD-EPI 2021: 110.6 ML/MIN/1.73
EOSINOPHIL # BLD AUTO: 0.14 10*3/MM3 (ref 0–0.4)
EOSINOPHIL NFR BLD AUTO: 1.9 % (ref 0.3–6.2)
ERYTHROCYTE [DISTWIDTH] IN BLOOD BY AUTOMATED COUNT: 11.8 % (ref 12.3–15.4)
ESTRADIOL SERPL-MCNC: 23.2 PG/ML
FSH SERPL-ACNC: 7.6 MIU/ML
GLOBULIN SER CALC-MCNC: 2.6 GM/DL
GLUCOSE SERPL-MCNC: 88 MG/DL (ref 65–99)
HCT VFR BLD AUTO: 45.6 % (ref 34–46.6)
HGB BLD-MCNC: 14.9 G/DL (ref 12–15.9)
IMM GRANULOCYTES # BLD AUTO: 0.02 10*3/MM3 (ref 0–0.05)
IMM GRANULOCYTES NFR BLD AUTO: 0.3 % (ref 0–0.5)
LH SERPL-ACNC: 5.5 MIU/ML
LYMPHOCYTES # BLD AUTO: 2.44 10*3/MM3 (ref 0.7–3.1)
LYMPHOCYTES NFR BLD AUTO: 33 % (ref 19.6–45.3)
MCH RBC QN AUTO: 32.2 PG (ref 26.6–33)
MCHC RBC AUTO-ENTMCNC: 32.7 G/DL (ref 31.5–35.7)
MCV RBC AUTO: 98.5 FL (ref 79–97)
MONOCYTES # BLD AUTO: 0.67 10*3/MM3 (ref 0.1–0.9)
MONOCYTES NFR BLD AUTO: 9.1 % (ref 5–12)
NEUTROPHILS # BLD AUTO: 4.08 10*3/MM3 (ref 1.7–7)
NEUTROPHILS NFR BLD AUTO: 55.2 % (ref 42.7–76)
NRBC BLD AUTO-RTO: 0 /100 WBC (ref 0–0.2)
PLATELET # BLD AUTO: 317 10*3/MM3 (ref 140–450)
POTASSIUM SERPL-SCNC: 4.9 MMOL/L (ref 3.5–5.2)
PROT SERPL-MCNC: 6.5 G/DL (ref 6–8.5)
RBC # BLD AUTO: 4.63 10*6/MM3 (ref 3.77–5.28)
SODIUM SERPL-SCNC: 140 MMOL/L (ref 136–145)
TSH SERPL DL<=0.005 MIU/L-ACNC: 1.09 UIU/ML (ref 0.27–4.2)
VIT B12 SERPL-MCNC: 1071 PG/ML (ref 211–946)
WBC # BLD AUTO: 7.39 10*3/MM3 (ref 3.4–10.8)

## (undated) DEVICE — KT ORCA ORCAPOD DISP STRL

## (undated) DEVICE — SINGLE-USE BIOPSY FORCEPS: Brand: RADIAL JAW 4

## (undated) DEVICE — SYRINGE, LUER SLIP, STERILE, 60ML: Brand: MEDLINE

## (undated) DEVICE — 1812 FOAM BLOCK NEEDLE COUNTER: Brand: DEVON

## (undated) DEVICE — FLEX ADVANTAGE 1500CC: Brand: FLEX ADVANTAGE

## (undated) DEVICE — Device

## (undated) DEVICE — GAUZE,SPONGE,4"X4",16PLY,XRAY,STRL,LF: Brand: MEDLINE

## (undated) DEVICE — TRAP FLD MINIVAC MEGADYNE 100ML

## (undated) DEVICE — GOWN PROC ENDOARMOR GI LVL3 HY/SHLD UNIV

## (undated) DEVICE — INTENDED FOR TISSUE SEPARATION, AND OTHER PROCEDURES THAT REQUIRE A SHARP SURGICAL BLADE TO PUNCTURE OR CUT.: Brand: BARD-PARKER ® CARBON RIB-BACK BLADES

## (undated) DEVICE — MEDI-VAC YANKAUER SUCTION HANDLE W/BULBOUS TIP: Brand: CARDINAL HEALTH

## (undated) DEVICE — MSK ENDO PORT O2 POM ELITE CURAPLEX A/

## (undated) DEVICE — SUT VIC 2/0 CT2 27IN J269H

## (undated) DEVICE — GLV SURG SENSICARE PI PF LF 7 GRN STRL

## (undated) DEVICE — SUREFIT, DUAL DISPERSIVE ELECTRODE, CONTACT QUALITY MONITOR: Brand: SUREFIT

## (undated) DEVICE — DRSNG TELFA PAD NONADH STR 1S 3X8IN

## (undated) DEVICE — SEAL HYSTERSCOPE/OUTFLOW CHANNEL MYOSURE

## (undated) DEVICE — NEEDLE, QUINCKE, 20GX3.5": Brand: MEDLINE

## (undated) DEVICE — SOL IRR NACL 0.9PCT 3000ML

## (undated) DEVICE — BLCK/BITE BLOX W/DENTL/RIM W/STRAP 54F

## (undated) DEVICE — SNAR POLYP CAPTIVATOR/COLD STFF RND 10MM 240CM

## (undated) DEVICE — CP SLF SEAL HYSTERSCOPE 2MM/HL

## (undated) DEVICE — HDRST POSTN SLOTTED A/

## (undated) DEVICE — GOWN ISOL W/THUMB UNIV BLU BX/15

## (undated) DEVICE — GLV SURG SENSICARE PI MIC PF SZ6 LF STRL

## (undated) DEVICE — LASSO POLYPECTOMY SNARE: Brand: LASSO

## (undated) DEVICE — ELECTRD BALL EZ CLN STD 5IN

## (undated) DEVICE — GOWN,NON-REINFORCED,SIRUS,SET IN SLV,XL: Brand: MEDLINE

## (undated) DEVICE — GLV SURG BIOGEL LTX PF 6

## (undated) DEVICE — ANTIBACTERIAL UNDYED BRAIDED (POLYGLACTIN 910), SYNTHETIC ABSORBABLE SUTURE: Brand: COATED VICRYL

## (undated) DEVICE — LEGGINGS, PAIR, CLEAR, STERILE: Brand: MEDLINE

## (undated) DEVICE — TBG PENCL TELESCP MEGADYNE SMOKE EVAC 10FT

## (undated) DEVICE — GOWN SURG ENDOARMOR LVL3 UNIV KNT/CUF DISP NS

## (undated) DEVICE — SUT SILK 2/0 FS BLK 18IN 685G

## (undated) DEVICE — CUSTOM PROCTO SWABS: Brand: DEROYAL

## (undated) DEVICE — ELECTRD BLD EZ CLN STD 6.5IN

## (undated) DEVICE — VIAL FORMLN CAP 10PCT 20ML

## (undated) DEVICE — DEV TISS REMOV MYOSURE/LITE HYSTEROSCP

## (undated) DEVICE — THE SINGLE USE ETRAP – POLYP TRAP IS USED FOR SUCTION RETRIEVAL OF ENDOSCOPICALLY REMOVED POLYPS.: Brand: ETRAP

## (undated) DEVICE — LAB CORP RAPD UREA

## (undated) DEVICE — GLV SURG SENSICARE PI MIC PF SZ7 LF STRL

## (undated) DEVICE — KT PROC HYSTSCPY TB ST

## (undated) DEVICE — ADAPT CLN SCPE ENDO PORPOISE BX/50 DISP

## (undated) DEVICE — TOWEL,OR,DSP,ST,BLUE,STD,4/PK,20PK/CS: Brand: MEDLINE

## (undated) DEVICE — STRAP STIRUP WO/ RNG

## (undated) DEVICE — GLV SURG SENSICARE POLYISPRN W/ALOE PF LF 6.5 GRN STRL

## (undated) DEVICE — 4-PORT MANIFOLD: Brand: NEPTUNE 2

## (undated) DEVICE — COVER,MAYO STAND,STERILE: Brand: MEDLINE

## (undated) DEVICE — APPL COTN TP PLSTC 6IN STRL LF PK/2

## (undated) DEVICE — SKIN PREP TRAY W/CHG: Brand: MEDLINE INDUSTRIES, INC.

## (undated) DEVICE — GLV SURG SENSICARE PI MIC PF SZ6.5 LF STRL

## (undated) DEVICE — CANN NASL CO2 TRULINK W/O2 A/

## (undated) DEVICE — GOWN,SIRUS,NON REINFRCD,LARGE,SET IN SL: Brand: MEDLINE

## (undated) DEVICE — BITEBLOCK OMNI BLOC

## (undated) DEVICE — THE STERILE LIGHT HANDLE COVER IS USED WITH STERIS SURGICAL LIGHTING AND VISUALIZATION SYSTEMS.

## (undated) DEVICE — DRAPE,UNDERBUTTOCKS,PCH,STERILE: Brand: MEDLINE

## (undated) DEVICE — SYR SLP TP 10ML DISP

## (undated) DEVICE — LOU D & C HYSTEROSCOPY: Brand: MEDLINE INDUSTRIES, INC.

## (undated) DEVICE — CONTAINER,SPECIMEN,OR STERILE,4OZ: Brand: MEDLINE

## (undated) DEVICE — PATIENT RETURN ELECTRODE, SINGLE-USE, CONTACT QUALITY MONITORING, ADULT, WITH 15 FT (4.5 M) CORD. FOR PATIENTS WEIGHING OVER 33LBS. (15KG): Brand: MEGADYNE

## (undated) DEVICE — TP SXN YANKR BULB STRL

## (undated) DEVICE — VAGINAL PACKING: Brand: DEROYAL